# Patient Record
Sex: MALE | Race: WHITE | URBAN - METROPOLITAN AREA
[De-identification: names, ages, dates, MRNs, and addresses within clinical notes are randomized per-mention and may not be internally consistent; named-entity substitution may affect disease eponyms.]

---

## 2023-02-08 ENCOUNTER — APPOINTMENT (OUTPATIENT)
Dept: GENERAL RADIOLOGY | Age: 78
DRG: 246 | End: 2023-02-08
Attending: INTERNAL MEDICINE
Payer: MEDICARE

## 2023-02-08 ENCOUNTER — HOSPITAL ENCOUNTER (INPATIENT)
Age: 78
LOS: 13 days | Discharge: HOME | DRG: 246 | End: 2023-02-21
Attending: INTERNAL MEDICINE | Admitting: INTERNAL MEDICINE
Payer: MEDICARE

## 2023-02-08 DIAGNOSIS — N17.9 ACUTE KIDNEY INJURY SUPERIMPOSED ON CKD (HCC): Primary | ICD-10-CM

## 2023-02-08 DIAGNOSIS — N18.9 ACUTE KIDNEY INJURY SUPERIMPOSED ON CKD (HCC): Primary | ICD-10-CM

## 2023-02-08 PROBLEM — I50.21 ACUTE SYSTOLIC (CONGESTIVE) HEART FAILURE (HCC): Status: ACTIVE | Noted: 2023-02-08

## 2023-02-08 PROBLEM — R60.0 PERIPHERAL EDEMA: Status: ACTIVE | Noted: 2023-02-08

## 2023-02-08 PROBLEM — I50.23 ACUTE ON CHRONIC SYSTOLIC (CONGESTIVE) HEART FAILURE (HCC): Status: ACTIVE | Noted: 2023-02-08

## 2023-02-08 PROBLEM — E11.22 TYPE 2 DIABETES MELLITUS WITH STAGE 3 CHRONIC KIDNEY DISEASE, WITHOUT LONG-TERM CURRENT USE OF INSULIN (HCC): Status: ACTIVE | Noted: 2023-02-08

## 2023-02-08 PROBLEM — N18.30 STAGE 3 CHRONIC KIDNEY DISEASE (HCC): Status: ACTIVE | Noted: 2023-02-08

## 2023-02-08 PROBLEM — N18.30 TYPE 2 DIABETES MELLITUS WITH STAGE 3 CHRONIC KIDNEY DISEASE, WITHOUT LONG-TERM CURRENT USE OF INSULIN (HCC): Status: ACTIVE | Noted: 2023-02-08

## 2023-02-08 PROBLEM — I25.10 CORONARY ARTERY DISEASE INVOLVING NATIVE HEART WITHOUT ANGINA PECTORIS: Status: ACTIVE | Noted: 2023-02-08

## 2023-02-08 PROBLEM — R60.9 PERIPHERAL EDEMA: Status: ACTIVE | Noted: 2023-02-08

## 2023-02-08 LAB
ANION GAP SERPL CALCULATED.3IONS-SCNC: 9 MMOL/L (ref 3–16)
BUN BLDV-MCNC: 53 MG/DL (ref 7–20)
CALCIUM SERPL-MCNC: 9.1 MG/DL (ref 8.3–10.6)
CHLORIDE BLD-SCNC: 105 MMOL/L (ref 99–110)
CHOLESTEROL, TOTAL: 102 MG/DL (ref 0–199)
CO2: 25 MMOL/L (ref 21–32)
CREAT SERPL-MCNC: 1.6 MG/DL (ref 0.8–1.3)
EKG ATRIAL RATE: 78 BPM
EKG ATRIAL RATE: 81 BPM
EKG DIAGNOSIS: NORMAL
EKG DIAGNOSIS: NORMAL
EKG P AXIS: -21 DEGREES
EKG P AXIS: 0 DEGREES
EKG P-R INTERVAL: 148 MS
EKG P-R INTERVAL: 80 MS
EKG Q-T INTERVAL: 416 MS
EKG Q-T INTERVAL: 432 MS
EKG QRS DURATION: 138 MS
EKG QRS DURATION: 142 MS
EKG QTC CALCULATION (BAZETT): 483 MS
EKG QTC CALCULATION (BAZETT): 492 MS
EKG R AXIS: -48 DEGREES
EKG R AXIS: -48 DEGREES
EKG T AXIS: 100 DEGREES
EKG T AXIS: 118 DEGREES
EKG VENTRICULAR RATE: 78 BPM
EKG VENTRICULAR RATE: 81 BPM
GFR SERPL CREATININE-BSD FRML MDRD: 44 ML/MIN/{1.73_M2}
GLUCOSE BLD-MCNC: 166 MG/DL (ref 70–99)
GLUCOSE BLD-MCNC: 170 MG/DL (ref 70–99)
GLUCOSE BLD-MCNC: 176 MG/DL (ref 70–99)
GLUCOSE BLD-MCNC: 214 MG/DL (ref 70–99)
GLUCOSE BLD-MCNC: 243 MG/DL (ref 70–99)
HDLC SERPL-MCNC: 43 MG/DL (ref 40–60)
INR BLD: 1.16 (ref 0.87–1.14)
LDL CHOLESTEROL CALCULATED: 45 MG/DL
MAGNESIUM: 2.1 MG/DL (ref 1.8–2.4)
PERFORMED ON: ABNORMAL
POTASSIUM SERPL-SCNC: 4 MMOL/L (ref 3.5–5.1)
PRO-BNP: 7200 PG/ML (ref 0–449)
PROTHROMBIN TIME: 14.8 SEC (ref 11.7–14.5)
SODIUM BLD-SCNC: 139 MMOL/L (ref 136–145)
TRIGL SERPL-MCNC: 69 MG/DL (ref 0–150)
TROPONIN: 0.46 NG/ML
TSH SERPL DL<=0.05 MIU/L-ACNC: 5.9 UIU/ML (ref 0.27–4.2)
VLDLC SERPL CALC-MCNC: 14 MG/DL

## 2023-02-08 PROCEDURE — 93010 ELECTROCARDIOGRAM REPORT: CPT | Performed by: INTERNAL MEDICINE

## 2023-02-08 PROCEDURE — 2060000000 HC ICU INTERMEDIATE R&B

## 2023-02-08 PROCEDURE — 83880 ASSAY OF NATRIURETIC PEPTIDE: CPT

## 2023-02-08 PROCEDURE — 93005 ELECTROCARDIOGRAM TRACING: CPT | Performed by: INTERNAL MEDICINE

## 2023-02-08 PROCEDURE — 36415 COLL VENOUS BLD VENIPUNCTURE: CPT

## 2023-02-08 PROCEDURE — 99223 1ST HOSP IP/OBS HIGH 75: CPT | Performed by: INTERNAL MEDICINE

## 2023-02-08 PROCEDURE — 83735 ASSAY OF MAGNESIUM: CPT

## 2023-02-08 PROCEDURE — 80061 LIPID PANEL: CPT

## 2023-02-08 PROCEDURE — 97116 GAIT TRAINING THERAPY: CPT

## 2023-02-08 PROCEDURE — 6370000000 HC RX 637 (ALT 250 FOR IP): Performed by: INTERNAL MEDICINE

## 2023-02-08 PROCEDURE — 93005 ELECTROCARDIOGRAM TRACING: CPT | Performed by: HOSPITALIST

## 2023-02-08 PROCEDURE — 84484 ASSAY OF TROPONIN QUANT: CPT

## 2023-02-08 PROCEDURE — 2580000003 HC RX 258: Performed by: INTERNAL MEDICINE

## 2023-02-08 PROCEDURE — 84443 ASSAY THYROID STIM HORMONE: CPT

## 2023-02-08 PROCEDURE — 85610 PROTHROMBIN TIME: CPT

## 2023-02-08 PROCEDURE — 80048 BASIC METABOLIC PNL TOTAL CA: CPT

## 2023-02-08 PROCEDURE — 6360000002 HC RX W HCPCS: Performed by: INTERNAL MEDICINE

## 2023-02-08 PROCEDURE — 97530 THERAPEUTIC ACTIVITIES: CPT

## 2023-02-08 PROCEDURE — 97165 OT EVAL LOW COMPLEX 30 MIN: CPT

## 2023-02-08 PROCEDURE — 97161 PT EVAL LOW COMPLEX 20 MIN: CPT

## 2023-02-08 PROCEDURE — 71045 X-RAY EXAM CHEST 1 VIEW: CPT

## 2023-02-08 RX ORDER — INSULIN LISPRO 100 [IU]/ML
0-4 INJECTION, SOLUTION INTRAVENOUS; SUBCUTANEOUS NIGHTLY
Status: DISCONTINUED | OUTPATIENT
Start: 2023-02-08 | End: 2023-02-19 | Stop reason: SDUPTHER

## 2023-02-08 RX ORDER — ACETAMINOPHEN 325 MG/1
650 TABLET ORAL EVERY 6 HOURS PRN
Status: DISCONTINUED | OUTPATIENT
Start: 2023-02-08 | End: 2023-02-17 | Stop reason: ALTCHOICE

## 2023-02-08 RX ORDER — SODIUM CHLORIDE 0.9 % (FLUSH) 0.9 %
10 SYRINGE (ML) INJECTION EVERY 12 HOURS SCHEDULED
Status: DISCONTINUED | OUTPATIENT
Start: 2023-02-08 | End: 2023-02-21 | Stop reason: HOSPADM

## 2023-02-08 RX ORDER — SENNA PLUS 8.6 MG/1
1 TABLET ORAL DAILY PRN
Status: DISCONTINUED | OUTPATIENT
Start: 2023-02-08 | End: 2023-02-21 | Stop reason: HOSPADM

## 2023-02-08 RX ORDER — INSULIN GLARGINE 100 [IU]/ML
30 INJECTION, SOLUTION SUBCUTANEOUS 2 TIMES DAILY
Status: DISCONTINUED | OUTPATIENT
Start: 2023-02-08 | End: 2023-02-13

## 2023-02-08 RX ORDER — ONDANSETRON 2 MG/ML
4 INJECTION INTRAMUSCULAR; INTRAVENOUS EVERY 6 HOURS PRN
Status: DISCONTINUED | OUTPATIENT
Start: 2023-02-08 | End: 2023-02-21 | Stop reason: HOSPADM

## 2023-02-08 RX ORDER — MAGNESIUM SULFATE IN WATER 40 MG/ML
2000 INJECTION, SOLUTION INTRAVENOUS PRN
Status: DISCONTINUED | OUTPATIENT
Start: 2023-02-08 | End: 2023-02-21 | Stop reason: HOSPADM

## 2023-02-08 RX ORDER — INSULIN LISPRO 100 [IU]/ML
0-4 INJECTION, SOLUTION INTRAVENOUS; SUBCUTANEOUS
Status: DISCONTINUED | OUTPATIENT
Start: 2023-02-08 | End: 2023-02-19 | Stop reason: SDUPTHER

## 2023-02-08 RX ORDER — PIOGLITAZONEHYDROCHLORIDE 45 MG/1
45 TABLET ORAL DAILY
Status: ON HOLD | COMMUNITY
End: 2023-02-21 | Stop reason: HOSPADM

## 2023-02-08 RX ORDER — NATEGLINIDE 120 MG/1
120 TABLET ORAL
Status: ON HOLD | COMMUNITY
End: 2023-02-21 | Stop reason: HOSPADM

## 2023-02-08 RX ORDER — TAMSULOSIN HYDROCHLORIDE 0.4 MG/1
0.8 CAPSULE ORAL NIGHTLY
Status: DISCONTINUED | OUTPATIENT
Start: 2023-02-08 | End: 2023-02-21 | Stop reason: HOSPADM

## 2023-02-08 RX ORDER — ASPIRIN 325 MG
325 TABLET ORAL DAILY
Status: ON HOLD | COMMUNITY
End: 2023-02-21 | Stop reason: HOSPADM

## 2023-02-08 RX ORDER — FUROSEMIDE 40 MG/1
40 TABLET ORAL DAILY
COMMUNITY

## 2023-02-08 RX ORDER — AMLODIPINE BESYLATE 5 MG/1
5 TABLET ORAL DAILY
Status: ON HOLD | COMMUNITY
End: 2023-02-21 | Stop reason: HOSPADM

## 2023-02-08 RX ORDER — LOSARTAN POTASSIUM AND HYDROCHLOROTHIAZIDE 12.5; 5 MG/1; MG/1
1 TABLET ORAL DAILY
Status: ON HOLD | COMMUNITY
End: 2023-02-21 | Stop reason: HOSPADM

## 2023-02-08 RX ORDER — SODIUM CHLORIDE 9 MG/ML
INJECTION, SOLUTION INTRAVENOUS PRN
Status: DISCONTINUED | OUTPATIENT
Start: 2023-02-08 | End: 2023-02-21 | Stop reason: HOSPADM

## 2023-02-08 RX ORDER — ENOXAPARIN SODIUM 100 MG/ML
30 INJECTION SUBCUTANEOUS 2 TIMES DAILY
Status: DISCONTINUED | OUTPATIENT
Start: 2023-02-08 | End: 2023-02-21 | Stop reason: HOSPADM

## 2023-02-08 RX ORDER — ONDANSETRON 4 MG/1
4 TABLET, ORALLY DISINTEGRATING ORAL EVERY 8 HOURS PRN
Status: DISCONTINUED | OUTPATIENT
Start: 2023-02-08 | End: 2023-02-21 | Stop reason: HOSPADM

## 2023-02-08 RX ORDER — ASPIRIN 325 MG
325 TABLET ORAL DAILY
Status: DISCONTINUED | OUTPATIENT
Start: 2023-02-08 | End: 2023-02-17

## 2023-02-08 RX ORDER — POTASSIUM CHLORIDE 7.45 MG/ML
10 INJECTION INTRAVENOUS PRN
Status: DISCONTINUED | OUTPATIENT
Start: 2023-02-08 | End: 2023-02-21 | Stop reason: HOSPADM

## 2023-02-08 RX ORDER — SODIUM CHLORIDE 0.9 % (FLUSH) 0.9 %
10 SYRINGE (ML) INJECTION PRN
Status: DISCONTINUED | OUTPATIENT
Start: 2023-02-08 | End: 2023-02-21 | Stop reason: HOSPADM

## 2023-02-08 RX ORDER — POTASSIUM CHLORIDE 20 MEQ/1
40 TABLET, EXTENDED RELEASE ORAL PRN
Status: DISCONTINUED | OUTPATIENT
Start: 2023-02-08 | End: 2023-02-21 | Stop reason: HOSPADM

## 2023-02-08 RX ORDER — DEXTROSE MONOHYDRATE 100 MG/ML
INJECTION, SOLUTION INTRAVENOUS CONTINUOUS PRN
Status: DISCONTINUED | OUTPATIENT
Start: 2023-02-08 | End: 2023-02-21 | Stop reason: HOSPADM

## 2023-02-08 RX ORDER — TAMSULOSIN HYDROCHLORIDE 0.4 MG/1
0.4 CAPSULE ORAL DAILY
Status: ON HOLD | COMMUNITY
End: 2023-02-21 | Stop reason: HOSPADM

## 2023-02-08 RX ORDER — CARVEDILOL 3.12 MG/1
3.12 TABLET ORAL 2 TIMES DAILY WITH MEALS
Status: DISCONTINUED | OUTPATIENT
Start: 2023-02-09 | End: 2023-02-21 | Stop reason: HOSPADM

## 2023-02-08 RX ORDER — AMLODIPINE BESYLATE 5 MG/1
5 TABLET ORAL DAILY
Status: DISCONTINUED | OUTPATIENT
Start: 2023-02-08 | End: 2023-02-10

## 2023-02-08 RX ORDER — ACETAMINOPHEN 650 MG/1
650 SUPPOSITORY RECTAL EVERY 6 HOURS PRN
Status: DISCONTINUED | OUTPATIENT
Start: 2023-02-08 | End: 2023-02-21 | Stop reason: HOSPADM

## 2023-02-08 RX ORDER — ATORVASTATIN CALCIUM 40 MG/1
40 TABLET, FILM COATED ORAL DAILY
Status: DISCONTINUED | OUTPATIENT
Start: 2023-02-08 | End: 2023-02-21 | Stop reason: HOSPADM

## 2023-02-08 RX ORDER — ATORVASTATIN CALCIUM 40 MG/1
40 TABLET, FILM COATED ORAL DAILY
COMMUNITY

## 2023-02-08 RX ORDER — FUROSEMIDE 10 MG/ML
40 INJECTION INTRAMUSCULAR; INTRAVENOUS 2 TIMES DAILY
Status: DISCONTINUED | OUTPATIENT
Start: 2023-02-08 | End: 2023-02-12

## 2023-02-08 RX ADMIN — TAMSULOSIN HYDROCHLORIDE 0.8 MG: 0.4 CAPSULE ORAL at 21:19

## 2023-02-08 RX ADMIN — ASPIRIN 325 MG: 325 TABLET ORAL at 09:33

## 2023-02-08 RX ADMIN — ACETAMINOPHEN 650 MG: 325 TABLET ORAL at 16:26

## 2023-02-08 RX ADMIN — ENOXAPARIN SODIUM 30 MG: 100 INJECTION SUBCUTANEOUS at 09:34

## 2023-02-08 RX ADMIN — INSULIN LISPRO 1 UNITS: 100 INJECTION, SOLUTION INTRAVENOUS; SUBCUTANEOUS at 17:34

## 2023-02-08 RX ADMIN — AMLODIPINE BESYLATE 5 MG: 5 TABLET ORAL at 09:33

## 2023-02-08 RX ADMIN — INSULIN GLARGINE 30 UNITS: 100 INJECTION, SOLUTION SUBCUTANEOUS at 21:16

## 2023-02-08 RX ADMIN — ENOXAPARIN SODIUM 30 MG: 100 INJECTION SUBCUTANEOUS at 21:19

## 2023-02-08 RX ADMIN — FUROSEMIDE 40 MG: 10 INJECTION, SOLUTION INTRAMUSCULAR; INTRAVENOUS at 17:34

## 2023-02-08 RX ADMIN — ATORVASTATIN CALCIUM 40 MG: 40 TABLET, FILM COATED ORAL at 09:33

## 2023-02-08 RX ADMIN — FUROSEMIDE 40 MG: 10 INJECTION, SOLUTION INTRAMUSCULAR; INTRAVENOUS at 09:33

## 2023-02-08 RX ADMIN — Medication 10 ML: at 09:34

## 2023-02-08 RX ADMIN — INSULIN GLARGINE 30 UNITS: 100 INJECTION, SOLUTION SUBCUTANEOUS at 09:34

## 2023-02-08 ASSESSMENT — PAIN SCALES - GENERAL
PAINLEVEL_OUTOF10: 3
PAINLEVEL_OUTOF10: 3
PAINLEVEL_OUTOF10: 0

## 2023-02-08 ASSESSMENT — PAIN DESCRIPTION - LOCATION
LOCATION: FOOT
LOCATION: FOOT

## 2023-02-08 ASSESSMENT — PAIN DESCRIPTION - ORIENTATION
ORIENTATION: RIGHT
ORIENTATION: RIGHT

## 2023-02-08 ASSESSMENT — PAIN DESCRIPTION - DESCRIPTORS
DESCRIPTORS: ACHING
DESCRIPTORS: ACHING

## 2023-02-08 NOTE — DISCHARGE INSTRUCTIONS
Labs in 1 week.       For nutrition questions after discharge please call the Registered Dietitian at [phone].      Heart Failure Nutrition Therapy  This diet will help you feel better and support your heart by reducing symptoms of fluid retention, shortness of breath and swelling.   You should focus on:  Limiting sodium in your diet by reading labels and limiting foods high in sodium.  Limit your daily sodium intake to 2,000 mg per day.  Select foods with 140 mg of sodium or less per serving.  Foods with more than 300 mg of sodium per serving may not fit into a reduced-sodium meal plan.  Check serving sizes. If you eat more than 1 serving, you will get more sodium than the amount listed.   Limiting fluid in your diet.  Ask your doctor how much fluid you can have per day  Remember foods that are liquid at room temperature such as popsicles, soup, ice cream and Jell-O are fluids.   Checking your weight to make sure you're not retaining too much fluid.  Weigh yourself every morning. If you gain 3 or more pounds in one day or 5 pounds within 1 week, call your doctor.  Foods to choose and avoid:  Avoid processed foods. Eat more fresh foods.  Fresh and frozen fruits and vegetables are good choices.  Choose fresh meats. Avoid processed meats such as gabriel, sausage and hot dogs.  Do not add salt to your food while cooking or at the table.  Try dry or fresh herbs, pepper, lemon juice, or a sodium-free seasoning blend such as Mrs. Dash to add flavor to food.   Do not use a salt substitute.  Use caution at restaurants  Restaurant foods are high in sodium. Ask for your food to be cooked without salt and request sauces and dressing to come “on the side.”                The [place] Sleepiness Scale       The [place] Sleepiness Scale is widely used in the field of sleep medicine as a subjective measure of a patient's sleepiness. The test is a list of eight situations in which you rate your tendency to become sleepy on a  scale of 0, no chance to 3, high chance of dozing. Your score is based on a scale of 0 to 24. The scale estimates whether you are experiencing excessive sleepiness that possibly requires medical attention. How Sleepy Are You? How sleepy are you to doze off or fall asleep in the following situations? You should rate your chances of dozing off, not just feeling tired. Even if you have not done some of these things recently try to determine how they would have affected you. For each situation, decide whether or not you would have:     0 = No chance of dozing 1 = Slight chance of dozing   2 = Moderate chance of  dozing 3 = High change of dozing       Situation                                                                                     Chance of Dozing    Sitting and reading  0 =  []  1 =    [] 2 =    [] 3 =    [x]    Watching TV  0 =  []  1 =    [] 2 =    [x] 3 =    []      Sitting inactive in public place (e.g., a theater or a meeting)  0 =  [x]  1 =    [] 2 =    [] 3 =    []    As a passenger in a car for an hour without a break          0  =  [x]  1 =    [] 2 =    [] 3 =    []    Lying down to rest in the afternoon when circumstances permit    0 =  []  1 =    [] 2 =    [x] 3 =    []    Sitting and talking to someone  0 =  [x]  1 =    [] 2 =    [] 3 =    []      Sitting quietly after a lunch without alcohol  0 =  []  1 =    [] 2 =    [] 3 =    [x]    In a car, while stopped for a few minutes in traffic                                                                      0 =  [x]  1 =    [] 2 =    [] 3 =    []    Total Score = 10    If your total score is 10 or greater, you are experiencing excessive sleepiness and should consider seeking a medical follow-up. Take a copy of this screening test to your primary care physician on your next office visit. Interpretation:      0 -   7: It is unlikely that you are abnormally sleepy. 8 -   9: You have an average amount of daytime sleepiness. 10 - 15:  You may be excessively sleepy depending on the situation. You may want to consider seeking medical attention.   16 - 24: You are excessively sleepy and should consider seeking medical attention.      Electronically signed by Mark Bennett RCP on 2/9/2023 at 6:31 PM    Post Angiogram/ Intervention Discharge Instructions      Do you have the help you need at home?        Activity:  You may drive in 24hrs unless instructed by your doctor   Resume normal activity in one week  Avoid lifting, pushing, or pulling more than 10 lbs. For one week. (A gallon of milk is 7 lbs)  Limit stair climbing to once a day for two weeks.  You may walk at an easy pace on ground level.  Plan rest periods during the day.  Avoid tension and stress.  Learn to manage your stress.  Avoid work that increases muscle tension.        (straining with bowel movements, moving furniture)    Wound Care:  You may shower, but no bathtubs, pools, or hot tubs for one week.  Inspect area daily.  Normal observations:  Soreness and tenderness that may last for one week, mild pink to red oozing from incision site for up to 24 hrs after discharge,    bruising that could last up to two weeks.  Clean with soap and water.  NO lotion or powder.  Dry area thoroughly.  Apply band    aide for 48 hrs.    Nutrition:   Low fat, low salt diet (guidelines for Heart Healthy eating)  Limit caffeine to 1-2 cups per day (coffee, tea, chocolate, soda)  Eat high fiber to avoid constipation and straining during bowel movements (fresh veggies and fruit, whole grain)  Limit alcohol to two servings a day ( 8 oz beer, 1 oz liquor, 4 oz wine )  Drink at least 8 to 10 glasses of decaffeinated, non-alcoholic fluid for the next 24 hours to flush the x-ray dye used for your angiogram out of your body.     Depression:  It is not unusual to have feelings of anxiety, fear, or depression after your procedure.  If you need help with these feelings, call your primary care physician.  There are  medications to help you and healing usually occurs sooner if you are not depressed. Cardiac Rehab Information Given : [phone]  Your physician has referred you to Cardiac Rehab. This is an important part of your recovery. You have been given a brief explanation of Cardiac Rehab and instructions when to call Cardiac Rehab. The Cardiac Rehab team works with your cardiologist to start your Rehab at the appropriate time in your recovery. Remember to discuss Cardiac Rehab with your physician at your first follow-up visit. What symptoms or health problems do you need to look out for after you leave the hospital? Call your physician if you have any of these symptoms.      Increased shortness of breath, weakness, or increased fatigue  A fast or a slow heartbeat  Problems or questions with your medications  Bleeding that is not stopped after holding pressure for 10 min  Feeling lightheaded or dizzy  Increased swelling or bruising of the groin or leg  Unusual pain, numbness or tingling at the groin or down the leg  Any signs of infection ( redness, yellow drainage, swelling, pain, fever)  Unusual swelling of lower legs/feet, chest discomfort, unusual weakness or fatigue

## 2023-02-08 NOTE — PROGRESS NOTES
[facility]   Phone: [phone]    Occupational Therapy    [x] Initial Evaluation            [] Daily Treatment Note         [x] Discharge Summary      Patient: Monica Ortiz   : 1945   MRN: [id number]   Date of Service:  2023    Admitting Diagnosis:  Acute on chronic systolic (congestive) heart failure [facility])  Current Admission Summary: 69 y.o. male who presented with confusion leg swelling shortness of breath and hypoxia patient is a transfer from outside ER. History of CABG CAD currently not seeing any cardiologist has not seen a doctor in a while who presents to ER with worsening shortness of breath and leg swelling orthopnea no fevers no chills nothing that makes it better or worse. History of diabetes hypertension   Past Medical History:  has no past medical history on file. Past Surgical History:  has no past surgical history on file. Discharge Recommendations: Monica Ortiz scored a 23/24 on the AM-PAC ADL Inpatient form. At this time, no further OT is recommended upon discharge due to pt at baseline. Recommend patient returns to prior setting with prior services. DME Required For Discharge: patient has all required DME for discharge    Precautions/Restrictions: medium fall risk  Weight Bearing Restrictions: no restrictions  [] Right Upper Extremity  [] Left Upper Extremity [] Right Lower Extremity  [] Left Lower Extremity     Required Braces/Orthotics: no braces required   [] Right  [] Left  Positional Restrictions:no positional restrictions    Pre-Admission Information   Lives With: spouse    Type of Home: mobile home  Home Layout:  1 step down to stove room?   Home Access: ramped entry, 2 step to enter without rails   Bathroom Layout: tub/shower unit  Bathroom Equipment: shower chair, hand held shower head  Toilet Height: standard height  Home Equipment: rolling walker, single point cane, quad cane  Transfer Assistance: Independent without use of device  Ambulation Assistance:Independent without use of device, ambulates short distances d/t \"bad left hip\"  ADL Assistance: independent with all ADL's  IADL Assistance:  wife completes IADLs  Active :        [x] Yes  [] No  Hand Dominance: [] Left  [x] Right  Current Employment: retired. Occupation: driving tractor trailer  Hobbies: watching [employer]: denies recent falls     Examination   Vision:   Vision Gross Assessment: WFL  Hearing:   hard of hearing  Posture:   good  Sensation:   reports numbness and tingling in (B) LE- neuropathy  Tone:   Normotonic  Coordination Testing:   WFL    ROM:   (B) UE AROM WFL  Strength:   (B) UE strength grossly WFL    Therapist Clinical Decision Making (Complexity): low complexity  Clinical Presentation: stable      Subjective  General: patient supine in bed on arrival, agreeable to OT/PT evaluation. Pt reports getting up to bathroom on own. Pain: 4/10. Location: from mccoy catheter  Pain Interventions: pain medication in place prior to arrival        Activities of Daily Living  Basic Activities of Daily Living  Lower Extremity Dressing: minimal assistance Comment: A to thread LLE d/t mccoy catheter; pt reports he has a whole set up at home to don pants/underwear  Instrumental Activities of Daily Living  No IADL completed on this date. Functional Mobility  Bed Mobility  Supine to Sit: modified independent  Sit to Supine: modified independent  Scooting: modified independent  Comments: Hob elevated  Transfers  Sit to stand transfer:Independent  Stand to sit transfer: Independent  Comments: EOB to no device  Functional Mobility:  Standing Balance: Independent. Functional Mobility: .   Independent  Functional Mobility Activity: 50  Functional Mobility Device Use: no device  Comment: limping on LLE d/t pain, reports this is baseline, pt only able to ambulate short distances d/t L hip pain    Other Therapeutic Interventions    Functional Outcomes  AM-PAC Inpatient Daily Activity Raw Score: 23    Cognition  WFL  Orientation:    alert and oriented x 4  Command Following:   [facility]     Education  Barriers To Learning: none  Patient Education: patient educated on OT role and benefits, plan of care, ADL adaptive strategies, discharge recommendations  Learning Assessment:  patient verbalizes and demonstrates understanding    Assessment  Activity Tolerance: tolerates well  Impairments Requiring Therapeutic Intervention: none - eval with same day discharge  Prognosis: good  Clinical Assessment: patient presents at baseline, no further OT needs at this time. Safety Interventions: patient left in bed, call light within reach, and nurse notified    Plan  Frequency: Eval with same day discharge. No follow up required. Current Treatment Recommendations: not applicable, evaluation completed with same day discharge. Goals  Patient Goals:    Short Term Goals:  Time Frame:   Patient eval with same day discharge. No goals set as patient is at baseline status.     Therapy Session Time     Individual Group Co-treatment   Time In    1350   Time Out    1413   Minutes    23        Timed Code Treatment Minutes:   8  Total Treatment Minutes:  23       Electronically Signed By: MYRA Rosado/CORTEZ [id number]

## 2023-02-08 NOTE — PLAN OF CARE
Pt updated on current plan of care, see flowsheet for assessment.    Problem: Discharge Planning  Goal: Discharge to home or other facility with appropriate resources  2/8/2023 1438 by Darien Cobb RN  Outcome: Progressing     Problem: Safety - Adult  Goal: Free from fall injury  2/8/2023 1438 by Darien Cobb RN  Outcome: Progressing     Problem: ABCDS Injury Assessment  Goal: Absence of physical injury  2/8/2023 1438 by Darien Cobb RN  Outcome: Progressing     Problem: Respiratory - Adult  Goal: Achieves optimal ventilation and oxygenation  Outcome: Progressing     Problem: Cardiovascular - Adult  Goal: Maintains optimal cardiac output and hemodynamic stability  Outcome: Progressing     Problem: Skin/Tissue Integrity - Adult  Goal: Skin integrity remains intact  Outcome: Progressing     Problem: Gastrointestinal - Adult  Goal: Maintains or returns to baseline bowel function  Outcome: Progressing     Problem: Genitourinary - Adult  Goal: Absence of urinary retention  Outcome: Progressing     Problem: Metabolic/Fluid and Electrolytes - Adult  Goal: Electrolytes maintained within normal limits  Outcome: Progressing

## 2023-02-08 NOTE — PROGRESS NOTES
Pt has no orders to continue the Heparin he arrived. Dr. Anna Peña about both patient's arrival, being on Heparin GTT and a request was made for doctor to see STAT EKG that was ordered.

## 2023-02-08 NOTE — H&P
HOSPITALISTS HISTORY AND PHYSICAL    2/8/2023 10:47 AM    Patient Information:  Guinevere Libman is a 69 y.o. male [id number]  PCP:  No primary care provider on file. (Tel: None )    Chief complaint:  No chief complaint on file. Shortness of    History of Present Illness:  Robb Wilson is a 69 y.o. male who presented with confusion leg swelling shortness of breath and hypoxia patient is a transfer from outside ER. History of CABG CAD currently not seeing any cardiologist has not seen a doctor in a while who presents to ER with worsening shortness of breath and leg swelling orthopnea no fevers no chills nothing that makes it better or worse. History of diabetes hypertension   REVIEW OF SYSTEMS:   Constitutional: Negative for fever,chills or night sweats  ENT: Negative for rhinorrhea, epistaxis, hoarseness, sore throat. Respiratory: Negative for shortness of breath,wheezing  Cardiovascular: Negative for chest pain, palpitations   Gastrointestinal: Negative for nausea, vomiting, diarrhea  Genitourinary: Negative for polyuria, dysuria   Hematologic/Lymphatic: Negative for bleeding tendency, easy bruising  Musculoskeletal: Negative for myalgias and arthralgias  Neurologic: Negative for confusion,dysarthria. Skin: Negative for itching,rash, good capillary refill. Psychiatric: Negative for depression,anxiety, agitation. Endocrine: Negative for polydipsia,polyuria,heat /cold intolerance. Past Medical History:   has no past medical history on file. Past Surgical History:   has no past surgical history on file. Medications:  No current facility-administered medications on file prior to encounter.      Current Outpatient Medications on File Prior to Encounter   Medication Sig Dispense Refill    atorvastatin (LIPITOR) 40 MG tablet Take 40 mg by mouth daily Take at bedtime      Insulin Detemir (LEVEMIR FLEXTOUCH SC) Inject 30 Units into the skin 2 times daily tamsulosin (FLOMAX) 0.4 MG capsule Take 0.4 mg by mouth daily Take at bedtime      nateglinide (STARLIX) 120 MG tablet Take 120 mg by mouth 3 times daily (before meals)      losartan-hydroCHLOROthiazide (HYZAAR) 50-12.5 MG per tablet Take 1 tablet by mouth daily      pioglitazone (ACTOS) 45 MG tablet Take 45 mg by mouth daily Each morning      amLODIPine (NORVASC) 5 MG tablet Take 5 mg by mouth daily Each evening      furosemide (LASIX) 40 MG tablet Take 40 mg by mouth daily Each morning      aspirin 325 MG tablet Take 325 mg by mouth daily Each evening         Allergies: Allergies   Allergen Reactions    Latex         Social History:        Family History:  family history is not on file. ,     Physical Exam:  /68   Pulse 73   Temp 98 °F (36.7 °C) (Oral)   Resp 19   Ht 6' 1\" (1.854 m)   Wt (!) 308 lb (139.7 kg)   SpO2 99%   BMI 40.64 kg/m²     General appearance:  Appears comfortable. Well nourished  Eyes: Sclera clear, pupils equal  ENT: Moist mucus membranes, no thrush. Trachea midline. Cardiovascular: Regular rhythm, normal S1, S2. No murmur, gallop, rub. No edema in lower extremities  Respiratory: Clear to auscultation bilaterally, no wheeze, good inspiratory effort  Gastrointestinal: Abdomen soft, non-tender, not distended, normal bowel sounds  Musculoskeletal: No cyanosis in digits, neck supple  Neurology: Cranial nerves grossly intact. Alert and oriented in time, place and person. No speech or motor deficits  Psychiatry: Appropriate affect.  Not agitated  Skin: Warm, dry, normal turgor, no rash    Labs:  CBC: No results found for: WBC, RBC, HGB, HCT, MCV, MCH, MCHC, RDW, PLT, MPV  BMP:    Lab Results   Component Value Date/Time     02/08/2023 06:03 AM    K 4.0 02/08/2023 06:03 AM     02/08/2023 06:03 AM    CO2 25 02/08/2023 06:03 AM    BUN 53 02/08/2023 06:03 AM    CREATININE 1.6 02/08/2023 06:03 AM    CALCIUM 9.1 02/08/2023 06:03 AM    LABGLOM 44 02/08/2023 06:03 AM    GLUCOSE 170 02/08/2023 06:03 AM       Chest Xray:   EKG:    I visualized CXR images and EKG strips     Discussed  with      Problem List  Principal Problem:    Acute on chronic systolic (congestive) heart failure (HCC)  Resolved Problems:    * No resolved hospital problems. *        Assessment/Plan:   Acute CHF exacerbation  -Patient with history of CABG CAD. No recent echo diagnosing cardiology  -We will start with echocardiogram IV Lasix monitor close  -Cardiology consult.   -Monitor renal function closely    Chronic kidney disease  -Was 1.6 no baseline to compare so we will monitor  renal function      Diabetes  -A1c goal for medication sliding scale.  -Lantus  BPH continue Flomax    CAD on aspirin statin we will continue      Leslee Sampsno MD    2/8/2023 10:47 AM

## 2023-02-08 NOTE — CONSULTS
[facility]  Advanced CHF/Pulmonary Hypertension   Cardiac Evaluation      Florence Needles  YOB: 1945    Requesting PHysician:  Dr. Monique Martines    Chief complaint:  shortness of breath  History of Present Illness:  Radha Grimes is a 70 yo male who presented to [facility] ER with confusion, leg swelling, shortness of breath, and hypoxia. He was transferred here for the same. He has a history of CAD/CABG. His CABG was about 20 years ago at [facility]. He followed years ago with Dr. Pelon Russell from [facility]. When Jillian left, he just kept following with his PCP. No cardiac testing in the last 10+ years. He says that he has had worsening shortness of breath and leg swelling plus orthopnea for over a month. He went to his pcp office a few days before admission for severe peripheral edema. He was started on lasix, but did not have significant improvement. He denies chest pain. He has chronic kidney disease stage 3 and sees a nephrologist.  He has long standing diabetes. He has been on Actos for a long time. No recent med changes other than starting lasix. He denies fever, chills. Nothing makes the shortness of breath better or worse. We are consulted for management of his HF. Labs:  Sodium 139  K 4.0  BUN/Cre 53/1.6  Glucose 170  Bnp 7200  Troponin 0.46    CXR:  Impression   Mild cardiomegaly with findings of pulmonary edema.      I/O's negative 1600 cc  Lipitor 40  Insulin  Flomax  Losartan/hctz 50/12.5 qd  Actos 45 qd  Amlodipine 5 qd  Lasix 40 qd  Aspirin 325 qd        Allergies   Allergen Reactions    Latex      Current Facility-Administered Medications   Medication Dose Route Frequency Provider Last Rate Last Admin    sodium chloride flush 0.9 % injection 10 mL  10 mL IntraVENous 2 times per day Kevin Deng MD   10 mL at 02/08/23 0934    sodium chloride flush 0.9 % injection 10 mL  10 mL IntraVENous PRN Kevin Deng MD        0.9 % sodium chloride infusion   IntraVENous PRN Marcella Melendez Abida Bhandari MD        ondansetron (ZOFRAN-ODT) disintegrating tablet 4 mg  4 mg Oral Q8H PRN Sally Rios MD        Or    [facility]ans[facility]ron [facility] injection 4 mg  4 mg IntraVENous Q6H PRN Sally Rios MD        [facility]) tablet 8.6 mg  1 tablet Oral Daily PRN Sally Rios MD        acetaminophen (TYLENOL) tablet 650 mg  650 mg Oral Q6H PRN Sally Rios MD        Or    acetaminophen (TYLENOL) suppository 650 mg  650 mg Rectal Q6H PRN Sally Rios MD        enoxaparin Sodium (LOVENOX) injection 30 mg  30 mg SubCUTAneous BID Sally Rios MD   30 mg at 02/08/23 0934    potassium chloride (KLOR-CON M) extended release tablet 40 mEq  40 mEq Oral PRN Sally Rios MD        Or    potassium bicarb-citric acid (EFFER-K) effervescent tablet 40 mEq  40 mEq Oral PRN Sally Rios MD        Or    potassium chloride 10 mEq/100 mL IVPB (Peripheral Line)  10 mEq IntraVENous PRN Sally Rios MD        magnesium sulfate 2000 mg in 50 mL IVPB premix  2,000 mg IntraVENous PRN Sally Rios MD        furosemide (LASIX) injection 40 mg  40 mg IntraVENous BID Sally Rios MD   40 mg at 02/08/23 0933    amLODIPine (NORVASC) tablet 5 mg  5 mg Oral Daily Sally Rios MD   5 mg at 02/08/23 8151    aspirin tablet 325 mg  325 mg Oral Daily Sally Rios MD   325 mg at 02/08/23 0933    atorvastatin (LIPITOR) tablet 40 mg  40 mg Oral Daily Sally Rios MD   40 mg at 02/08/23 0933    tamsulosin (FLOMAX) capsule 0.8 mg  0.8 mg Oral Nightly aSlly Rios MD        insulin lispro (HUMALOG) injection vial 0-4 Units  0-4 Units SubCUTAneous TID [facility] Sally Rios MD        insulin lispro (HUMALOG) injection vial 0-4 Units  0-4 Units SubCUTAneous Nightly Sally Rios MD        dextrose bolus 10% 125 mL  125 mL IntraVENous PRN Sally iRos MD        Or    dextrose bolus 10% 250 mL  250 mL IntraVENous PRN Sally Rios MD        glucagon (rDNA) injection 1 mg  1 mg SubCUTAneous PRN Sally Rios MD        dextrose 10 % infusion IntraVENous Continuous PRN Jose Rico MD        insulin glargine (LANTUS) injection vial 30 Units  30 Units SubCUTAneous BID Jose Rico MD   30 Units at 02/08/23 0000    perflutren lipid microspheres (DEFINITY) injection 1.5 mL  1.5 mL IntraVENous ONCE PRN Lisa Guerrero MD           No past medical history on file. No past surgical history on file. No family history on file. Social History     Socioeconomic History    Marital status:      Spouse name: Not on file    Number of children: Not on file    Years of education: Not on file    Highest education level: Not on file   Occupational History    Not on file   Tobacco Use    Smoking status: Not on file    Smokeless tobacco: Not on file   Substance and Sexual Activity    Alcohol use: Not on file    Drug use: Not on file    Sexual activity: Not on file   Other Topics Concern    Not on file   Social History Narrative    Not on file     Social Determinants of Health     Financial Resource Strain: Not on file   Food Insecurity: Not on file   Transportation Needs: Not on file   Physical Activity: Not on file   Stress: Not on file   Social Connections: Not on file   Intimate Partner Violence: Not on file   Housing Stability: Not on file       Review of Systems:   Constitutional: there has been no unanticipated weight loss. There's been no change in energy level, sleep pattern, or activity level. Eyes: No visual changes or diplopia. No scleral icterus. ENT: No Headaches, hearing loss or vertigo. No mouth sores or sore throat. Cardiovascular: Reviewed in HPI  Respiratory: No cough or wheezing, no sputum production. No hematemesis. Gastrointestinal: No abdominal pain, appetite loss, blood in stools. No change in bowel or bladder habits. Genitourinary: No dysuria, trouble voiding, or hematuria. Musculoskeletal:  No gait disturbance, weakness or joint complaints. Integumentary: No rash or pruritis.   Neurological: No headache, diplopia, change in muscle strength, numbness or tingling. No change in gait, balance, coordination, mood, affect, memory, mentation, behavior. Psychiatric: No anxiety, no depression. Endocrine: No malaise, fatigue or temperature intolerance. No excessive thirst, fluid intake, or urination. No tremor. Hematologic/Lymphatic: No abnormal bruising or bleeding, blood clots or swollen lymph nodes. Allergic/Immunologic: No nasal congestion or hives. Physical Examination:    Vitals:    02/08/23 0722 02/08/23 0741 02/08/23 0900 02/08/23 1041   BP: 118/73  (!) 132/58 124/68   Pulse: 91   73   Resp: 18   19   Temp: 98.5 °F (36.9 °C)   98 °F (36.7 °C)   TempSrc: Oral   Oral   SpO2: 97%   99%   Weight:  (!) 308 lb (139.7 kg)     Height:         Body mass index is 40.64 kg/m². Wt Readings from Last 3 Encounters:   02/08/23 (!) 308 lb (139.7 kg)     BP Readings from Last 3 Encounters:   02/08/23 124/68     Constitutional and General Appearance:   Chronically ill appearing, with swelling. HEENT:  NC/AT  EDISON  No problems with hearing  Skin:  Warm, dry  Respiratory:  Normal excursion and expansion without use of accessory muscles  Resp Auscultation: Normal breath sounds without dullness  Cardiovascular: The apical impulses not displaced  Heart tones are crisp and normal  Cervical veins are not engorged  The carotid upstroke is normal in amplitude and contour without delay or bruit  JVP less than 8 cm H2O  RRR with nl S1 and S2 without m,r,g  Peripheral pulses are symmetrical and full  There is no clubbing, cyanosis of the extremities.   3+ bilateral edema up to knees with skin changes consistent with chronic edema  Femoral Arteries: 2+ and equal  Pedal Pulses: 2+ and equal   Neck:  No thyromegaly  Abdomen:  No masses or tenderness  Liver/Spleen: No Abnormalities Noted  Neurological/Psychiatric:  Alert and oriented in all spheres  Moves all extremities well  Exhibits normal gait balance and coordination  No abnormalities of mood, affect, memory, mentation, or behavior are noted    Labs were reviewed including labs from other hospital systems through [facility]. Cardiac testing was reviewed including echos, nuclear scans, cardiac catheterization, including from other hospital systems through [facility]. Assessment:     Acute heart failure, likely systolic  CAD, s/p CABG about 20 years ago  Chronic kidney disease  Diabetes, multiple drugs  Hyperlipidemia  Peripheral edema    Plan:   Get an echo to evaluate LVEF  He likely has worsening CAD leading to systolic HF  Continue IV Lasix for several more days  Stop Actos since it can make peripheral edema much worse  Consider adding Jardiance for diabetes and HF  Hold ACEI/ARB/ARNI for now until better diuresed  Will not start spironolactone due to kidney failure. He likely can only tolerate ACEI/ARB/ARNI with his kidney failure  Will need ischemic evaluation if LVEF has dropped  Start coreg 3.125 bid  If LVEF is low, I would stop amlodipine and give him ACE  I/ARB/ARNI instead  CHF education reinforced. ~salt restriction  ~fluid restriction  ~medication compliance  ~daily weights and notify of any significant weight gain/loss  ~establish with CHF nurse  ~outpatient follow-up with our CHF team    The patient was seen for > 75 minutes. I reviewed interval history, physical exam, review of data including labs, imaging, development and implementation of treatment plan and coordination of complex care. More than 50% of the time was devoted to counseling the patient on their diagnoses/treatments, as well as coordination of care with the other care teams      I appreciate the opportunity of cooperating in the care of this patient.     Seth Buckley M.D., [facility]

## 2023-02-08 NOTE — PROGRESS NOTES
[facility] Department   Phone: [phone]    Physical Therapy    [x] Initial Evaluation            [] Daily Treatment Note         [x] Discharge Summary      Patient: Robb Wilson   : 1945   MRN: [id number]   Date of Service:  2023  Admitting Diagnosis: Acute on chronic systolic (congestive) heart failure [facility])  Current Admission Summary:  Robb Wilson is a 69 y.o. male who presented with confusion leg swelling shortness of breath and hypoxia patient is a transfer from outside ER. History of CABG CAD currently not seeing any cardiologist has not seen a doctor in a while who presents to ER with worsening shortness of breath and leg swelling orthopnea no fevers no chills nothing that makes it better or worse. History of diabetes hypertension   Past Medical History:  has no past medical history on file. Past Surgical History:  has no past surgical history on file. Discharge Recommendations: Robb Wilson scored a 24/24 on the AM-PAC short mobility form. At this time, no further PT is recommended upon discharge due to presentation at baseline function. Recommend patient returns to prior setting with prior services. DME Required For Discharge: None  Precautions/Restrictions: medium fall risk  Weight Bearing Restrictions: no restrictions     Required Braces/Orthotics: no braces required  Positional Restrictions:no positional restrictions    Lives With: spouse                  Type of Home: mobile home  Home Layout:  1 step down to stove room?   Home Access: ramped entry, 2 step to enter without rails   Bathroom Layout: tub/shower unit  Bathroom Equipment: shower chair, hand held shower head  Toilet Height: standard height  Home Equipment: rolling walker, single point cane, quad cane  Transfer Assistance: Independent without use of device  Ambulation Assistance:Independent without use of device, ambulates short distances only d/t \"bad left hip\"  ADL Assistance: independent with all ADL's  IADL Assistance:  wife completes IADLs  Active :        [x] Yes                 [] No  Hand Dominance: [] Left                 [x] Right  Current Employment: retired. Occupation: driving tractor trailer  Hobbies: watching [email or web address] Kraig: denies recent falls     Examination   Vision:   Vision Gross Assessment: WFL  Hearing:   WFL  Observation:   Redness in BLEs   Sensation:   N/T in B feet - peripheral neuropathy  ROM:   BLE WFL  Strength:   BLE WFL  Decision Making: low complexity  Clinical Presentation: stable      Subjective  General: supine in bed upon arrival. Agreeable to therapy. Pain: 4/10. Location: discomfort from mccoy  Pain Interventions: RN notified       Functional Mobility  Bed Mobility  Supine to sit: Independent  Sit to supine: Independent  Comments:  Transfers  Sit to stand: Independent  Stand to sit:  Independent  Comments:  Ambulation  Surface:level surface  Assistive Device: no device  Assistance: Independent  Distance: 50'  Gait Mechanics: increased medial lateral sway (reports leg length discrepancy following hip surgery), steady   Comments:    Stair Mobility  Not performed this date    Balance  Static Sitting Balance: good: independent with functional balance in unsupported position  Dynamic Sitting Balance: good: independent with functional balance in unsupported position  Static Standing Balance: good: independent with functional balance in unsupported position  Dynamic Standing Balance: good: independent with functional balance in unsupported position  Comments:    Other Therapeutic Interventions    Functional Outcomes  AM-PAC Inpatient Mobility Raw Score : 24              Cognition  WFL  Orientation:    alert and oriented x 4  Command Following:   [facility]    Education  Barriers To Learning: none  Patient Education: patient educated on goals, PT role and benefits, discharge recommendations  Learning Assessment:  patient verbalizes and demonstrates understanding    Assessment  Activity Tolerance: limited by chronic pain  Impairments Requiring Therapeutic Intervention: none- eval with same day discharge  Clinical Assessment: Patient presents at baseline function. No further acute PT needs. Safety Interventions: patient left in bed, call light within reach, patient at risk for falls, and nurse notified    Plan  Frequency: Eval with same day discharge - No follow up required. Current Treatment Recommendations: not applicable, evaluation completed with same day discharge. Goals  Short Term Goals:    Patient eval with same day discharge. No goals set as patient is at baseline mobility status.       Therapy Session Time      Individual Group Co-treatment   Time In     3100   Time Out     1412   Minutes     23     Timed Code Treatment Minutes:  8 Minutes  Total Treatment Minutes:  23       Electronically Signed By: Moon Medellin PT      Thanks, Moon Medellin PT, DPT [id number]

## 2023-02-09 PROBLEM — I50.9 ACUTE HEART FAILURE (HCC): Status: ACTIVE | Noted: 2023-02-08

## 2023-02-09 PROBLEM — E78.5 HYPERLIPIDEMIA: Status: ACTIVE | Noted: 2023-02-09

## 2023-02-09 PROBLEM — I25.84 CORONARY ARTERY DISEASE DUE TO CALCIFIED CORONARY LESION: Status: ACTIVE | Noted: 2023-02-08

## 2023-02-09 LAB
ALBUMIN SERPL-MCNC: 3.5 G/DL (ref 3.4–5)
ALP BLD-CCNC: 96 U/L (ref 40–129)
ALT SERPL-CCNC: 16 U/L (ref 10–40)
ANION GAP SERPL CALCULATED.3IONS-SCNC: 11 MMOL/L (ref 3–16)
AST SERPL-CCNC: 18 U/L (ref 15–37)
BILIRUB SERPL-MCNC: 0.6 MG/DL (ref 0–1)
BILIRUBIN DIRECT: <0.2 MG/DL (ref 0–0.3)
BILIRUBIN, INDIRECT: NORMAL MG/DL (ref 0–1)
BUN BLDV-MCNC: 52 MG/DL (ref 7–20)
CALCIUM SERPL-MCNC: 8.9 MG/DL (ref 8.3–10.6)
CHLORIDE BLD-SCNC: 104 MMOL/L (ref 99–110)
CO2: 24 MMOL/L (ref 21–32)
CREAT SERPL-MCNC: 1.8 MG/DL (ref 0.8–1.3)
FERRITIN: 145.8 NG/ML (ref 30–400)
GFR SERPL CREATININE-BSD FRML MDRD: 38 ML/MIN/{1.73_M2}
GLUCOSE BLD-MCNC: 122 MG/DL (ref 70–99)
GLUCOSE BLD-MCNC: 128 MG/DL (ref 70–99)
GLUCOSE BLD-MCNC: 172 MG/DL (ref 70–99)
GLUCOSE BLD-MCNC: 174 MG/DL (ref 70–99)
GLUCOSE BLD-MCNC: 202 MG/DL (ref 70–99)
HCT VFR BLD CALC: 35.2 % (ref 40.5–52.5)
HEMOGLOBIN: 11.5 G/DL (ref 13.5–17.5)
IRON SATURATION: 17 % (ref 20–50)
IRON: 43 UG/DL (ref 59–158)
LV EF: 28 %
LVEF MODALITY: NORMAL
MAGNESIUM: 2.1 MG/DL (ref 1.8–2.4)
MCH RBC QN AUTO: 29.9 PG (ref 26–34)
MCHC RBC AUTO-ENTMCNC: 32.7 G/DL (ref 31–36)
MCV RBC AUTO: 91.4 FL (ref 80–100)
PDW BLD-RTO: 13.6 % (ref 12.4–15.4)
PERFORMED ON: ABNORMAL
PLATELET # BLD: 143 K/UL (ref 135–450)
PMV BLD AUTO: 11.2 FL (ref 5–10.5)
POTASSIUM SERPL-SCNC: 4.1 MMOL/L (ref 3.5–5.1)
PRO-BNP: 7442 PG/ML (ref 0–449)
RBC # BLD: 3.85 M/UL (ref 4.2–5.9)
SODIUM BLD-SCNC: 139 MMOL/L (ref 136–145)
TOTAL IRON BINDING CAPACITY: 260 UG/DL (ref 260–445)
TOTAL PROTEIN: 6.6 G/DL (ref 6.4–8.2)
WBC # BLD: 7.1 K/UL (ref 4–11)

## 2023-02-09 PROCEDURE — 82728 ASSAY OF FERRITIN: CPT

## 2023-02-09 PROCEDURE — 80048 BASIC METABOLIC PNL TOTAL CA: CPT

## 2023-02-09 PROCEDURE — 6370000000 HC RX 637 (ALT 250 FOR IP): Performed by: INTERNAL MEDICINE

## 2023-02-09 PROCEDURE — 2060000000 HC ICU INTERMEDIATE R&B

## 2023-02-09 PROCEDURE — 83540 ASSAY OF IRON: CPT

## 2023-02-09 PROCEDURE — C8929 TTE W OR WO FOL WCON,DOPPLER: HCPCS

## 2023-02-09 PROCEDURE — 6360000002 HC RX W HCPCS: Performed by: INTERNAL MEDICINE

## 2023-02-09 PROCEDURE — 99233 SBSQ HOSP IP/OBS HIGH 50: CPT | Performed by: CLINICAL NURSE SPECIALIST

## 2023-02-09 PROCEDURE — 80076 HEPATIC FUNCTION PANEL: CPT

## 2023-02-09 PROCEDURE — 2580000003 HC RX 258: Performed by: INTERNAL MEDICINE

## 2023-02-09 PROCEDURE — 83550 IRON BINDING TEST: CPT

## 2023-02-09 PROCEDURE — 83880 ASSAY OF NATRIURETIC PEPTIDE: CPT

## 2023-02-09 PROCEDURE — 85027 COMPLETE CBC AUTOMATED: CPT

## 2023-02-09 PROCEDURE — 36415 COLL VENOUS BLD VENIPUNCTURE: CPT

## 2023-02-09 PROCEDURE — 83735 ASSAY OF MAGNESIUM: CPT

## 2023-02-09 RX ADMIN — POTASSIUM CHLORIDE 40 MEQ: 1500 TABLET, EXTENDED RELEASE ORAL at 14:56

## 2023-02-09 RX ADMIN — INSULIN LISPRO 1 UNITS: 100 INJECTION, SOLUTION INTRAVENOUS; SUBCUTANEOUS at 12:44

## 2023-02-09 RX ADMIN — Medication 10 ML: at 21:04

## 2023-02-09 RX ADMIN — Medication 10 ML: at 09:49

## 2023-02-09 RX ADMIN — ATORVASTATIN CALCIUM 40 MG: 40 TABLET, FILM COATED ORAL at 09:29

## 2023-02-09 RX ADMIN — CARVEDILOL 3.12 MG: 3.12 TABLET, FILM COATED ORAL at 09:29

## 2023-02-09 RX ADMIN — SENNOSIDES 8.6 MG: 8.6 TABLET, FILM COATED ORAL at 21:02

## 2023-02-09 RX ADMIN — AMLODIPINE BESYLATE 5 MG: 5 TABLET ORAL at 09:29

## 2023-02-09 RX ADMIN — ENOXAPARIN SODIUM 30 MG: 100 INJECTION SUBCUTANEOUS at 20:57

## 2023-02-09 RX ADMIN — INSULIN GLARGINE 30 UNITS: 100 INJECTION, SOLUTION SUBCUTANEOUS at 21:02

## 2023-02-09 RX ADMIN — CARVEDILOL 3.12 MG: 3.12 TABLET, FILM COATED ORAL at 17:05

## 2023-02-09 RX ADMIN — ENOXAPARIN SODIUM 30 MG: 100 INJECTION SUBCUTANEOUS at 09:32

## 2023-02-09 RX ADMIN — TAMSULOSIN HYDROCHLORIDE 0.8 MG: 0.4 CAPSULE ORAL at 20:57

## 2023-02-09 RX ADMIN — FUROSEMIDE 40 MG: 10 INJECTION, SOLUTION INTRAMUSCULAR; INTRAVENOUS at 09:33

## 2023-02-09 RX ADMIN — FUROSEMIDE 40 MG: 10 INJECTION, SOLUTION INTRAMUSCULAR; INTRAVENOUS at 17:08

## 2023-02-09 RX ADMIN — ASPIRIN 325 MG: 325 TABLET ORAL at 09:29

## 2023-02-09 RX ADMIN — INSULIN GLARGINE 30 UNITS: 100 INJECTION, SOLUTION SUBCUTANEOUS at 09:29

## 2023-02-09 NOTE — PROGRESS NOTES
[facility] PROGRESS NOTE    2/9/2023 10:49 AM        Name: Khari Washburn . Admitted: 2/8/2023  Primary Care Provider: No primary care provider on file. (Tel: None)      Brief History: 70 yo male with hx CAD/CABG, HTN, HLD, DM2, CKD stage III. He presented to [facility] with chest pressure, shortness of breath, edema. He was recently started on Lasix by PCP with no improvement. Troponin was noted to be elevated (0.45). He was transferred to [facility] for NSTEMI and acute CHF. Subjective:  Up in bedside chair. Good response to IV Lasix. Reports he is feeling better. Denies chest pain, no shortness of breath at rest. Did sleep with HOB elevated last night. Believes edema is improved.      Reviewed interval ancillary notes    Current Medications  sodium chloride flush 0.9 % injection 10 mL, 2 times per day  sodium chloride flush 0.9 % injection 10 mL, PRN  0.9 % sodium chloride infusion, PRN  ondansetron (ZOFRAN-ODT) disintegrating tablet 4 mg, Q8H PRN   Or  ondansetron (ZOFRAN) injection 4 mg, Q6H PRN  senna (SENOKOT) tablet 8.6 mg, Daily PRN  acetaminophen (TYLENOL) tablet 650 mg, Q6H PRN   Or  acetaminophen (TYLENOL) suppository 650 mg, Q6H PRN  enoxaparin Sodium (LOVENOX) injection 30 mg, BID  potassium chloride (KLOR-CON M) extended release tablet 40 mEq, PRN   Or  potassium bicarb-citric acid (EFFER-K) effervescent tablet 40 mEq, PRN   Or  potassium chloride 10 mEq/100 mL IVPB (Peripheral Line), PRN  magnesium sulfate 2000 mg in 50 mL IVPB premix, PRN  furosemide (LASIX) injection 40 mg, BID  amLODIPine (NORVASC) tablet 5 mg, Daily  aspirin tablet 325 mg, Daily  atorvastatin (LIPITOR) tablet 40 mg, Daily  tamsulosin (FLOMAX) capsule 0.8 mg, Nightly  insulin lispro (HUMALOG) injection vial 0-4 Units, TID WC  insulin lispro (HUMALOG) injection vial 0-4 Units, Nightly  dextrose bolus 10% 125 mL, PRN Or  dextrose bolus 10% 250 mL, PRN  glucagon (rDNA) injection 1 mg, PRN  dextrose 10 % infusion, Continuous PRN  insulin glargine (LANTUS) injection vial 30 Units, BID  perflutren lipid microspheres (DEFINITY) injection 1.5 mL, ONCE PRN  carvedilol (COREG) tablet 3.125 mg, BID WC        Objective:  BP (!) 117/56 Comment: RN notified  Pulse 81   Temp 97.8 °F (36.6 °C) (Oral)   Resp 18   Ht 6' 1\" (1.854 m)   Wt (!) 308 lb 8 oz (139.9 kg)   SpO2 97%   BMI 40.70 kg/m²     Intake/Output Summary (Last 24 hours) at 2/9/2023 1049  Last data filed at 2/9/2023 0555  Gross per 24 hour   Intake 840 ml   Output 3150 ml   Net -2310 ml      Wt Readings from Last 3 Encounters:   02/09/23 (!) 308 lb 8 oz (139.9 kg)       General appearance:  Appears comfortable  Eyes: Sclera clear. Pupils equal.  ENT: Moist oral mucosa. Trachea midline, no adenopathy. Cardiovascular: Regular rhythm, normal S1, S2. No murmur. +  edema in lower extremities  Respiratory: Not using accessory muscles. Good inspiratory effort. Diminished in bases, no wheeze or crackles. GI: Abdomen soft, no tenderness, not distended, normal bowel sounds  Musculoskeletal: No cyanosis in digits, neck supple  Neurology: CN 2-12 grossly intact. No speech or motor deficits  Psych: Normal affect. Alert and oriented in time, place and person  Skin: Warm, dry, normal turgor    Labs and Tests:  CBC:   Recent Labs     02/09/23  0545   WBC 7.1   HGB 11.5*        BMP:    Recent Labs     02/08/23  0603 02/09/23  0546    139   K 4.0 4.1    104   CO2 25 24   BUN 53* 52*   CREATININE 1.6* 1.8*   GLUCOSE 170* 122*     Hepatic:   Recent Labs     02/09/23  0546   AST 18   ALT 16   BILITOT 0.6   ALKPHOS 96     CXR 2/8/2023:  Mild cardiomegaly with findings of pulmonary edema.       Problem List  Principal Problem:    Acute systolic (congestive) heart failure (HCC)  Active Problems:    Coronary artery disease involving native heart without angina pectoris    Stage 3 chronic kidney disease (HCC)    Type 2 diabetes mellitus with stage 3 chronic kidney disease, without long-term current use of insulin (HCC)    Peripheral edema  Resolved Problems:    * No resolved hospital problems. *       Assessment & Plan:   Acute CHF. Presents with edema and worsening dyspnea. CXR with pulmonary edema, proBNP 7200. Started on IV Lasix with good response. Renal numbers stable. Continue to diurese. Echo pending. Appreciate cardiology recs.   CKD stage III. Creatinine 1.6 on presentation, unclear of baseline. Stable with diuresis. Continue to monitor.   DM2. Takes Levemir, Actos and nateglinide at home. Being covered with Lantus and low dose correction. Reviewed BG values 122-202 today. Continue current regimen. Check A1c.   HTN. Controlled. Continue amlodipine, carvedilol. Continue to follow.   Elevated troponin / CAD. Troponin 0.46 in setting CHF and CKD. Hx CABG ~ 20 years ago. No recent testing. Denies chest pain. Echo pending. Continue asa, statin, beta blocker  HLD. , LDL 45. Continue statin.   Normocytic anemia. Hgb 11.5. denies bloody, black or tarry stools. Likely component of CKD. Iron studies pending.       Diet: ADULT DIET; Regular; No Added Salt (3-4 gm); 2000 ml  Code:Full Code  DVT PPX: enoxaparin      ZION Harrison - CNP   2/9/2023 10:49 AM

## 2023-02-09 NOTE — PLAN OF CARE
Pt updated on current plan of care, VSS at this time. See flowsheet for assessment.    Problem: Discharge Planning  Goal: Discharge to home or other facility with appropriate resources  Outcome: Progressing     Problem: Safety - Adult  Goal: Free from fall injury  Outcome: Progressing     Problem: ABCDS Injury Assessment  Goal: Absence of physical injury  Outcome: Progressing     Problem: Respiratory - Adult  Goal: Achieves optimal ventilation and oxygenation  Outcome: Progressing     Problem: Cardiovascular - Adult  Goal: Maintains optimal cardiac output and hemodynamic stability  Outcome: Progressing  Flowsheets  Taken 2/9/2023 1022 by Jalen Raphael optimal cardiac output and hemodynamic stability: Monitor blood pressure and heart rate  Taken 2/9/2023 0930 by Radha Ritter RN  Maintains optimal cardiac output and hemodynamic stability: Monitor blood pressure and heart rate     Problem: Skin/Tissue Integrity - Adult  Goal: Skin integrity remains intact  Outcome: Progressing     Problem: Musculoskeletal - Adult  Goal: Return mobility to safest level of function  Outcome: Progressing     Problem: Gastrointestinal - Adult  Goal: Maintains or returns to baseline bowel function  Outcome: Progressing     Problem: Genitourinary - Adult  Goal: Absence of urinary retention  Outcome: Progressing     Problem: Metabolic/Fluid and Electrolytes - Adult  Goal: Electrolytes maintained within normal limits  Outcome: Progressing     Problem: Pain  Goal: Verbalizes/displays adequate comfort level or baseline comfort level  Outcome: Progressing

## 2023-02-09 NOTE — PROGRESS NOTES
The Jenkins Sleepiness Scale       The Jenkins Sleepiness Scale is widely used in the field of sleep medicine as a subjective measure of a patient's sleepiness. The test is a list of eight situations in which you rate your tendency to become sleepy on a scale of 0, no chance to 3, high chance of dozing. Your score is based on a scale of 0 to 24. The scale estimates whether you are experiencing excessive sleepiness that possibly requires medical attention.     How Sleepy Are You?  How sleepy are you to doze off or fall asleep in the following situations? You should rate your chances of dozing off, not just feeling tired. Even if you have not done some of these things recently try to determine how they would have affected you. For each situation, decide whether or not you would have:     0 = No chance of dozing 1 = Slight chance of dozing   2 = Moderate chance of  dozing 3 = High change of dozing       Situation                                                                                     Chance of Dozing    Sitting and reading  0 =  []  1 =    [] 2 =    [] 3 =    [x]    Watching TV  0 =  []  1 =    [] 2 =    [x] 3 =    []      Sitting inactive in public place (e.g., a theater or a meeting)  0 =  [x]  1 =    [] 2 =    [] 3 =    []    As a passenger in a car for an hour without a break          0  =  [x]  1 =    [] 2 =    [] 3 =    []    Lying down to rest in the afternoon when circumstances permit    0 =  []  1 =    [] 2 =    [x] 3 =    []    Sitting and talking to someone  0 =  [x]  1 =    [] 2 =    [] 3 =    []      Sitting quietly after a lunch without alcohol  0 =  []  1 =    [] 2 =    [] 3 =    [x]    In a car, while stopped for a few minutes in traffic                                                                      0 =  [x]  1 =    [] 2 =    [] 3 =    []    Total Score = 10    If your total score is 10 or greater, you are experiencing excessive sleepiness and should consider seeking a medical  follow-up. Take a copy of this screening test to your primary care physician on your next office visit. Interpretation:      0 -   7: It is unlikely that you are abnormally sleepy. 8 -   9: You have an average amount of daytime sleepiness. 10 - 15: You may be excessively sleepy depending on the situation. You may want to consider seeking medical attention. 16 - 24: You are excessively sleepy and should consider seeking medical attention.       Electronically signed by Maria Dolores Loaiza RCP on 2/9/2023 at 6:31 PM

## 2023-02-09 NOTE — PROGRESS NOTES
[facility]   Daily Progress Note      Admit Date:  2/8/2023    HPI:    Mr. Patrice Washington is a 69year old male with history of CAD/CABG 20 years ago at [facility]. He has not been following any cardiologist for 10 years, CKD    He is admitted with confusion, leg swelling, shortness of breath and hypoxia from [facility]. He saw PCP had edema and orthopnea, started lasix without any improvement. He was on actos which has been stopped. Cxr with mild pulmonary edema      Subjective:  Patient is being seen for heart failure. There were no acute overnight cardiac events. He is sitting up in the chair on room air. He is making great urine on IV lasix. He is very laid back and denies any shortness of breath, chest pain. He tells me his edema has been there for months  Creat 1.6-1.8 bnp [id number] weight unchanged     Objective:   /71   Pulse 71   Temp 98.3 °F (36.8 °C) (Oral)   Resp 16   Ht 6' 1\" (1.854 m)   Wt (!) 308 lb 8 oz (139.9 kg)   SpO2 99%   BMI 40.70 kg/m²     Intake/Output Summary (Last 24 hours) at 2/9/2023 0959  Last data filed at 2/9/2023 0555  Gross per 24 hour   Intake 840 ml   Output 3825 ml   Net -2985 ml          Physical Exam:  General:  Awake, alert, oriented in NAD  Skin:  Warm and dry. No unusual bruising or rash  Neck:  Supple. + JVD or carotid bruit appreciated  Chest:  Normal effort.   Decreased bilateral in bases  Cardiovascular:  RRR, S1/S2, no murmur/gallop/rub  Abdomen:  Soft, nontender, +bowel sounds  Extremities:  bilateral ankle edema to abdomen  Neurological: No focal deficits  Psychological: Normal mood and affect      Medications:    sodium chloride flush  10 mL IntraVENous 2 times per day    enoxaparin  30 mg SubCUTAneous BID    furosemide  40 mg IntraVENous BID    amLODIPine  5 mg Oral Daily    aspirin  325 mg Oral Daily    atorvastatin  40 mg Oral Daily    tamsulosin  0.8 mg Oral Nightly    insulin lispro  0-4 Units SubCUTAneous TID     insulin lispro  0-4 Units SubCUTAneous Nightly    insulin glargine  30 Units SubCUTAneous BID    carvedilol  3.125 mg Oral BID WC      sodium chloride      dextrose         Lab Data:  CBC: No results for input(s): WBC, HGB, PLT in the last 72 hours. BMP:    Recent Labs     02/08/23  0603 02/09/23  0546    139   K 4.0 4.1   CO2 25 24   BUN 53* 52*   CREATININE 1.6* 1.8*     INR:    Recent Labs     02/08/23  0603   INR 1.16*     BNP:    Recent Labs     02/08/23  0603 02/09/23  0546   PROBNP 7,200* 7,442*         Diagnostics:  Echo 2/9/2023 pending    Assessment:    1. Acute heart failure  2. CAD, status post CABG 20 years ago  3. Chronic kidney disease  4. Diabetes, mulitple drugs  5. Hyperlipidemia  6. Edema       Plan:    Continue IV lasix 40 mg twice a day   Consider adding jardiance for HF and renal insufficiency  Continue coreg 3.125 mg twice a day   CHF nurse following for diet education, fluid restriction and daily weights  Echo pending  Will check cbc and add iron studies  Stay off actos    Most likely needs ischemic workup but will await diuresis      Discussed with patient who is agreeable with plan of care. Thank you for allowing me to participate in the care of your patient.     ZION Meredith - CNS, CNS

## 2023-02-09 NOTE — CONSULTS
[address]  1945    History:  No past medical history on file. ECHO:  2/9/23   Summary   Left ventricular size is moderately increased. Left ventricular systolic function is severely depressed with ejection   fraction estimated at 25-30%. Global hypokinesis with hypokinesis more pronounced in the inferior and   septal walls. There is mild concentric left ventricular hypertrophy. Grade II diastolic dysfunction with elevated LV filling pressures. Mild to moderate mitral regurgitation. The left atrium is mildly dilated. Moderate tricuspid regurgitation. Systolic pulmonary artery pressure (SPAP) is normal and estimated at 30 mmHg   (right atrial pressure 8 mmHg). ACE/ARB/ARNi: Patient not on- consider if not contraindicated     BB: coreg 3.125 mg bid  Aldosterone Antagonist: Patient not on- consider if not contraindicated     SGLT2: Patient not on- consider if not contraindicated       History of sleep apnea: No [place] Screen ordered: Yes    DM History: yes-        [id number] to see any other previous EMR notes  Code Status: full   Discharge plans: from home    Family Present: sylvain Hein was admitted to the hospital with increased shortness of breath. Patient was noted to have orthopnea, PND, dyspnea, abdomen fullness, and lower leg edema along with fatigue. Patient states this is new for him. He does have remote CAD history with CABG. States he does not follow with a cardiologist, just PCP. Patient uses extra added salt. He is unsure of his fluid intake. He states compliance with medications and follow ups. Patient provided with both written and verbal education on CHF signs/ symptoms, causes, discharge medications, daily weights, low sodium diet, activity, and follow-up. Pt to call if gains 3 pounds in one day or 5 pounds in one week.  Mutually agreed upon goals were discussed such as calling the MD as soon as they recognize symptoms and weight gain, maintaining proper diet, taking medications as prescribed, joining cardiac rehab when able. Also reviewed importance of risk factor reduction. Patient provided with CHF Zone Management tool and CHF symptoms magnet. Discussed importance of lifestyle changes: encouraged daily weights    PATIENT/CAREGIVER TEACHING:    Level of patient/caregiver understanding able to:   [ x] Verbalize understanding [ ] Demonstrate understanding [ ] Teach back   [ ] Needs reinforcement [ ] Other:       Time spent teachin mins    1. WEIGHT: Admit Weight: (!) 308 lb 6.4 oz (139.9 kg)      Today  Weight: (!) 308 lb 8 oz (139.9 kg)   2. I/O   Intake/Output Summary (Last 24 hours) at 2023 1526  Last data filed at 2023 1305  Gross per 24 hour   Intake 1080 ml   Output 2500 ml   Net -1420 ml       Recommendations:   1. He will need to follow with cardiology after discharge closer to home- from [facility]-  2. Educate further on fluid restriction 48 oz- 64 oz during inpatient stay so he can understand how to measure intake at home. 3. Continue to educate on S/S.   4. Emphasize daily weights, diet, and knowing when and who to call  5. Provided patient with CHF Resource Line for questions and concerns. 6. Patient would benefit from cardiac rehab as an outpatient. Flyer provided.        Tristan Saul RN 2023 3:26 PM

## 2023-02-10 PROBLEM — D50.9 IRON DEFICIENCY ANEMIA: Status: ACTIVE | Noted: 2023-02-10

## 2023-02-10 LAB
ANION GAP SERPL CALCULATED.3IONS-SCNC: 10 MMOL/L (ref 3–16)
BUN BLDV-MCNC: 54 MG/DL (ref 7–20)
CALCIUM SERPL-MCNC: 8.7 MG/DL (ref 8.3–10.6)
CHLORIDE BLD-SCNC: 102 MMOL/L (ref 99–110)
CO2: 26 MMOL/L (ref 21–32)
CREAT SERPL-MCNC: 1.8 MG/DL (ref 0.8–1.3)
ESTIMATED AVERAGE GLUCOSE: 148.5 MG/DL
GFR SERPL CREATININE-BSD FRML MDRD: 38 ML/MIN/{1.73_M2}
GLUCOSE BLD-MCNC: 122 MG/DL (ref 70–99)
GLUCOSE BLD-MCNC: 162 MG/DL (ref 70–99)
GLUCOSE BLD-MCNC: 169 MG/DL (ref 70–99)
GLUCOSE BLD-MCNC: 171 MG/DL (ref 70–99)
GLUCOSE BLD-MCNC: 175 MG/DL (ref 70–99)
GLUCOSE BLD-MCNC: 246 MG/DL (ref 70–99)
HBA1C MFR BLD: 6.8 %
MAGNESIUM: 2.4 MG/DL (ref 1.8–2.4)
OCCULT BLOOD DIAGNOSTIC: NORMAL
PERFORMED ON: ABNORMAL
POTASSIUM SERPL-SCNC: 4.1 MMOL/L (ref 3.5–5.1)
PRO-BNP: 5672 PG/ML (ref 0–449)
SODIUM BLD-SCNC: 138 MMOL/L (ref 136–145)

## 2023-02-10 PROCEDURE — 2060000000 HC ICU INTERMEDIATE R&B

## 2023-02-10 PROCEDURE — 80048 BASIC METABOLIC PNL TOTAL CA: CPT

## 2023-02-10 PROCEDURE — 83880 ASSAY OF NATRIURETIC PEPTIDE: CPT

## 2023-02-10 PROCEDURE — 99233 SBSQ HOSP IP/OBS HIGH 50: CPT | Performed by: CLINICAL NURSE SPECIALIST

## 2023-02-10 PROCEDURE — 2580000003 HC RX 258: Performed by: CLINICAL NURSE SPECIALIST

## 2023-02-10 PROCEDURE — 6360000002 HC RX W HCPCS: Performed by: INTERNAL MEDICINE

## 2023-02-10 PROCEDURE — 83735 ASSAY OF MAGNESIUM: CPT

## 2023-02-10 PROCEDURE — 94760 N-INVAS EAR/PLS OXIMETRY 1: CPT

## 2023-02-10 PROCEDURE — 82270 OCCULT BLOOD FECES: CPT

## 2023-02-10 PROCEDURE — 83036 HEMOGLOBIN GLYCOSYLATED A1C: CPT

## 2023-02-10 PROCEDURE — 2580000003 HC RX 258: Performed by: INTERNAL MEDICINE

## 2023-02-10 PROCEDURE — 6360000002 HC RX W HCPCS: Performed by: CLINICAL NURSE SPECIALIST

## 2023-02-10 PROCEDURE — 6370000000 HC RX 637 (ALT 250 FOR IP): Performed by: INTERNAL MEDICINE

## 2023-02-10 PROCEDURE — 36415 COLL VENOUS BLD VENIPUNCTURE: CPT

## 2023-02-10 RX ADMIN — CARVEDILOL 3.12 MG: 3.12 TABLET, FILM COATED ORAL at 08:03

## 2023-02-10 RX ADMIN — IRON SUCROSE 200 MG: 20 INJECTION, SOLUTION INTRAVENOUS at 10:47

## 2023-02-10 RX ADMIN — FUROSEMIDE 40 MG: 10 INJECTION, SOLUTION INTRAMUSCULAR; INTRAVENOUS at 08:03

## 2023-02-10 RX ADMIN — ASPIRIN 325 MG: 325 TABLET ORAL at 08:03

## 2023-02-10 RX ADMIN — CARVEDILOL 3.12 MG: 3.12 TABLET, FILM COATED ORAL at 16:41

## 2023-02-10 RX ADMIN — TAMSULOSIN HYDROCHLORIDE 0.8 MG: 0.4 CAPSULE ORAL at 21:24

## 2023-02-10 RX ADMIN — ENOXAPARIN SODIUM 30 MG: 100 INJECTION SUBCUTANEOUS at 22:12

## 2023-02-10 RX ADMIN — AMLODIPINE BESYLATE 5 MG: 5 TABLET ORAL at 08:02

## 2023-02-10 RX ADMIN — FUROSEMIDE 40 MG: 10 INJECTION, SOLUTION INTRAMUSCULAR; INTRAVENOUS at 17:29

## 2023-02-10 RX ADMIN — INSULIN GLARGINE 30 UNITS: 100 INJECTION, SOLUTION SUBCUTANEOUS at 21:24

## 2023-02-10 RX ADMIN — ENOXAPARIN SODIUM 30 MG: 100 INJECTION SUBCUTANEOUS at 08:05

## 2023-02-10 RX ADMIN — Medication 10 ML: at 21:25

## 2023-02-10 RX ADMIN — Medication 10 ML: at 08:09

## 2023-02-10 RX ADMIN — ATORVASTATIN CALCIUM 40 MG: 40 TABLET, FILM COATED ORAL at 08:03

## 2023-02-10 RX ADMIN — INSULIN GLARGINE 30 UNITS: 100 INJECTION, SOLUTION SUBCUTANEOUS at 08:04

## 2023-02-10 ASSESSMENT — PAIN SCALES - GENERAL: PAINLEVEL_OUTOF10: 4

## 2023-02-10 ASSESSMENT — PAIN DESCRIPTION - LOCATION: LOCATION: LEG

## 2023-02-10 NOTE — PLAN OF CARE
Problem: Discharge Planning  Goal: Discharge to home or other facility with appropriate resources  2/10/2023 0349 by Pooja Tobin RN  Outcome: Progressing  Flowsheets (Taken 2/9/2023 2000)  Discharge to home or other facility with appropriate resources:   Identify barriers to discharge with patient and caregiver   Arrange for needed discharge resources and transportation as appropriate   Identify discharge learning needs (meds, wound care, etc)   Refer to discharge planning if patient needs post-hospital services based on physician order or complex needs related to functional status, cognitive ability or social support system     Problem: Safety - Adult  Goal: Free from fall injury  2/10/2023 0349 by Pooja Tobin RN  Outcome: Progressing     Problem: ABCDS Injury Assessment  Goal: Absence of physical injury  2/10/2023 0349 by Pooja Tobin RN  Outcome: Progressing     Problem: Respiratory - Adult  Goal: Achieves optimal ventilation and oxygenation  2/10/2023 0349 by Pooja Tobin RN  Outcome: Progressing  Flowsheets (Taken 2/9/2023 2000)  Achieves optimal ventilation and oxygenation:   Assess for changes in respiratory status   Assess for changes in mentation and behavior   Assess and instruct to report shortness of breath or any respiratory difficulty

## 2023-02-10 NOTE — PROGRESS NOTES
[facility]   Daily Progress Note      Admit Date:  2/8/2023    HPI:    Mr. Jesus Lopez is a 69year old male with history of CAD/CABG 20 years ago at [facility]. 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 He saw PCP had edema and orthopnea, started lasix without any improvement. He was on actos which has been stopped. Cxr with mild pulmonary edema      Subjective:  Patient is being seen for heart failure. There were no acute overnight cardiac events. He is sitting up in the chair on room air. He is sitting up in the chair diuresing well. Echo discussed with him. No increased shortness of breath, chest pain or palpitations. Creat 1.6-1.8 bnp [id number] weight 308-298 and -5.8 L out    Objective:   /67   Pulse 76   Temp 97.2 °F (36.2 °C) (Oral)   Resp 18   Ht 6' 1\" (1.854 m)   Wt 298 lb 3.2 oz (135.3 kg)   SpO2 100%   BMI 39.34 kg/m²     Intake/Output Summary (Last 24 hours) at 2/10/2023 0847  Last data filed at 2/10/2023 0335  Gross per 24 hour   Intake 720 ml   Output 2765 ml   Net -2045 ml          Physical Exam:  General:  Awake, alert, oriented in NAD  Skin:  Warm and dry. No unusual bruising or rash  Neck:  Supple. + JVD or carotid bruit appreciated  Chest:  Normal effort.   Decreased bilateral in bases  Cardiovascular:  RRR, S1/S2, no murmur/gallop/rub  Abdomen:  Soft, nontender, +bowel sounds  Extremities:  bilateral ankle edema to abdomen  Neurological: No focal deficits  Psychological: Normal mood and affect      Medications:    sodium chloride flush  10 mL IntraVENous 2 times per day    enoxaparin  30 mg SubCUTAneous BID    furosemide  40 mg IntraVENous BID    amLODIPine  5 mg Oral Daily    aspirin  325 mg Oral Daily    atorvastatin  40 mg Oral Daily    tamsulosin  0.8 mg Oral Nightly    insulin lispro  0-4 Units SubCUTAneous TID WC    insulin lispro  0-4 Units SubCUTAneous Nightly    insulin glargine  30 Units SubCUTAneous BID    carvedilol  3.125 mg Oral BID WC      sodium chloride      dextrose         Lab Data:  CBC:   Recent Labs     02/09/23  0545   WBC 7.1   HGB 11.5*        BMP:    Recent Labs     02/08/23  0603 02/09/23  0546 02/10/23  0450    139 138   K 4.0 4.1 4.1   CO2 25 24 26   BUN 53* 52* 54*   CREATININE 1.6* 1.8* 1.8*     INR:    Recent Labs     02/08/23  0603   INR 1.16*     BNP:    Recent Labs     02/08/23  0603 02/09/23  0546 02/10/23  0450   PROBNP 7,200* 7,442* 5,672*         Diagnostics:  Echo 2/9/2023  Summary  Left ventricular size is moderately increased. Left ventricular systolic function is severely depressed with ejection fraction estimated at 25-30%. Global hypokinesis with hypokinesis more pronounced in the inferior and septal walls. There is mild concentric left ventricular hypertrophy. Grade II diastolic dysfunction with elevated LV filling pressures. Mild to moderate mitral regurgitation. The left atrium is mildly dilated. Moderate tricuspid regurgitation. Systolic pulmonary artery pressure (SPAP) is normal and estimated at 30 mmHg (right atrial pressure 8 mmHg). Assessment:    1. Acute systolic heart failure  2. CAD, status post CABG 20 years ago  3. Chronic kidney disease  4. Diabetes, mulitple drugs  5. Hyperlipidemia  6. Edema   7. Iron deficient anemia      Plan:    Continue IV lasix 40 mg twice a day   Consider adding jardiance for HF and renal insufficiency once he is diuresed  Continue coreg 3.125 mg twice a day.   Can increase this for BP control  CHF nurse following for diet education, fluid restriction and daily weights  Will give IV venofer 200 mg x 5 doses  He will need ace/arb/arni prior to discharge  Stay off actos  He will need to have interventional to see next week due to drop in LVEF  No aldactone due to renal disease  Will stop norvasc    He has several more days of diuresing    Discussed with patient who is agreeable with plan of care. Thank you for allowing me to participate in the care of your patient.     Neri Baca, APRN - CNS, CNS

## 2023-02-10 NOTE — PROGRESS NOTES
[facility] PROGRESS NOTE    2/10/2023 12:03 PM        Name: Sharyn Vela . Admitted: 2/8/2023  Primary Care Provider: No primary care provider on file. (Tel: None)      Brief History: 68 yo male with hx CAD/CABG, HTN, HLD, DM2, CKD stage III. He presented to [facility] with chest pressure, shortness of breath, edema. He was recently started on Lasix by PCP with no improvement. Troponin was noted to be elevated (0.45). He was transferred to [facility] for NSTEMI and acute CHF. Subjective:  Up in bedside chair. Reports he is feeling better, breathing improved. Good diuresis with IV Lasix, creatinine stable. Denies chest pain, shortness of breath, abdominal pain, nausea, swelling improved. Weight down 10 lbs from admission.     Reviewed interval ancillary notes    Current Medications  iron sucrose (VENOFER) 200 mg in sodium chloride 0.9 % 100 mL IVPB, Q24H  sodium chloride flush 0.9 % injection 10 mL, 2 times per day  sodium chloride flush 0.9 % injection 10 mL, PRN  0.9 % sodium chloride infusion, PRN  ondansetron (ZOFRAN-ODT) disintegrating tablet 4 mg, Q8H PRN   Or  ondansetron (ZOFRAN) injection 4 mg, Q6H PRN  senna (SENOKOT) tablet 8.6 mg, Daily PRN  acetaminophen (TYLENOL) tablet 650 mg, Q6H PRN   Or  acetaminophen (TYLENOL) suppository 650 mg, Q6H PRN  enoxaparin Sodium (LOVENOX) injection 30 mg, BID  potassium chloride (KLOR-CON M) extended release tablet 40 mEq, PRN   Or  potassium bicarb-citric acid (EFFER-K) effervescent tablet 40 mEq, PRN   Or  potassium chloride 10 mEq/100 mL IVPB (Peripheral Line), PRN  magnesium sulfate 2000 mg in 50 mL IVPB premix, PRN  furosemide (LASIX) injection 40 mg, BID  aspirin tablet 325 mg, Daily  atorvastatin (LIPITOR) tablet 40 mg, Daily  tamsulosin (FLOMAX) capsule 0.8 mg, Nightly  insulin lispro (HUMALOG) injection vial 0-4 Units, TID WC  insulin lispro (HUMALOG) injection vial 0-4 Units, Nightly  dextrose bolus 10% 125 mL, PRN   Or  dextrose bolus 10% 250 mL, PRN  glucagon (rDNA) injection 1 mg, PRN  dextrose 10 % infusion, Continuous PRN  insulin glargine (LANTUS) injection vial 30 Units, BID  perflutren lipid microspheres (DEFINITY) injection 1.5 mL, ONCE PRN  carvedilol (COREG) tablet 3.125 mg, BID WC      Objective:  /68   Pulse 77   Temp 97.4 °F (36.3 °C) (Oral)   Resp 18   Ht 6' 1\" (1.854 m)   Wt 298 lb 3.2 oz (135.3 kg)   SpO2 98%   BMI 39.34 kg/m²     Intake/Output Summary (Last 24 hours) at 2/10/2023 1203  Last data filed at 2/10/2023 1034  Gross per 24 hour   Intake 840 ml   Output 2615 ml   Net -1775 ml      Wt Readings from Last 3 Encounters:   02/10/23 298 lb 3.2 oz (135.3 kg)     General:  Awake, alert, oriented in NAD  Skin:  Warm and dry. No unusual bruising or rash  Neck:  Supple. No JVD appreciated  Chest:  Normal effort. Clear to auscultation, no wheezes/rhonchi/rales  Cardiovascular:  RRR, normal S1/S2, no murmur/gallop/rub  Abdomen:  Soft, nontender, +bowel sounds  Extremities:  + BLE edema extending to abdomen  Neurological: No focal deficits  Psychological: Normal mood and affect      Labs and Tests:  CBC:   Recent Labs     02/09/23  0545   WBC 7.1   HGB 11.5*        BMP:    Recent Labs     02/08/23  0603 02/09/23  0546 02/10/23  0450    139 138   K 4.0 4.1 4.1    104 102   CO2 25 24 26   BUN 53* 52* 54*   CREATININE 1.6* 1.8* 1.8*   GLUCOSE 170* 122* 162*     Hepatic:   Recent Labs     02/09/23  0546   AST 18   ALT 16   BILITOT 0.6   ALKPHOS 96     CXR 2/8/2023:  Mild cardiomegaly with findings of pulmonary edema. Echo 2/9/2023:  Summary   Left ventricular size is moderately increased. Left ventricular systolic function is severely depressed with ejection   fraction estimated at 25-30%. Global hypokinesis with hypokinesis more pronounced in the inferior and septal walls.    There is mild concentric left ventricular hypertrophy. Grade II diastolic dysfunction with elevated LV filling pressures. Mild to moderate mitral regurgitation. The left atrium is mildly dilated. Moderate tricuspid regurgitation. Systolic pulmonary artery pressure (SPAP) is normal and estimated at 30 mmHg   (right atrial pressure 8 mmHg). Problem List  Principal Problem:    Acute heart failure (HCC)  Active Problems:    Coronary artery disease due to calcified coronary lesion    Stage 3 chronic kidney disease (HCC)    Type 2 diabetes mellitus with stage 3 chronic kidney disease, without long-term current use of insulin (HCC)    Edema    Hyperlipidemia    Iron deficiency anemia  Resolved Problems:    * No resolved hospital problems. *       Assessment & Plan:   Acute s/dCHF. Presents with edema and worsening dyspnea. CXR with pulmonary edema, proBNP 7200. Echo reveals EF 25-30%, grade II diastolic dysfunction. Started on IV Lasix with good response, weight is down 10 lbs. Renal numbers stable. Continue to diurese. Appreciate cardiology recs. New onset cardiomyopathy. EF 25-30% on echo with global hypokinesis more pronounced in inferior and septal walls. Continue carvedilol. Hold ACE/ARB/ARNI with diuresis. Interventional cardiology to see. CKD stage III. Creatinine 1.6 on presentation, unclear of baseline. Stable with diuresis. Consult nephrology as patient may need [facility], anticipate ACE/ARB/ARNI for HF management. Continue to monitor. DM2. A1c 6.8. Takes Levemir, Actos and nateglinide at home. Being covered with Lantus and low dose correction. Reviewed BG values 122-175 today. Continue current regimen. Recommend not resuming Actos on DC as it can worsen peripheral edema. HTN. Controlled. Continue amlodipine, carvedilol. Continue to follow. Elevated troponin / CAD. Troponin 0.46 in setting CHF and CKD. Hx CABG ~ 20 years ago. No recent testing. Denies chest pain.  Echo with EF 25-30% with global hypokinesis more pronounced in inferior and septal walls. Interventional cardiology to see. Continue asa, statin, beta blocker  HLD. , LDL 45. Continue statin. Normocytic anemia. Hgb 11.5. denies bloody, black or tarry stools. Possible component of CKD. Iron levels low, to receive IV Venofer. Check stool occult blood. Diet: ADULT DIET; Regular;  No Added Salt (3-4 gm); 2000 ml  Code:Full Code  DVT PPX: enoxaparin      ZION Devi - CNP   2/10/2023 12:03 PM

## 2023-02-10 NOTE — PROGRESS NOTES
Bedside handoff report received and pt assessment completed. A&O x4 with VSS. SR per monitor with PAC, PVC and pt had 6 beat run of [facility] which self terminated. Sitting up in chair and denies any pain, discomfort or SOB. Ambulating throughout room with steady gait and no CAMERON. BLE enzo with +2 edema, palpable pulses. Tolerating PO intake well. Voiding adequate urine per urinal. POC discussed and all questions addressed. Will cont to monitor.

## 2023-02-10 NOTE — PLAN OF CARE
Pt updated on current plan of care, see flowsheet for assessment.    Problem: Discharge Planning  Goal: Discharge to home or other facility with appropriate resources  2/10/2023 1344 by Yocasta Jordan RN  Outcome: Progressing  Flowsheets (Taken 2/10/2023 0935)  Discharge to home or other facility with appropriate resources: Identify barriers to discharge with patient and caregiver     Problem: Safety - Adult  Goal: Free from fall injury  2/10/2023 1344 by Yocasta Jordan RN  Outcome: Progressing     Problem: ABCDS Injury Assessment  Goal: Absence of physical injury  2/10/2023 1344 by Yocasta Jordan RN  Outcome: Progressing     Problem: Respiratory - Adult  Goal: Achieves optimal ventilation and oxygenation  2/10/2023 1344 by Yocasta Jordan RN  Outcome: Progressing  Flowsheets (Taken 2/10/2023 0935)  Achieves optimal ventilation and oxygenation: Assess for changes in respiratory status     Problem: Musculoskeletal - Adult  Goal: Return mobility to safest level of function  Outcome: Progressing  Flowsheets (Taken 2/10/2023 0935)  Return Mobility to Safest Level of Function: Assess patient stability and activity tolerance for standing, transferring and ambulating with or without assistive devices     Problem: Metabolic/Fluid and Electrolytes - Adult  Goal: Electrolytes maintained within normal limits  Outcome: Progressing  Flowsheets (Taken 2/10/2023 0935)  Electrolytes maintained within normal limits: Monitor labs and assess patient for signs and symptoms of electrolyte imbalances     Problem: Pain  Goal: Verbalizes/displays adequate comfort level or baseline comfort level  Outcome: Progressing

## 2023-02-10 NOTE — PROGRESS NOTES
Clinical Pharmacy Note    HF Core Measures Assessment    Latest EF: 25-30%    Entresto/ACE/ARB (EF<40%): NONE  Evidence based BB (bisoprolol, metoprolol XL, carvedilol) (EF<40%): coreg  Aldosterone Antagonist (EF<35%): NONE    (May consider the use of hydralazine + nitrate in patients intolerant to ACEI/ARB)    Hector Diaz, PharmD, [address] [place] [address]

## 2023-02-11 LAB
ANION GAP SERPL CALCULATED.3IONS-SCNC: 8 MMOL/L (ref 3–16)
BUN BLDV-MCNC: 58 MG/DL (ref 7–20)
CALCIUM SERPL-MCNC: 8.6 MG/DL (ref 8.3–10.6)
CHLORIDE BLD-SCNC: 105 MMOL/L (ref 99–110)
CO2: 28 MMOL/L (ref 21–32)
CREAT SERPL-MCNC: 1.8 MG/DL (ref 0.8–1.3)
GFR SERPL CREATININE-BSD FRML MDRD: 38 ML/MIN/{1.73_M2}
GLUCOSE BLD-MCNC: 139 MG/DL (ref 70–99)
GLUCOSE BLD-MCNC: 185 MG/DL (ref 70–99)
GLUCOSE BLD-MCNC: 218 MG/DL (ref 70–99)
GLUCOSE BLD-MCNC: 70 MG/DL (ref 70–99)
GLUCOSE BLD-MCNC: 96 MG/DL (ref 70–99)
PERFORMED ON: ABNORMAL
PERFORMED ON: NORMAL
POTASSIUM SERPL-SCNC: 3.8 MMOL/L (ref 3.5–5.1)
SODIUM BLD-SCNC: 141 MMOL/L (ref 136–145)

## 2023-02-11 PROCEDURE — 2580000003 HC RX 258: Performed by: INTERNAL MEDICINE

## 2023-02-11 PROCEDURE — 6370000000 HC RX 637 (ALT 250 FOR IP): Performed by: INTERNAL MEDICINE

## 2023-02-11 PROCEDURE — 99223 1ST HOSP IP/OBS HIGH 75: CPT | Performed by: INTERNAL MEDICINE

## 2023-02-11 PROCEDURE — 94760 N-INVAS EAR/PLS OXIMETRY 1: CPT

## 2023-02-11 PROCEDURE — 80048 BASIC METABOLIC PNL TOTAL CA: CPT

## 2023-02-11 PROCEDURE — 82570 ASSAY OF URINE CREATININE: CPT

## 2023-02-11 PROCEDURE — 2060000000 HC ICU INTERMEDIATE R&B

## 2023-02-11 PROCEDURE — 84156 ASSAY OF PROTEIN URINE: CPT

## 2023-02-11 PROCEDURE — 6360000002 HC RX W HCPCS: Performed by: INTERNAL MEDICINE

## 2023-02-11 PROCEDURE — 6360000002 HC RX W HCPCS: Performed by: CLINICAL NURSE SPECIALIST

## 2023-02-11 PROCEDURE — 2580000003 HC RX 258: Performed by: CLINICAL NURSE SPECIALIST

## 2023-02-11 PROCEDURE — 99232 SBSQ HOSP IP/OBS MODERATE 35: CPT | Performed by: NURSE PRACTITIONER

## 2023-02-11 PROCEDURE — 36415 COLL VENOUS BLD VENIPUNCTURE: CPT

## 2023-02-11 RX ADMIN — TAMSULOSIN HYDROCHLORIDE 0.8 MG: 0.4 CAPSULE ORAL at 21:39

## 2023-02-11 RX ADMIN — INSULIN GLARGINE 30 UNITS: 100 INJECTION, SOLUTION SUBCUTANEOUS at 21:39

## 2023-02-11 RX ADMIN — CARVEDILOL 3.12 MG: 3.12 TABLET, FILM COATED ORAL at 10:48

## 2023-02-11 RX ADMIN — FUROSEMIDE 40 MG: 10 INJECTION, SOLUTION INTRAMUSCULAR; INTRAVENOUS at 10:48

## 2023-02-11 RX ADMIN — ENOXAPARIN SODIUM 30 MG: 100 INJECTION SUBCUTANEOUS at 21:39

## 2023-02-11 RX ADMIN — ATORVASTATIN CALCIUM 40 MG: 40 TABLET, FILM COATED ORAL at 10:47

## 2023-02-11 RX ADMIN — IRON SUCROSE 200 MG: 20 INJECTION, SOLUTION INTRAVENOUS at 10:57

## 2023-02-11 RX ADMIN — Medication 10 ML: at 21:40

## 2023-02-11 RX ADMIN — ENOXAPARIN SODIUM 30 MG: 100 INJECTION SUBCUTANEOUS at 10:48

## 2023-02-11 RX ADMIN — INSULIN GLARGINE 30 UNITS: 100 INJECTION, SOLUTION SUBCUTANEOUS at 09:36

## 2023-02-11 RX ADMIN — SODIUM CHLORIDE, PRESERVATIVE FREE 10 ML: 5 INJECTION INTRAVENOUS at 17:52

## 2023-02-11 RX ADMIN — FUROSEMIDE 40 MG: 10 INJECTION, SOLUTION INTRAMUSCULAR; INTRAVENOUS at 17:52

## 2023-02-11 RX ADMIN — ASPIRIN 325 MG: 325 TABLET ORAL at 10:47

## 2023-02-11 RX ADMIN — Medication 10 ML: at 10:52

## 2023-02-11 RX ADMIN — CARVEDILOL 3.12 MG: 3.12 TABLET, FILM COATED ORAL at 17:52

## 2023-02-11 ASSESSMENT — PAIN SCALES - GENERAL: PAINLEVEL_OUTOF10: 3

## 2023-02-11 NOTE — PROGRESS NOTES
[facility] PROGRESS NOTE    2/11/2023 11:39 AM        Name: Nataliia Rebollar . Admitted: 2/8/2023  Primary Care Provider: No primary care provider on file. (Tel: None)      Brief History: 70 yo male with hx CAD/CABG, HTN, HLD, DM2, CKD stage III. He presented to [facility] with chest pressure, shortness of breath, edema. He was recently started on Lasix by PCP with no improvement. Troponin was noted to be elevated (0.45). He was transferred to [facility] for NSTEMI and acute CHF. Subjective:  Up in bedside chair. Says he slept better last night, still needs HOB elevated to sleep. Denies chest pain, lightheadedness. Weight down 11 lbs since admission.     Reviewed interval ancillary notes    Current Medications  iron sucrose (VENOFER) 200 mg in sodium chloride 0.9 % 100 mL IVPB, Q24H  sodium chloride flush 0.9 % injection 10 mL, 2 times per day  sodium chloride flush 0.9 % injection 10 mL, PRN  0.9 % sodium chloride infusion, PRN  ondansetron (ZOFRAN-ODT) disintegrating tablet 4 mg, Q8H PRN   Or  ondansetron (ZOFRAN) injection 4 mg, Q6H PRN  senna (SENOKOT) tablet 8.6 mg, Daily PRN  acetaminophen (TYLENOL) tablet 650 mg, Q6H PRN   Or  acetaminophen (TYLENOL) suppository 650 mg, Q6H PRN  enoxaparin Sodium (LOVENOX) injection 30 mg, BID  potassium chloride (KLOR-CON M) extended release tablet 40 mEq, PRN   Or  potassium bicarb-citric acid (EFFER-K) effervescent tablet 40 mEq, PRN   Or  potassium chloride 10 mEq/100 mL IVPB (Peripheral Line), PRN  magnesium sulfate 2000 mg in 50 mL IVPB premix, PRN  furosemide (LASIX) injection 40 mg, BID  aspirin tablet 325 mg, Daily  atorvastatin (LIPITOR) tablet 40 mg, Daily  tamsulosin (FLOMAX) capsule 0.8 mg, Nightly  insulin lispro (HUMALOG) injection vial 0-4 Units, TID WC  insulin lispro (HUMALOG) injection vial 0-4 Units, Nightly  dextrose bolus 10% 125 mL, PRN Or  dextrose bolus 10% 250 mL, PRN  glucagon (rDNA) injection 1 mg, PRN  dextrose 10 % infusion, Continuous PRN  insulin glargine (LANTUS) injection vial 30 Units, BID  perflutren lipid microspheres (DEFINITY) injection 1.5 mL, ONCE PRN  carvedilol (COREG) tablet 3.125 mg, BID WC      Objective:  BP (!) 95/55   Pulse 86   Temp 98.2 °F (36.8 °C) (Oral)   Resp 17   Ht 6' 1\" (1.854 m)   Wt 297 lb 9.6 oz (135 kg)   SpO2 96%   BMI 39.26 kg/m²     Intake/Output Summary (Last 24 hours) at 2/11/2023 1139  Last data filed at 2/11/2023 3814  Gross per 24 hour   Intake 970 ml   Output 1425 ml   Net -455 ml      Wt Readings from Last 3 Encounters:   02/11/23 297 lb 9.6 oz (135 kg)     General:  Awake, alert, oriented in NAD  Skin:  Warm and dry. No unusual bruising or rash  Neck:  Supple. No JVD appreciated  Chest:  Normal effort. Clear to auscultation, no wheezes/rhonchi/rales  Cardiovascular:  RRR, normal S1/S2, no murmur/gallop/rub  Abdomen:  Soft, nontender, +bowel sounds  Extremities:  + BLE edema extending to hips and abdomen  Neurological: No focal deficits  Psychological: Normal mood and affect       Labs and Tests:  CBC:   Recent Labs     02/09/23  0545   WBC 7.1   HGB 11.5*        BMP:    Recent Labs     02/09/23  0546 02/10/23  0450 02/11/23  0527    138 141   K 4.1 4.1 3.8    102 105   CO2 24 26 28   BUN 52* 54* 58*   CREATININE 1.8* 1.8* 1.8*   GLUCOSE 122* 162* 96     Hepatic:   Recent Labs     02/09/23  0546   AST 18   ALT 16   BILITOT 0.6   ALKPHOS 96     CXR 2/8/2023:  Mild cardiomegaly with findings of pulmonary edema. Echo 2/9/2023:  Summary   Left ventricular size is moderately increased. Left ventricular systolic function is severely depressed with ejection   fraction estimated at 25-30%. Global hypokinesis with hypokinesis more pronounced in the inferior and septal walls. There is mild concentric left ventricular hypertrophy.    Grade II diastolic dysfunction with elevated LV filling pressures. Mild to moderate mitral regurgitation. The left atrium is mildly dilated. Moderate tricuspid regurgitation. Systolic pulmonary artery pressure (SPAP) is normal and estimated at 30 mmHg   (right atrial pressure 8 mmHg). Problem List  Principal Problem:    Acute heart failure (HCC)  Active Problems:    Coronary artery disease due to calcified coronary lesion    Stage 3 chronic kidney disease (HCC)    Type 2 diabetes mellitus with stage 3 chronic kidney disease, without long-term current use of insulin (HCC)    Edema    Hyperlipidemia    Iron deficiency anemia  Resolved Problems:    * No resolved hospital problems. *       Assessment & Plan:   Acute s/dCHF. Presents with edema and worsening dyspnea. CXR with pulmonary edema, proBNP 7200. Echo reveals EF 25-30%, grade II diastolic dysfunction. Started on IV Lasix with good response, weight is down 11 lbs, creatinine stable. Remains volume overloaded, continue to diurese. Appreciate cardiology recs. New onset cardiomyopathy. EF 25-30% on echo with global hypokinesis more pronounced in inferior and septal walls. Continue carvedilol. Hold ACE/ARB/ARNI with diuresis. Interventional cardiology to see next week. CKD stage III. Creatinine 1.6 on presentation, unclear of baseline. Stable with diuresis, creatinine 1.8. Nephrology consulted in anticipation possible LHC, and need for ACE/ARB/ARNI for HF management. Continue to follow. DM2. A1c 6.8. Takes Levemir, Actos and nateglinide at home. Being covered with Lantus and low dose correction. Reviewed BG values  today. Continue current regimen. Recommend not resuming Actos on DC as it can worsen peripheral edema. HTN. Controlled. Continue amlodipine, carvedilol. Continue to follow. Elevated troponin / CAD. Troponin 0.46 in setting CHF and CKD. Hx CABG ~ 20 years ago. No recent testing. Denies chest pain.  Echo with EF 25-30% with global hypokinesis more pronounced in inferior and septal walls. Interventional cardiology to see next week. Continue asa, statin, beta blocker  HLD. , LDL 45. Continue statin. Normocytic anemia. Hgb 11.5, iron level low. Never had colonoscopy. Stool occult blood negative. Possible component of CKD. Receiving IV Venofer. Disposition: Anticipate home when medically stable. PT/OT consults pending. Diet: ADULT DIET; Regular;  No Added Salt (3-4 gm); 2000 ml  Code:Full Code  DVT PPX: enoxaparin      ZION Joshua CNP   2/11/2023 11:39 AM

## 2023-02-11 NOTE — CONSULTS
Nephrology Consult Note                                                                                                                                                                                                                                                                                                                                                               Office : [phone]     Fax :[phone]              Patient's Name: Curt Tyler  12:32 AM  2/11/2023    Reason for Consult:  CKD  3   Requesting Physician:  No primary care provider on file. Chief Complaint:  SOB     History of Present Ilness:     Curt Tyler is a 69 y.o. male who presented with confusion leg swelling shortness of breath and hypoxia patient is a transfer from outside ER. History of CABG CAD currently not seeing any cardiologist has not seen a doctor in a while who presents to ER with worsening shortness of breath and leg swelling orthopnea no fevers no chills nothing that makes it better or worse. History reviewed. No pertinent past medical history. No past surgical history on file. No family history on file.          Allergies:  Latex    Current Medications:    iron sucrose (VENOFER) 200 mg in sodium chloride 0.9 % 100 mL IVPB, Q24H  sodium chloride flush 0.9 % injection 10 mL, 2 times per day  sodium chloride flush 0.9 % injection 10 mL, PRN  0.9 % sodium chloride infusion, PRN  ondansetron (ZOFRAN-ODT) disintegrating tablet 4 mg, Q8H PRN   Or  ondansetron (ZOFRAN) injection 4 mg, Q6H PRN  senna (SENOKOT) tablet 8.6 mg, Daily PRN  acetaminophen (TYLENOL) tablet 650 mg, Q6H PRN   Or  acetaminophen (TYLENOL) suppository 650 mg, Q6H PRN  enoxaparin Sodium (LOVENOX) injection 30 mg, BID  potassium chloride (KLOR-CON M) extended release tablet 40 mEq, PRN   Or  potassium bicarb-citric acid (EFFER-K) effervescent tablet 40 mEq, PRN   Or  potassium chloride 10 mEq/100 mL IVPB (Peripheral Line), PRN  magnesium sulfate 2000 mg in 50 mL IVPB premix, PRN  furosemide (LASIX) injection 40 mg, BID  aspirin tablet 325 mg, Daily  atorvastatin (LIPITOR) tablet 40 mg, Daily  tamsulosin (FLOMAX) capsule 0.8 mg, Nightly  insulin lispro (HUMALOG) injection vial 0-4 Units, TID WC  insulin lispro (HUMALOG) injection vial 0-4 Units, Nightly  dextrose bolus 10% 125 mL, PRN   Or  dextrose bolus 10% 250 mL, PRN  glucagon (rDNA) injection 1 mg, PRN  dextrose 10 % infusion, Continuous PRN  insulin glargine (LANTUS) injection vial 30 Units, BID  perflutren lipid microspheres (DEFINITY) injection 1.5 mL, ONCE PRN  carvedilol (COREG) tablet 3.125 mg, BID WC        Review of Systems:   14 point ROS obtained but were negative except mentioned in HPI      Physical exam:     Vitals:  /71   Pulse 92   Temp 98.3 °F (36.8 °C) (Oral)   Resp 18   Ht 6' 1\" (1.854 m)   Wt 298 lb 3.2 oz (135.3 kg)   SpO2 98%   BMI 39.34 kg/m²   Constitutional:  OAA X3 NAD  Skin: no rash, turgor wnl  Heent:  eomi, mmm  Neck: no bruits or jvd noted  Cardiovascular:  S1, S2 without m/r/g  Respiratory: CTA B without w/r/r  Abdomen:  +bs, soft, nt, nd  Ext: + lower extremity edema  Psychiatric: mood and affect appropriate  Musculoskeletal:  Rom, muscular strength intact    Data:   Labs:  CBC:   Recent Labs     02/09/23  0545   WBC 7.1   HGB 11.5*        BMP:    Recent Labs     02/08/23  0603 02/09/23  0546 02/10/23  0450    139 138   K 4.0 4.1 4.1    104 102   CO2 25 24 26   BUN 53* 52* 54*   CREATININE 1.6* 1.8* 1.8*   GLUCOSE 170* 122* 162*     Ca/Mg/Phos:   Recent Labs     02/08/23  0603 02/09/23  0546 02/10/23  0450   CALCIUM 9.1 8.9 8.7   MG 2.10 2.10 2.40     Hepatic:   Recent Labs     02/09/23  0546   AST 18   ALT 16   BILITOT 0.6   ALKPHOS 96     Troponin:   Recent Labs     02/08/23  0603   TROPONINI 0.46*     BNP: No results for input(s): BNP in the last 72 hours.   Lipids:   Recent Labs     02/08/23  0603   CHOL 102   TRIG 69   HDL 43 LDLCALC 45   LABVLDL 14     ABGs: No results for input(s): PHART, PO2ART, GKB9FIH in the last 72 hours. INR:   Recent Labs     02/08/23  0603   INR 1.16*     UA:No results for input(s): Yulia Katherine, GLUCOSEU, BILIRUBINUR, Hermenia Zack, BLOODU, PHUR, PROTEINU, UROBILINOGEN, NITRU, LEUKOCYTESUR, Patrici Mtz in the last 72 hours. Urine Microscopic: No results for input(s): LABCAST, BACTERIA, COMU, HYALCAST, WBCUA, RBCUA, EPIU in the last 72 hours. Urine Culture: No results for input(s): LABURIN in the last 72 hours. Urine Chemistry: No results for input(s): Fernando Waterflow, PROTEINUR, NAUR in the last 72 hours. IMAGING:  XR CHEST PORTABLE   Final Result   Mild cardiomegaly with findings of pulmonary edema. Assessment/Plan   CKD 3     2. HTN    3. Anemia    4. Acid- base/ Electrolyte imbalance     5.  CHF     Plan   - IV diuresis   - Ur studies  - BNP  - 8 k   - monitor UO and renal function   - labs in am       Recommend to dose adjust all medications  based on renal functions  Maintain SBP> 90 mmHg   Daily weights   AVOID NSAIDs  Avoid Nephrotoxins  Monitor Intake/Output  Call if significant decrease in urine output                   Thank you for allowing us to participate in care of Holli Nelson MD  Feel free to contact me   [facility] [address]  Office : [phone]  Fax :[phone]

## 2023-02-11 NOTE — PLAN OF CARE
Shift assessment complete. Pt alert and oriented x4. Vital signs stable. Telemetry on. Pt denies any pain or shortness of breath. Respirations even and unlabored. Lung sounds are clear and diminished. Hat placed in toilet to collect stool specimen. Pt did notify RN of diarrhea today. Pt took medications per MAR. Denies any further needs at this time. Call light within reach.   Problem: Discharge Planning  Goal: Discharge to home or other facility with appropriate resources  2/10/2023 2225 by Karime Kirkland RN  Outcome: Progressing     Problem: Safety - Adult  Goal: Free from fall injury  2/10/2023 2225 by Karime Kirkland RN  Outcome: Progressing     Problem: ABCDS Injury Assessment  Goal: Absence of physical injury  2/10/2023 2225 by Karime Kirkland RN  Outcome: Progressing     Problem: Respiratory - Adult  Goal: Achieves optimal ventilation and oxygenation  2/10/2023 2225 by Karime Kirkland RN  Outcome: Progressing     Problem: Cardiovascular - Adult  Goal: Maintains optimal cardiac output and hemodynamic stability  2/10/2023 2225 by Karime Kirkland RN  Outcome: Progressing     Problem: Skin/Tissue Integrity - Adult  Goal: Skin integrity remains intact  2/10/2023 2225 by Karime Kirkland RN  Outcome: Progressing     Problem: Musculoskeletal - Adult  Goal: Return mobility to safest level of function  2/10/2023 2225 by Karime Kirkland RN  Outcome: Progressing

## 2023-02-11 NOTE — PROGRESS NOTES
[facility]              Progress Note      Admit Date 2/8/2023  HPI:   Mr. Sonja Cotter is a 69year old male with history of CAD/CABG 20 years ago at [facility] with Dr. Emerson Trujillo. He has not been following any cardiologist for 10 years, CKD stage III  He is admitted with confusion, leg swelling, shortness of breath and hypoxia from [facility]. He saw PCP had edema and orthopnea, started lasix without any improvement. He was on actos which has been stopped. Cxr with mild pulmonary edema         Interval history:  Creatinine stable at 1.8     BUN trending up : 52 > 54 > 58     proBNP : 7200 > 7442 > 5672     ~neg balance : neg 6.3 L     ~wt down 11# / 48 hrs     ~lasix 40 mg IV bid    Mr. Sonja Cotter denies chest pain,  palpitations  Subjective: up in chair. Tried sleeping flat but able >> elevated HOB. Walking short distance to BR without problems breathing. Kasi LE tender to palpitation        Scheduled Meds:   iron sucrose (VENOFER) iv piggyback 100 mL  200 mg IntraVENous Q24H    sodium chloride flush  10 mL IntraVENous 2 times per day    enoxaparin  30 mg SubCUTAneous BID    furosemide  40 mg IntraVENous BID    aspirin  325 mg Oral Daily    atorvastatin  40 mg Oral Daily    tamsulosin  0.8 mg Oral Nightly    insulin lispro  0-4 Units SubCUTAneous TID WC    insulin lispro  0-4 Units SubCUTAneous Nightly    insulin glargine  30 Units SubCUTAneous BID    carvedilol  3.125 mg Oral BID WC     Continuous Infusions:   sodium chloride      dextrose       PRN Meds:sodium chloride flush, sodium chloride, ondansetron **OR** ondansetron, senna, acetaminophen **OR** acetaminophen, potassium chloride **OR** potassium alternative oral replacement **OR** potassium chloride, magnesium sulfate, dextrose bolus **OR** dextrose bolus, glucagon (rDNA), dextrose, perflutren lipid microspheres       Objective:      Wt Readings from Last 3 Encounters:   02/11/23 297 lb 9.6 oz (135 kg)   Admit weight: Weight: (!) 308 lb 6.4 oz (139.9 kg)      Temperature range over 24hrs:   Temp  Av.9 °F (36.6 °C)  Min: 97.4 °F (36.3 °C)  Max: 98.3 °F (36.8 °C)  Current Respiratory Rate:  Resp: 17  Current Pulse:  Heart Rate: 86  Current Blood Pressure:  BP: (!) 95/55  24hr Blood Pressure Range:  Systolic (65GSJ), MOY , Min:95 , GGH:670   ; Diastolic (57KJT), LDP:18, Min:50, Max:71    Current Pulse Oximetry:  SpO2: 96 % RA      Intake/Output Summary (Last 24 hours) at 2023 1047  Last data filed at 2023 8976  Gross per 24 hour   Intake 970 ml   Output 1600 ml   Net -630 ml       Telemetry monitor: sinus rhythm, PAC    Physical Exam:  General:  Awake, alert, NAD  Psych : calm  Skin:  Warm and dry  Chest:  Clear to auscultation, respiration easy  Cardiovascular:  RRR with ectopy S1S2 no murmur to auscultation; no JVD  Abdomen: Bowel sounds normal, abd soft, non-tender  Extremities:  sonia LE edema: Lt pitting > Rt firm, + calf tenderness  : unremarkable      Imaging    Echo: 23:  Summary   Left ventricular size is moderately increased. Left ventricular systolic function is severely depressed with ejection   fraction estimated at 25-30%. Global hypokinesis with hypokinesis more pronounced in the inferior and   septal walls. There is mild concentric left ventricular hypertrophy. Grade II diastolic dysfunction with elevated LV filling pressures. Mild to moderate mitral regurgitation. The left atrium is mildly dilated. Moderate tricuspid regurgitation. Systolic pulmonary artery pressure (SPAP) is normal and estimated at 30 mmHg   (right atrial pressure 8 mmHg).      Lab Review     Renal Profile:   Lab Results   Component Value Date/Time    CREATININE 1.8 2023 05:27 AM    BUN 58 2023 05:27 AM     2023 05:27 AM    K 3.8 2023 05:27 AM     2023 05:27 AM    CO2 28 2023 05:27 AM     CBC:    Lab Results   Component Value Date/Time    WBC 7.1 2023 05:45 AM    RBC 3.85 2023 05:45 AM    HGB 11.5 02/09/2023 05:45 AM    HCT 35.2 02/09/2023 05:45 AM    MCV 91.4 02/09/2023 05:45 AM    RDW 13.6 02/09/2023 05:45 AM     02/09/2023 05:45 AM     BNP:  No results found for: BNP  Fasting Lipid Panel:    Lab Results   Component Value Date/Time    CHOL 102 02/08/2023 06:03 AM    HDL 43 02/08/2023 06:03 AM    TRIG 69 02/08/2023 06:03 AM     Cardiac Enzymes:    Lab Results   Component Value Date/Time    TROPONINI 0.46 02/08/2023 06:03 AM     PT/ INR   Lab Results   Component Value Date/Time    INR 1.16 02/08/2023 06:03 AM    PROTIME 14.8 02/08/2023 06:03 AM     PTT No results found for: PTT   Lab Results   Component Value Date/Time    MG 2.40 02/10/2023 04:50 AM      Lab Results   Component Value Date/Time    TSH 5.90 02/08/2023 06:03 AM       Assessment/Plan:     Patient Active Problem List   Diagnosis    Acute heart failure (HCC)    Coronary artery disease due to calcified coronary lesion    Stage 3 chronic kidney disease (HCC)    Type 2 diabetes mellitus with stage 3 chronic kidney disease, without long-term current use of insulin (HCC)    Edema    Hyperlipidemia    Iron deficiency anemia        Assessment:     1. Acute systolic heart failure   ~LVEF 25-30% ; HK more pronounced in the inferior and septal walls. Mitral regurg noted  2. CAD, status post CABG 20 years ago   ~troponin 0.46   ~denies angina   ~HK inferior / septal walls with EF 25-30%   ~BB / ASA / statin  3. Hyperlipidemia  4. Iron deficient anemia : received 2 of 3 doses of venofer to date        Plan:     Continue IV lasix 40 mg twice a day   Consider adding jardiance for HF and renal insufficiency once he is diuresed >> BUN trending up  Continue coreg 3.125 mg twice a day. Will give IV venofer 200 mg x 5 doses  He will need to have interventional to see next week due to drop in LVEF    The patient was seen for >35 minutes.  I reviewed interval history, physical exam, review of data including labs, imaging, development and implementation of treatment plan and coordination of complex care

## 2023-02-12 PROBLEM — E87.8 ELECTROLYTE IMBALANCE: Status: ACTIVE | Noted: 2023-02-12

## 2023-02-12 PROBLEM — I50.22 CHRONIC SYSTOLIC (CONGESTIVE) HEART FAILURE (HCC): Status: ACTIVE | Noted: 2023-02-12

## 2023-02-12 LAB
ANION GAP SERPL CALCULATED.3IONS-SCNC: 10 MMOL/L (ref 3–16)
BUN BLDV-MCNC: 59 MG/DL (ref 7–20)
CALCIUM SERPL-MCNC: 8.7 MG/DL (ref 8.3–10.6)
CHLORIDE BLD-SCNC: 104 MMOL/L (ref 99–110)
CO2: 26 MMOL/L (ref 21–32)
CREAT SERPL-MCNC: 1.9 MG/DL (ref 0.8–1.3)
CREATININE URINE: 139.8 MG/DL (ref 39–259)
CREATININE URINE: 88.7 MG/DL (ref 39–259)
GFR SERPL CREATININE-BSD FRML MDRD: 36 ML/MIN/{1.73_M2}
GLUCOSE BLD-MCNC: 165 MG/DL (ref 70–99)
GLUCOSE BLD-MCNC: 170 MG/DL (ref 70–99)
GLUCOSE BLD-MCNC: 214 MG/DL (ref 70–99)
GLUCOSE BLD-MCNC: 69 MG/DL (ref 70–99)
GLUCOSE BLD-MCNC: 95 MG/DL (ref 70–99)
GLUCOSE BLD-MCNC: 96 MG/DL (ref 70–99)
HCT VFR BLD CALC: 34.5 % (ref 40.5–52.5)
HEMOGLOBIN: 11.2 G/DL (ref 13.5–17.5)
MCH RBC QN AUTO: 29.8 PG (ref 26–34)
MCHC RBC AUTO-ENTMCNC: 32.5 G/DL (ref 31–36)
MCV RBC AUTO: 91.6 FL (ref 80–100)
PDW BLD-RTO: 13.9 % (ref 12.4–15.4)
PERFORMED ON: ABNORMAL
PERFORMED ON: NORMAL
PLATELET # BLD: 144 K/UL (ref 135–450)
PMV BLD AUTO: 10 FL (ref 5–10.5)
POTASSIUM SERPL-SCNC: 3.7 MMOL/L (ref 3.5–5.1)
PROTEIN PROTEIN: 14 MG/DL
PROTEIN PROTEIN: 9 MG/DL
PROTEIN/CREAT RATIO: 0.1 MG/DL
PROTEIN/CREAT RATIO: 0.1 MG/DL
RBC # BLD: 3.76 M/UL (ref 4.2–5.9)
SODIUM BLD-SCNC: 140 MMOL/L (ref 136–145)
WBC # BLD: 6 K/UL (ref 4–11)

## 2023-02-12 PROCEDURE — 6360000002 HC RX W HCPCS: Performed by: CLINICAL NURSE SPECIALIST

## 2023-02-12 PROCEDURE — 6360000002 HC RX W HCPCS: Performed by: INTERNAL MEDICINE

## 2023-02-12 PROCEDURE — 2580000003 HC RX 258: Performed by: CLINICAL NURSE SPECIALIST

## 2023-02-12 PROCEDURE — 82570 ASSAY OF URINE CREATININE: CPT

## 2023-02-12 PROCEDURE — 80048 BASIC METABOLIC PNL TOTAL CA: CPT

## 2023-02-12 PROCEDURE — 6370000000 HC RX 637 (ALT 250 FOR IP): Performed by: INTERNAL MEDICINE

## 2023-02-12 PROCEDURE — 99233 SBSQ HOSP IP/OBS HIGH 50: CPT | Performed by: INTERNAL MEDICINE

## 2023-02-12 PROCEDURE — 36415 COLL VENOUS BLD VENIPUNCTURE: CPT

## 2023-02-12 PROCEDURE — 2060000000 HC ICU INTERMEDIATE R&B

## 2023-02-12 PROCEDURE — 99232 SBSQ HOSP IP/OBS MODERATE 35: CPT | Performed by: NURSE PRACTITIONER

## 2023-02-12 PROCEDURE — 85027 COMPLETE CBC AUTOMATED: CPT

## 2023-02-12 PROCEDURE — 2580000003 HC RX 258: Performed by: INTERNAL MEDICINE

## 2023-02-12 PROCEDURE — 84156 ASSAY OF PROTEIN URINE: CPT

## 2023-02-12 RX ORDER — FUROSEMIDE 10 MG/ML
80 INJECTION INTRAMUSCULAR; INTRAVENOUS 2 TIMES DAILY
Status: DISCONTINUED | OUTPATIENT
Start: 2023-02-12 | End: 2023-02-12

## 2023-02-12 RX ORDER — FUROSEMIDE 10 MG/ML
80 INJECTION INTRAMUSCULAR; INTRAVENOUS 3 TIMES DAILY
Status: DISCONTINUED | OUTPATIENT
Start: 2023-02-12 | End: 2023-02-16

## 2023-02-12 RX ADMIN — INSULIN GLARGINE 30 UNITS: 100 INJECTION, SOLUTION SUBCUTANEOUS at 21:30

## 2023-02-12 RX ADMIN — Medication 10 ML: at 20:37

## 2023-02-12 RX ADMIN — CARVEDILOL 3.12 MG: 3.12 TABLET, FILM COATED ORAL at 07:43

## 2023-02-12 RX ADMIN — Medication 10 ML: at 10:43

## 2023-02-12 RX ADMIN — FUROSEMIDE 40 MG: 10 INJECTION, SOLUTION INTRAMUSCULAR; INTRAVENOUS at 10:42

## 2023-02-12 RX ADMIN — FUROSEMIDE 80 MG: 10 INJECTION, SOLUTION INTRAMUSCULAR; INTRAVENOUS at 20:37

## 2023-02-12 RX ADMIN — ENOXAPARIN SODIUM 30 MG: 100 INJECTION SUBCUTANEOUS at 20:37

## 2023-02-12 RX ADMIN — CARVEDILOL 3.12 MG: 3.12 TABLET, FILM COATED ORAL at 18:12

## 2023-02-12 RX ADMIN — TAMSULOSIN HYDROCHLORIDE 0.8 MG: 0.4 CAPSULE ORAL at 20:37

## 2023-02-12 RX ADMIN — INSULIN GLARGINE 30 UNITS: 100 INJECTION, SOLUTION SUBCUTANEOUS at 10:44

## 2023-02-12 RX ADMIN — IRON SUCROSE 200 MG: 20 INJECTION, SOLUTION INTRAVENOUS at 10:52

## 2023-02-12 RX ADMIN — ATORVASTATIN CALCIUM 40 MG: 40 TABLET, FILM COATED ORAL at 10:44

## 2023-02-12 RX ADMIN — ASPIRIN 325 MG: 325 TABLET ORAL at 10:43

## 2023-02-12 RX ADMIN — ENOXAPARIN SODIUM 30 MG: 100 INJECTION SUBCUTANEOUS at 10:43

## 2023-02-12 ASSESSMENT — PAIN SCALES - GENERAL: PAINLEVEL_OUTOF10: 0

## 2023-02-12 NOTE — PROGRESS NOTES
Nephrology  Note                                                                                                                                                                                                                                                                                                                                                               Office : [phone]     Fax :[phone]              Patient's Name: Celina Vance  8:25 AM  2/12/2023    Reason for Consult:  CKD  3   Requesting Physician:  No primary care provider on file. Good UO on lasix   Cr stable   SOB better      History reviewed. No pertinent past medical history. No past surgical history on file. No family history on file.          Allergies:  Latex    Current Medications:    iron sucrose (VENOFER) 200 mg in sodium chloride 0.9 % 100 mL IVPB, Q24H  sodium chloride flush 0.9 % injection 10 mL, 2 times per day  sodium chloride flush 0.9 % injection 10 mL, PRN  0.9 % sodium chloride infusion, PRN  ondansetron (ZOFRAN-ODT) disintegrating tablet 4 mg, Q8H PRN   Or  ondansetron (ZOFRAN) injection 4 mg, Q6H PRN  senna (SENOKOT) tablet 8.6 mg, Daily PRN  acetaminophen (TYLENOL) tablet 650 mg, Q6H PRN   Or  acetaminophen (TYLENOL) suppository 650 mg, Q6H PRN  enoxaparin Sodium (LOVENOX) injection 30 mg, BID  potassium chloride (KLOR-CON M) extended release tablet 40 mEq, PRN   Or  potassium bicarb-citric acid (EFFER-K) effervescent tablet 40 mEq, PRN   Or  potassium chloride 10 mEq/100 mL IVPB (Peripheral Line), PRN  magnesium sulfate 2000 mg in 50 mL IVPB premix, PRN  furosemide (LASIX) injection 40 mg, BID  aspirin tablet 325 mg, Daily  atorvastatin (LIPITOR) tablet 40 mg, Daily  tamsulosin (FLOMAX) capsule 0.8 mg, Nightly  insulin lispro (HUMALOG) injection vial 0-4 Units, TID WC  insulin lispro (HUMALOG) injection vial 0-4 Units, Nightly  dextrose bolus 10% 125 mL, PRN   Or  dextrose bolus 10% 250 mL, PRN  glucagon (rDNA) injection 1 mg, PRN  dextrose 10 % infusion, Continuous PRN  insulin glargine (LANTUS) injection vial 30 Units, BID  perflutren lipid microspheres (DEFINITY) injection 1.5 mL, ONCE PRN  carvedilol (COREG) tablet 3.125 mg, BID WC      Review of Systems:   14 point ROS obtained but were negative except mentioned in HPI      Physical exam:     Vitals:  BP (!) 127/50   Pulse 77   Temp 97.5 °F (36.4 °C) (Oral)   Resp 19   Ht 6' 1\" (1.854 m)   Wt 297 lb 9.6 oz (135 kg)   SpO2 98%   BMI 39.26 kg/m²   Constitutional:  OAA X3 NAD  Skin: no rash, turgor wnl  Heent:  eomi, mmm  Neck: no bruits or jvd noted  Cardiovascular:  S1, S2 without m/r/g  Respiratory: CTA B without w/r/r  Abdomen:  +bs, soft, nt, nd  Ext: + lower extremity edema  Psychiatric: mood and affect appropriate  Musculoskeletal:  Rom, muscular strength intact    Data:   Labs:  CBC:   Recent Labs     02/12/23  0535   WBC 6.0   HGB 11.2*          BMP:    Recent Labs     02/10/23  0450 02/11/23  0527 02/12/23  0535    141 140   K 4.1 3.8 3.7    105 104   CO2 26 28 26   BUN 54* 58* 59*   CREATININE 1.8* 1.8* 1.9*   GLUCOSE 162* 96 96       Ca/Mg/Phos:   Recent Labs     02/10/23  0450 02/11/23  0527 02/12/23  0535   CALCIUM 8.7 8.6 8.7   MG 2.40  --   --        Hepatic:   No results for input(s): AST, ALT, ALB, BILITOT, ALKPHOS in the last 72 hours. Troponin:   No results for input(s): TROPONINI in the last 72 hours. BNP: No results for input(s): BNP in the last 72 hours. Lipids:   No results for input(s): CHOL, TRIG, HDL, LDLCALC, LABVLDL in the last 72 hours. ABGs: No results for input(s): PHART, PO2ART, YQQ2CUO in the last 72 hours. INR:   No results for input(s): INR in the last 72 hours. UA:No results for input(s): Maricruz Lawton, GLUCOSEU, BILIRUBINUR, Nanetta Pa, BLOODU, PHUR, PROTEINU, UROBILINOGEN, NITRU, LEUKOCYTESUR, LABMICR, URINETYPE in the last 72 hours.    Urine Microscopic: No results for input(s): LABCAST, BACTERIA, COMU, HYALCAST, WBCUA, RBCUA, EPIU in the last 72 hours. Urine Culture: No results for input(s): LABURIN in the last 72 hours. Urine Chemistry:   Recent Labs     02/11/23  0535   LABCREA 88.7   PROTEINUR 9.00             IMAGING:  XR CHEST PORTABLE   Final Result   Mild cardiomegaly with findings of pulmonary edema. Assessment/Plan   CKD 3     2. HTN    3. Anemia    4. Acid- base/ Electrolyte imbalance     5.  CHF     Plan   - IV diuresis   - Ur studies  - BNP  - 8 k   - monitor UO and renal function   - labs in am       Recommend to dose adjust all medications  based on renal functions  Maintain SBP> 90 mmHg   Daily weights   AVOID NSAIDs  Avoid Nephrotoxins  Monitor Intake/Output  Call if significant decrease in urine output                   Thank you for allowing us to participate in care of Wynelle Sacks, MD  Feel free to contact me   [facility] [address]  Office : [phone]  Fax :[phone]

## 2023-02-12 NOTE — PROGRESS NOTES
[facility]              Progress Note      Admit Date 2023  HPI:   Mr. Lima is a 77 year old male with history of CAD/CABG 20 years ago at  with Dr. Walsh.  He has not been following any cardiologist for 10 years, CKD stage III  He is admitted with confusion, leg swelling, shortness of breath and hypoxia from [facility].  He saw PCP had edema and orthopnea, started lasix without any improvement.  He was on actos which has been stopped.  Cxr with mild pulmonary edema         Interval history:  Creatinine 1.8 > 1.9     BUN trending up : 52 > 54 > 58 > 59     proBNP : 7200 > 7442 > 5672     ~neg balance : neg 6.8 L     ~lasix 40 mg IV bid    Mr. Lima denies chest pain,  palpitations  Subjective: up in chair. He denies SOB / orthopnea. Slept poorly d/t IV coming out.  Leg swelling better       Scheduled Meds:   iron sucrose (VENOFER) iv piggyback 100 mL  200 mg IntraVENous Q24H    sodium chloride flush  10 mL IntraVENous 2 times per day    enoxaparin  30 mg SubCUTAneous BID    furosemide  40 mg IntraVENous BID    aspirin  325 mg Oral Daily    atorvastatin  40 mg Oral Daily    tamsulosin  0.8 mg Oral Nightly    insulin lispro  0-4 Units SubCUTAneous TID WC    insulin lispro  0-4 Units SubCUTAneous Nightly    insulin glargine  30 Units SubCUTAneous BID    carvedilol  3.125 mg Oral BID WC     Continuous Infusions:   sodium chloride      dextrose       PRN Meds:sodium chloride flush, sodium chloride, ondansetron **OR** ondansetron, senna, acetaminophen **OR** acetaminophen, potassium chloride **OR** potassium alternative oral replacement **OR** potassium chloride, magnesium sulfate, dextrose bolus **OR** dextrose bolus, glucagon (rDNA), dextrose, perflutren lipid microspheres       Objective:     Wt Readings from Last 3 Encounters:   23 298 lb (135.2 kg)   Admit weight: Weight: (!) 308 lb 6.4 oz (139.9 kg)      Temperature range over 24hrs:   Temp  Av.8 °F (36.6 °C)  Min: 97.5 °F  (36.4 °C)  Max: 98.2 °F (36.8 °C)  Current Respiratory Rate:  Resp: 19  Current Pulse:  Heart Rate: 77  Current Blood Pressure:  BP: (!) 127/50  24hr Blood Pressure Range:  Systolic (87LFR), SSC:541 , Min:109 , QPR:770   ; Diastolic (53BWP), LUISITO:75, Min:50, Max:76    Current Pulse Oximetry:  SpO2: 98 % RA      Intake/Output Summary (Last 24 hours) at 2/12/2023 7932  Last data filed at 2/12/2023 0600  Gross per 24 hour   Intake 1200 ml   Output 1925 ml   Net -725 ml       Telemetry monitor: sinus rhythm    Physical Exam:  General:  Awake, alert, NAD  Psych : calm  Skin:  Warm and dry  Chest:  Clear - diminished to auscultation, respiration easy  Cardiovascular:  RRR S1S2 no murmur to auscultation; no JVD  Abdomen: Bowel sounds normal, abd soft, non-tender  Extremities:  sonia LE edema: Lt pitting > Rt firm, + calf tenderness  : voiding clear yellow      Imaging    Echo: 2/9/23:  Summary   Left ventricular size is moderately increased. Left ventricular systolic function is severely depressed with ejection   fraction estimated at 25-30%. Global hypokinesis with hypokinesis more pronounced in the inferior and   septal walls. There is mild concentric left ventricular hypertrophy. Grade II diastolic dysfunction with elevated LV filling pressures. Mild to moderate mitral regurgitation. The left atrium is mildly dilated. Moderate tricuspid regurgitation. Systolic pulmonary artery pressure (SPAP) is normal and estimated at 30 mmHg   (right atrial pressure 8 mmHg).      Lab Review     Renal Profile:   Lab Results   Component Value Date/Time    CREATININE 1.9 02/12/2023 05:35 AM    BUN 59 02/12/2023 05:35 AM     02/12/2023 05:35 AM    K 3.7 02/12/2023 05:35 AM     02/12/2023 05:35 AM    CO2 26 02/12/2023 05:35 AM     CBC:    Lab Results   Component Value Date/Time    WBC 6.0 02/12/2023 05:35 AM    RBC 3.76 02/12/2023 05:35 AM    HGB 11.2 02/12/2023 05:35 AM    HCT 34.5 02/12/2023 05:35 AM    MCV 91.6 02/12/2023 05:35 AM    RDW 13.9 02/12/2023 05:35 AM     02/12/2023 05:35 AM     BNP:  No results found for: BNP  Fasting Lipid Panel:    Lab Results   Component Value Date/Time    CHOL 102 02/08/2023 06:03 AM    HDL 43 02/08/2023 06:03 AM    TRIG 69 02/08/2023 06:03 AM     Cardiac Enzymes:    Lab Results   Component Value Date/Time    TROPONINI 0.46 02/08/2023 06:03 AM     PT/ INR   Lab Results   Component Value Date/Time    INR 1.16 02/08/2023 06:03 AM    PROTIME 14.8 02/08/2023 06:03 AM     PTT No results found for: PTT   Lab Results   Component Value Date/Time    MG 2.40 02/10/2023 04:50 AM      Lab Results   Component Value Date/Time    TSH 5.90 02/08/2023 06:03 AM       Assessment/Plan:     Patient Active Problem List   Diagnosis    Acute heart failure (HCC)    Coronary artery disease due to calcified coronary lesion    Stage 3 chronic kidney disease (HCC)    Type 2 diabetes mellitus with stage 3 chronic kidney disease, without long-term current use of insulin (HCC)    Edema    Hyperlipidemia    Iron deficiency anemia    Chronic systolic (congestive) heart failure (HCC)    Electrolyte imbalance        Assessment:     1. Acute systolic heart failure   ~LVEF 25-30% ; HK more pronounced in the inferior and septal walls. Mitral regurg noted   ~lasix 40 mg IV bid ; BUN trending up; creatinine 1.8 > 1.9; urine protein 9  2. CAD, status post CABG 20 years ago   ~troponin 0.46   ~denies angina   ~HK inferior / septal walls with EF 25-30%   ~coreg 3.125 mg bid / ASA / statin  3. Hyperlipidemia   ~controlled on atorvastatin   4. Iron deficient anemia : receiving venofer   ~Hgb 11.2 > 11.5        Plan:     Continue IV lasix 40 mg twice a day   Consider adding jardiance for HF and renal insufficiency once he is diuresed >> BUN trending up ; GFR 36  Continue coreg 3.125 mg twice a day. He will need to have interventional to see next week due to drop in LVEF    The patient was seen for >35 minutes.  I reviewed interval history, physical exam, review of data including labs, imaging, development and implementation of treatment plan and coordination of complex care

## 2023-02-12 NOTE — PROGRESS NOTES
[facility] PROGRESS NOTE    2/12/2023 10:10 AM        Name: Khari Washburn . Admitted: 2/8/2023  Primary Care Provider: No primary care provider on file. (Tel: None)      Brief History: 68 yo male with hx CAD/CABG, HTN, HLD, DM2, CKD stage III. He presented to [facility] with chest pressure, shortness of breath, edema. He was recently started on Lasix by PCP with no improvement. Troponin was noted to be elevated (0.45). He was transferred to [facility] for NSTEMI and acute CHF. Subjective:  Up in chair dozing, awakens easily. Says he did not get much sleep last night because IV came out. Says he was able to recline nearly flat in bed for several hours. Denies shortness of breath, chest pain, palpitations, lightheadedness. Weight down 10 lbs from admission. Reports swelling improved.      Reviewed interval ancillary notes    Current Medications  iron sucrose (VENOFER) 200 mg in sodium chloride 0.9 % 100 mL IVPB, Q24H  sodium chloride flush 0.9 % injection 10 mL, 2 times per day  sodium chloride flush 0.9 % injection 10 mL, PRN  0.9 % sodium chloride infusion, PRN  ondansetron (ZOFRAN-ODT) disintegrating tablet 4 mg, Q8H PRN   Or  ondansetron (ZOFRAN) injection 4 mg, Q6H PRN  senna (SENOKOT) tablet 8.6 mg, Daily PRN  acetaminophen (TYLENOL) tablet 650 mg, Q6H PRN   Or  acetaminophen (TYLENOL) suppository 650 mg, Q6H PRN  enoxaparin Sodium (LOVENOX) injection 30 mg, BID  potassium chloride (KLOR-CON M) extended release tablet 40 mEq, PRN   Or  potassium bicarb-citric acid (EFFER-K) effervescent tablet 40 mEq, PRN   Or  potassium chloride 10 mEq/100 mL IVPB (Peripheral Line), PRN  magnesium sulfate 2000 mg in 50 mL IVPB premix, PRN  furosemide (LASIX) injection 40 mg, BID  aspirin tablet 325 mg, Daily  atorvastatin (LIPITOR) tablet 40 mg, Daily  tamsulosin (FLOMAX) capsule 0.8 mg, Nightly  insulin lispro (HUMALOG) injection vial 0-4 Units, TID WC  insulin lispro (HUMALOG) injection vial 0-4 Units, Nightly  dextrose bolus 10% 125 mL, PRN   Or  dextrose bolus 10% 250 mL, PRN  glucagon (rDNA) injection 1 mg, PRN  dextrose 10 % infusion, Continuous PRN  insulin glargine (LANTUS) injection vial 30 Units, BID  perflutren lipid microspheres (DEFINITY) injection 1.5 mL, ONCE PRN  carvedilol (COREG) tablet 3.125 mg, BID       Objective:  BP (!) 127/50   Pulse 77   Temp 97.5 °F (36.4 °C) (Oral)   Resp 19   Ht 6' 1\" (1.854 m)   Wt 298 lb (135.2 kg)   SpO2 98%   BMI 39.32 kg/m²     Intake/Output Summary (Last 24 hours) at 2/12/2023 1010  Last data filed at 2/12/2023 0600  Gross per 24 hour   Intake 1200 ml   Output 1675 ml   Net -475 ml      Wt Readings from Last 3 Encounters:   02/12/23 298 lb (135.2 kg)     General:  Awake, alert, oriented in NAD  Skin:  Warm and dry. No unusual bruising or rash  Neck:  Supple. No JVD appreciated  Chest:  Normal effort. Clear to auscultation, no wheezes/rhonchi/rales  Cardiovascular:  RRR, normal S1/S2, no murmur/gallop/rub  Abdomen:  Soft, nontender, +bowel sounds  Extremities:  + BLE edema improving  Neurological: No focal deficits  Psychological: Normal mood and affect      Labs and Tests:  CBC:   Recent Labs     02/12/23  0535   WBC 6.0   HGB 11.2*        BMP:    Recent Labs     02/10/23  0450 02/11/23  0527 02/12/23  0535    141 140   K 4.1 3.8 3.7    105 104   CO2 26 28 26   BUN 54* 58* 59*   CREATININE 1.8* 1.8* 1.9*   GLUCOSE 162* 96 96     Hepatic:   No results for input(s): AST, ALT, ALB, BILITOT, ALKPHOS in the last 72 hours. CXR 2/8/2023:  Mild cardiomegaly with findings of pulmonary edema. Echo 2/9/2023:  Summary   Left ventricular size is moderately increased. Left ventricular systolic function is severely depressed with ejection   fraction estimated at 25-30%.    Global hypokinesis with hypokinesis more pronounced in the inferior and septal walls.   There is mild concentric left ventricular hypertrophy. Grade II diastolic dysfunction with elevated LV filling pressures. Mild to moderate mitral regurgitation. The left atrium is mildly dilated. Moderate tricuspid regurgitation. Systolic pulmonary artery pressure (SPAP) is normal and estimated at 30 mmHg   (right atrial pressure 8 mmHg). Problem List  Principal Problem:    Acute heart failure (HCC)  Active Problems:    Coronary artery disease due to calcified coronary lesion    Stage 3 chronic kidney disease (HCC)    Type 2 diabetes mellitus with stage 3 chronic kidney disease, without long-term current use of insulin (HCC)    Edema    Hyperlipidemia    Iron deficiency anemia    Chronic systolic (congestive) heart failure (HCC)    Electrolyte imbalance  Resolved Problems:    * No resolved hospital problems. *       Assessment & Plan:   Acute s/dCHF. Presents with edema and worsening dyspnea. CXR with pulmonary edema, proBNP 7200. Echo reveals EF 25-30%, grade II diastolic dysfunction. Started on IV Lasix with good response, weight is down 11 lbs, creatinine stable. Remains volume overloaded, continue to diurese. Appreciate cardiology and nephrology recs. New onset cardiomyopathy. EF 25-30% on echo with global hypokinesis more pronounced in inferior and septal walls. Continue carvedilol. Hold ACE/ARB/ARNI with diuresis. Interventional cardiology to see Monday. CKD stage III. Creatinine 1.6 on presentation, unclear of baseline. Trending up, 1.9 today. Continue diuresis. Appreciate nephrology recs. DM2. A1c 6.8. Takes Levemir, Actos and nateglinide at home. Being covered with Lantus and low dose correction. Reviewed BG values  today. Continue current regimen. Recommend not resuming Actos on DC as it can worsen peripheral edema. HTN. Controlled. Continue amlodipine, carvedilol. Continue to follow. Elevated troponin / CAD. Troponin 0.46 in setting CHF and CKD.  Hx CABG ~ 20 years ago. No recent testing. Denies chest pain. Echo with EF 25-30% with global hypokinesis more pronounced in inferior and septal walls. Interventional cardiology to see. Continue asa, statin, beta blocker  HLD. , LDL 45. Continue statin. Normocytic anemia. Hgb 11.5, iron level low. Never had colonoscopy. Stool occult blood negative. Likely component of CKD. Receiving IV Venofer. Recommend GI follow up as outpatient for screening colonoscopy. Disposition: Anticipate home when medically stable. PT/OT consults pending. Diet: ADULT DIET; Regular;  No Added Salt (3-4 gm); 2000 ml  Code:Full Code  DVT PPX: enoxaparin      Kellen Young, ZION - CNP   2/12/2023 10:10 AM

## 2023-02-12 NOTE — PLAN OF CARE
Pt alert and oriented x4. Vital signs stable. Sinus Arrhythmia on telemetry. Medications given per MAR. PIV leaking. IV therapy notified for PIV placement after attempts made by 2 RN's. BLE painful to touch, red, with blisters and dressings placed. Plan of care discussed with patient. Pt denies any questions or needs at this time.    Problem: Safety - Adult  Goal: Free from fall injury  Outcome: Progressing     Problem: ABCDS Injury Assessment  Goal: Absence of physical injury  Outcome: Progressing     Problem: Respiratory - Adult  Goal: Achieves optimal ventilation and oxygenation  Outcome: Progressing     Problem: Cardiovascular - Adult  Goal: Maintains optimal cardiac output and hemodynamic stability  Outcome: Progressing     Problem: Musculoskeletal - Adult  Goal: Return mobility to safest level of function  Outcome: Progressing     Problem: Gastrointestinal - Adult  Goal: Maintains or returns to baseline bowel function  Outcome: Progressing

## 2023-02-13 PROBLEM — I10 PRIMARY HYPERTENSION: Status: ACTIVE | Noted: 2023-02-13

## 2023-02-13 PROBLEM — I42.9 CARDIOMYOPATHY (HCC): Status: ACTIVE | Noted: 2023-02-13

## 2023-02-13 LAB
ANION GAP SERPL CALCULATED.3IONS-SCNC: 6 MMOL/L (ref 3–16)
BUN BLDV-MCNC: 56 MG/DL (ref 7–20)
CALCIUM SERPL-MCNC: 8.6 MG/DL (ref 8.3–10.6)
CHLORIDE BLD-SCNC: 100 MMOL/L (ref 99–110)
CO2: 29 MMOL/L (ref 21–32)
CREAT SERPL-MCNC: 1.8 MG/DL (ref 0.8–1.3)
GFR SERPL CREATININE-BSD FRML MDRD: 38 ML/MIN/{1.73_M2}
GLUCOSE BLD-MCNC: 114 MG/DL (ref 70–99)
GLUCOSE BLD-MCNC: 134 MG/DL (ref 70–99)
GLUCOSE BLD-MCNC: 182 MG/DL (ref 70–99)
GLUCOSE BLD-MCNC: 196 MG/DL (ref 70–99)
GLUCOSE BLD-MCNC: 203 MG/DL (ref 70–99)
GLUCOSE BLD-MCNC: 224 MG/DL (ref 70–99)
GLUCOSE BLD-MCNC: 56 MG/DL (ref 70–99)
PERFORMED ON: ABNORMAL
POTASSIUM SERPL-SCNC: 3.8 MMOL/L (ref 3.5–5.1)
PRO-BNP: 4489 PG/ML (ref 0–449)
SODIUM BLD-SCNC: 135 MMOL/L (ref 136–145)

## 2023-02-13 PROCEDURE — 80048 BASIC METABOLIC PNL TOTAL CA: CPT

## 2023-02-13 PROCEDURE — 6360000002 HC RX W HCPCS: Performed by: INTERNAL MEDICINE

## 2023-02-13 PROCEDURE — 6370000000 HC RX 637 (ALT 250 FOR IP): Performed by: NURSE PRACTITIONER

## 2023-02-13 PROCEDURE — 99233 SBSQ HOSP IP/OBS HIGH 50: CPT | Performed by: INTERNAL MEDICINE

## 2023-02-13 PROCEDURE — 83880 ASSAY OF NATRIURETIC PEPTIDE: CPT

## 2023-02-13 PROCEDURE — 6370000000 HC RX 637 (ALT 250 FOR IP): Performed by: INTERNAL MEDICINE

## 2023-02-13 PROCEDURE — 2580000003 HC RX 258: Performed by: CLINICAL NURSE SPECIALIST

## 2023-02-13 PROCEDURE — 99232 SBSQ HOSP IP/OBS MODERATE 35: CPT | Performed by: NURSE PRACTITIONER

## 2023-02-13 PROCEDURE — 6360000002 HC RX W HCPCS: Performed by: CLINICAL NURSE SPECIALIST

## 2023-02-13 PROCEDURE — 2060000000 HC ICU INTERMEDIATE R&B

## 2023-02-13 PROCEDURE — 36415 COLL VENOUS BLD VENIPUNCTURE: CPT

## 2023-02-13 PROCEDURE — 2580000003 HC RX 258: Performed by: INTERNAL MEDICINE

## 2023-02-13 RX ORDER — INSULIN GLARGINE 100 [IU]/ML
25 INJECTION, SOLUTION SUBCUTANEOUS 2 TIMES DAILY
Status: DISCONTINUED | OUTPATIENT
Start: 2023-02-13 | End: 2023-02-14

## 2023-02-13 RX ADMIN — TAMSULOSIN HYDROCHLORIDE 0.8 MG: 0.4 CAPSULE ORAL at 20:30

## 2023-02-13 RX ADMIN — IRON SUCROSE 200 MG: 20 INJECTION, SOLUTION INTRAVENOUS at 11:52

## 2023-02-13 RX ADMIN — FUROSEMIDE 80 MG: 10 INJECTION, SOLUTION INTRAMUSCULAR; INTRAVENOUS at 20:30

## 2023-02-13 RX ADMIN — CARVEDILOL 3.12 MG: 3.12 TABLET, FILM COATED ORAL at 19:21

## 2023-02-13 RX ADMIN — ENOXAPARIN SODIUM 30 MG: 100 INJECTION SUBCUTANEOUS at 20:30

## 2023-02-13 RX ADMIN — ENOXAPARIN SODIUM 30 MG: 100 INJECTION SUBCUTANEOUS at 08:46

## 2023-02-13 RX ADMIN — FUROSEMIDE 80 MG: 10 INJECTION, SOLUTION INTRAMUSCULAR; INTRAVENOUS at 08:51

## 2023-02-13 RX ADMIN — INSULIN LISPRO 1 UNITS: 100 INJECTION, SOLUTION INTRAVENOUS; SUBCUTANEOUS at 12:10

## 2023-02-13 RX ADMIN — CARVEDILOL 3.12 MG: 3.12 TABLET, FILM COATED ORAL at 08:46

## 2023-02-13 RX ADMIN — Medication 10 ML: at 20:31

## 2023-02-13 RX ADMIN — INSULIN GLARGINE 30 UNITS: 100 INJECTION, SOLUTION SUBCUTANEOUS at 08:45

## 2023-02-13 RX ADMIN — INSULIN GLARGINE 25 UNITS: 100 INJECTION, SOLUTION SUBCUTANEOUS at 20:31

## 2023-02-13 RX ADMIN — ATORVASTATIN CALCIUM 40 MG: 40 TABLET, FILM COATED ORAL at 08:46

## 2023-02-13 RX ADMIN — ASPIRIN 325 MG: 325 TABLET ORAL at 08:46

## 2023-02-13 RX ADMIN — Medication 10 ML: at 08:46

## 2023-02-13 RX ADMIN — FUROSEMIDE 80 MG: 10 INJECTION, SOLUTION INTRAMUSCULAR; INTRAVENOUS at 16:09

## 2023-02-13 ASSESSMENT — PAIN SCALES - GENERAL
PAINLEVEL_OUTOF10: 0

## 2023-02-13 NOTE — PROGRESS NOTES
[address]  HEART FAILURE  Progress Note      Admit Date 2/8/2023     Reason for Consult:      Reason for Consultation/Chief Complaint: SOB    HPI:    Dequan Linag is a 69 y.o. male with PMH CAD,CABG, CKD, no cardio f/u in 10years admitted with confusion, SOB and edema. Echo shows low EF and IC to see re: ischemic eval      Subjective:  Patient is being seen for CHF. There were no acute overnight cardiac events. Today Mr. Ashli Renee denies chest pain today but says he did have some prior to admit. He tells me SOB is improving and denies orthopnea, palpitations, or dizziness  I spent 10 minutes educating the patient on heart failure, medications, lifestyle modifications and dietary guidelines. Review of Systems - History obtained from the patient  General ROS: negative  Respiratory ROS: no cough, shortness of breath, or wheezing  Cardiovascular ROS: no chest pain or dyspnea on exertion  Gastrointestinal ROS: no abdominal pain, change in bowel habits, or black or bloody stools  Musculoskeletal ROS: positive for - swelling  Neurological ROS: no TIA or stroke symptoms     Baseline Weight: [place] [facility]   Wt Readings from Last 3 Encounters:   02/13/23 295 lb 9.6 oz (134.1 kg)         Cardiac Testing:   Echo 2/9/2023  Summary  Left ventricular size is moderately increased. Left ventricular systolic function is severely depressed with ejection fraction estimated at 25-30%. Global hypokinesis with hypokinesis more pronounced in the inferior and septal walls. There is mild concentric left ventricular hypertrophy. Grade II diastolic dysfunction with elevated LV filling pressures. Mild to moderate mitral regurgitation. The left atrium is mildly dilated. Moderate tricuspid regurgitation. Systolic pulmonary artery pressure (SPAP) is normal and estimated at 30 mmHg (right atrial pressure 8 mmHg).     Device: none     NYHA Class III    Objective:   /70   Pulse 70   Temp 98.4 °F (36.9 °C) (Axillary)   Resp 18  6' 1\" (1.854 m)   Wt 295 lb 9.6 oz (134.1 kg)   SpO2 97%   BMI 39.00 kg/m²     Intake/Output Summary (Last 24 hours) at 2/13/2023 1102  Last data filed at 2/13/2023 0715  Gross per 24 hour   Intake 845 ml   Output 2800 ml   Net -1955 ml      In: 1085 [P.O.:1085]  Out: 2800     TELEMETRY: SR    Physical Exam:  General Appearance:  Non-obese/Well Nourished  Respiratory:  Resp Auscultation: Normal breath sounds without dullness  Cardiovascular:   Auscultation: Regular rate and rhythm, normal S1S2, no m/g/r/c  Palpation: Normal    Pedal Pulses: 2+ and equal   Abdomen:  Soft, NT, ND, + bs  Extremities:  No Cyanosis or Clubbing  Extremities: 1+ edema  Neurological/Psychiatric:  Oriented to time, place, and person  Non-anxious    Pertinent labs, diagnostic, device, and imaging results reviewed as a part of this visit    MEDICATIONS:   Scheduled Meds:   Scheduled Meds:   furosemide  80 mg IntraVENous TID    iron sucrose (VENOFER) iv piggyback 100 mL  200 mg IntraVENous Q24H    sodium chloride flush  10 mL IntraVENous 2 times per day    enoxaparin  30 mg SubCUTAneous BID    aspirin  325 mg Oral Daily    atorvastatin  40 mg Oral Daily    tamsulosin  0.8 mg Oral Nightly    insulin lispro  0-4 Units SubCUTAneous TID WC    insulin lispro  0-4 Units SubCUTAneous Nightly    insulin glargine  30 Units SubCUTAneous BID    carvedilol  3.125 mg Oral BID WC     Continuous Infusions:   sodium chloride      dextrose       PRN Meds:.sodium chloride flush, sodium chloride, ondansetron **OR** ondansetron, senna, acetaminophen **OR** acetaminophen, potassium chloride **OR** potassium alternative oral replacement **OR** potassium chloride, magnesium sulfate, dextrose bolus **OR** dextrose bolus, glucagon (rDNA), dextrose, perflutren lipid microspheres  Continuous Infusions:   sodium chloride      dextrose         Intake/Output Summary (Last 24 hours) at 2/13/2023 0926  Last data filed at 2/13/2023 0715  Gross per 24 hour   Intake 845 ml   Output 2800 ml   Net -1955 ml       Lab Data:  CBC:   Lab Results   Component Value Date/Time    WBC 6.0 02/12/2023 05:35 AM    HGB 11.2 02/12/2023 05:35 AM     02/12/2023 05:35 AM     BMP:  Lab Results   Component Value Date/Time     02/12/2023 05:35 AM    K 3.7 02/12/2023 05:35 AM     02/12/2023 05:35 AM    CO2 26 02/12/2023 05:35 AM    BUN 59 02/12/2023 05:35 AM    CREATININE 1.9 02/12/2023 05:35 AM    GLUCOSE 96 02/12/2023 05:35 AM     INR:   Lab Results   Component Value Date/Time    INR 1.16 02/08/2023 06:03 AM        CARDIAC LABS  ENZYMES:No results for input(s): CKMB, CKMBINDEX, TROPONINI in the last 72 hours. Invalid input(s): CKTOTAL;3  FASTING LIPID PANEL:  Lab Results   Component Value Date/Time    HDL 43 02/08/2023 06:03 AM    LDLCALC 45 02/08/2023 06:03 AM    TRIG 69 02/08/2023 06:03 AM    TSH 5.90 02/08/2023 06:03 AM     LIVER PROFILE:  Lab Results   Component Value Date/Time    AST 18 02/09/2023 05:46 AM    ALT 16 02/09/2023 05:46 AM     BNP:   Lab Results   Component Value Date/Time    PROBNP 5,672 02/10/2023 04:50 AM    PROBNP 7,442 02/09/2023 05:46 AM    PROBNP 7,200 02/08/2023 06:03 AM     Iron Studies:    Lab Results   Component Value Date/Time    FERRITIN 145.8 02/09/2023 05:45 AM     Lab Results   Component Value Date    IRON 43 (L) 02/09/2023    TIBC 260 02/09/2023    FERRITIN 145.8 02/09/2023      Iron Deficiency Anemia:  Yes IV Iron Therapy:  Yes  2017 ACC/AHA HF Guidelines:   intravenous iron replacement in patients with New York Heart Association (NYHA) class II and III HF and iron deficiency(ferritin <100 ng/ml or 100-300 ng/ml if transferrin saturation <20%), to improve functional status and QoL. 1. WEIGHT: Admit Weight: (!) 308 lb 6.4 oz (139.9 kg)      Today  Weight: 295 lb 9.6 oz (134.1 kg)   2.  I/O   Intake/Output Summary (Last 24 hours) at 2/13/2023 0926  Last data filed at 2/13/2023 0715  Gross per 24 hour   Intake 845 ml   Output 2800 ml   Net -1955 ml         Assessment/Plan:     Acute Heart Failure:  ~Decompensated, 8L out with IV lasix, dose increased yesterday per nephrology  ~ BNP 3010>9861, recheck today  ~Plan: diurese, replace iron, Monitor I&O's, Daily weights, Pt education    Cardiomyopathy: New  ~Echo:  EF: 25-30%   Core measures for HF:  ACEi/ARB/ARNI: add entresto after diuresis  BB: Carvedilol  Brian:  will add prior to discharge  Hydralazine/nitrates:  SGLT2i:  will add jardiance prior to d/c  ~Device: none  ~Plan: needs ischemic eval    HTN:   ~controlled     CAD:   ~denies CP today, drop in EF  ~Meds:   Statin lipitor  BB: Carvedilol  ASA  ~Plan:  IC for ischemic eval                       RAY on CKD   ~Cr on admit 1.6>1.8>1.9, Baseline uk  ~Daily labs; trend creatinine  ~Nephrology consulted, Recs: continue diuresis  GILDA   ~Venofer    Multiple medical conditions with risk of decompensation. All pertinent information and plan of care discussed with the cardiologist     All questions and concerns were addressed to the patient. Alternatives to my treatment were discussed. I have discussed the above stated plan with patient and the nurse. The patient verbalized understanding and agreed with the plan.     I appreciate the opportunity of cooperating in the care of this individual.    Kylee Madsen, APRN - CNP, ACNP, [address] 2/13/2023, 9:26 AM  Heart Failure  The [address], [address] [facility]  Ph: [phone]      Core Measures:   Discharge instructions:   LVEF documented:   ACEI for LV dysfunction:   Smoking Cessation:

## 2023-02-13 NOTE — PROGRESS NOTES
[facility] PROGRESS NOTE    2/13/2023 11:30 AM        Name: Laurita Law . Admitted: 2/8/2023  Primary Care Provider: No primary care provider on file. (Tel: None)      Brief History: 70 yo male with hx CAD/CABG, HTN, HLD, DM2, CKD stage III. He presented to [facility] with chest pressure, shortness of breath, edema. He was recently started on Lasix by PCP with no improvement. Troponin was noted to be elevated (0.45). He was transferred to [facility] for NSTEMI and acute CHF. Subjective:  Up in chair. Say he feels good. Denies chest pain, shortness of breath, palpitations, lightheadedness, dizziness. Swelling improved. Awaiting cardiac plans for ischemic evaluation, \"I hope I don't have to have surgery again but will if that's what's needed. \"    Reviewed interval ancillary notes    Current Medications  furosemide (LASIX) injection 80 mg, TID  iron sucrose (VENOFER) 200 mg in sodium chloride 0.9 % 100 mL IVPB, Q24H  sodium chloride flush 0.9 % injection 10 mL, 2 times per day  sodium chloride flush 0.9 % injection 10 mL, PRN  0.9 % sodium chloride infusion, PRN  ondansetron (ZOFRAN-ODT) disintegrating tablet 4 mg, Q8H PRN   Or  ondansetron (ZOFRAN) injection 4 mg, Q6H PRN  senna (SENOKOT) tablet 8.6 mg, Daily PRN  acetaminophen (TYLENOL) tablet 650 mg, Q6H PRN   Or  acetaminophen (TYLENOL) suppository 650 mg, Q6H PRN  enoxaparin Sodium (LOVENOX) injection 30 mg, BID  potassium chloride (KLOR-CON M) extended release tablet 40 mEq, PRN   Or  potassium bicarb-citric acid (EFFER-K) effervescent tablet 40 mEq, PRN   Or  potassium chloride 10 mEq/100 mL IVPB (Peripheral Line), PRN  magnesium sulfate 2000 mg in 50 mL IVPB premix, PRN  aspirin tablet 325 mg, Daily  atorvastatin (LIPITOR) tablet 40 mg, Daily  tamsulosin (FLOMAX) capsule 0.8 mg, Nightly  insulin lispro (HUMALOG) injection vial 0-4 Units, TID WC  insulin lispro (HUMALOG) injection vial 0-4 Units, Nightly  dextrose bolus 10% 125 mL, PRN   Or  dextrose bolus 10% 250 mL, PRN  glucagon (rDNA) injection 1 mg, PRN  dextrose 10 % infusion, Continuous PRN  insulin glargine (LANTUS) injection vial 30 Units, BID  perflutren lipid microspheres (DEFINITY) injection 1.5 mL, ONCE PRN  carvedilol (COREG) tablet 3.125 mg, BID       Objective:  /70   Pulse 70   Temp 98.4 °F (36.9 °C) (Axillary)   Resp 18   Ht 6' 1\" (1.854 m)   Wt 295 lb 9.6 oz (134.1 kg)   SpO2 97%   BMI 39.00 kg/m²     Intake/Output Summary (Last 24 hours) at 2/13/2023 1130  Last data filed at 2/13/2023 0715  Gross per 24 hour   Intake 845 ml   Output 2600 ml   Net -1755 ml      Wt Readings from Last 3 Encounters:   02/13/23 295 lb 9.6 oz (134.1 kg)     General:  Awake, alert, oriented in NAD  Skin:  Warm and dry. No unusual bruising or rash  Neck:  Supple. No JVD appreciated  Chest:  Normal effort. Clear to auscultation, no wheezes/rhonchi/rales  Cardiovascular:  RRR, normal S1/S2, no murmur/gallop/rub  Abdomen:  Soft, nontender, +bowel sounds  Extremities:  + BLE edema  Neurological: No focal deficits  Psychological: Normal mood and affect      Labs and Tests:  CBC:   Recent Labs     02/12/23  0535   WBC 6.0   HGB 11.2*        BMP:    Recent Labs     02/11/23  0527 02/12/23  0535 02/13/23  0949    140 135*   K 3.8 3.7 3.8    104 100   CO2 28 26 29   BUN 58* 59* 56*   CREATININE 1.8* 1.9* 1.8*   GLUCOSE 96 96 196*     Hepatic:   No results for input(s): AST, ALT, ALB, BILITOT, ALKPHOS in the last 72 hours. CXR 2/8/2023:  Mild cardiomegaly with findings of pulmonary edema. Echo 2/9/2023:  Summary   Left ventricular size is moderately increased. Left ventricular systolic function is severely depressed with ejection   fraction estimated at 25-30%. Global hypokinesis with hypokinesis more pronounced in the inferior and septal walls.    There is mild concentric left ventricular hypertrophy. Grade II diastolic dysfunction with elevated LV filling pressures. Mild to moderate mitral regurgitation. The left atrium is mildly dilated. Moderate tricuspid regurgitation. Systolic pulmonary artery pressure (SPAP) is normal and estimated at 30 mmHg (right atrial pressure 8 mmHg). Problem List  Principal Problem:    Acute heart failure (HCC)  Active Problems:    Coronary artery disease due to calcified coronary lesion    Stage 3 chronic kidney disease (HCC)    Type 2 diabetes mellitus with stage 3 chronic kidney disease, without long-term current use of insulin (HCC)    Edema    Hyperlipidemia    Iron deficiency anemia    Chronic systolic (congestive) heart failure (HCC)    Electrolyte imbalance  Resolved Problems:    * No resolved hospital problems. *       Assessment & Plan:   Acute s/dCHF. Presents with edema and worsening dyspnea. CXR with pulmonary edema, proBNP 7200. Echo reveals EF 25-30%, grade II diastolic dysfunction. Started on IV Lasix with good response, weight is down 13 lbs. Remains volume overloaded and Lasix increased to TID per nephrology 2/12, creatinine remains stable. Appreciate cardiology and nephrology recs. New onset cardiomyopathy. Known CAD with CABG 32 years ago. Echo this admission with EF 25-30% with global hypokinesis more pronounced in inferior and septal walls. Will need ischemic evaluation. Continue carvedilol. Hold ACE/ARB/ARNI with diuresis. CKD stage III. Creatinine 1.6 on presentation, unclear of baseline, stabilized at 1.8-1.9. Continue to follow closely with diuresis. Appreciate nephrology recs. DM2. A1c 6.8. Takes Levemir, Actos and nateglinide at home. Being covered with Lantus and low dose correction. Reviewed BG values, POCT glucose 56 this am. Decrease Lantus to 25 units BID, continue to follow closely. Recommend not resuming Actos on DC as it can worsen peripheral edema. HTN. Controlled.  Continue amlodipine, carvedilol. Continue to follow. Elevated troponin / CAD. Troponin 0.46 in setting CHF and CKD. Hx CABG ~ 20 years ago. No recent testing. Denies chest pain. Echo with EF 25-30% with global hypokinesis more pronounced in inferior and septal walls. Will need ischemic evaluation. Continue asa, statin, beta blocker. HLD. , LDL 45. Continue statin. Normocytic anemia. Hgb 11.5, iron level low. Never had colonoscopy. Stool occult blood negative. Likely component of CKD. Receiving IV Venofer x 5 Recommend GI follow up as outpatient for screening colonoscopy. Disposition: Anticipate home when medically stable. PT/OT evaluated (2/8) and no needs on Dc. Diet: ADULT DIET; Regular;  No Added Salt (3-4 gm); 2000 ml  Code:Full Code  DVT PPX: enoxaparin      ZION Flores - CNP   2/13/2023 11:30 AM

## 2023-02-13 NOTE — PROGRESS NOTES
Physical/Occupational Therapy Note  Zia Koo    PT/OT orders received. Chart reviewed and pt noted to have been evaluated and discharged on 2/8/23 as pt was functioning independently at that time for household distances. No new medical events noted and no overall change in pt status. Discussed with patient, who has no new concerns and has been up in room without issue. Will discharge orders. RN updated.    Rosita Pacheco, PT, DPT #[id number]  Catina Kena, OTR/L -- [id number]

## 2023-02-13 NOTE — PROGRESS NOTES
Patient had breakfast and had the gram crackers and peanut butter and his blood sugar was 124. Patient is up and his chair and said he feels better and his vision has been cleared.

## 2023-02-13 NOTE — PLAN OF CARE
Pt alert and oriented x4. Sinus arrhthymias on telemetry. Vital signs stable. Respirations even and unlabored. Pt complaining of right heel pain. Right heel is red and swollen on assessment. Pt has legs up while sleeping. He denies any needs. Call light within reach.    Problem: Safety - Adult  Goal: Free from fall injury  Outcome: Progressing     Problem: ABCDS Injury Assessment  Goal: Absence of physical injury  Outcome: Progressing     Problem: Respiratory - Adult  Goal: Achieves optimal ventilation and oxygenation  Outcome: Progressing     Problem: Cardiovascular - Adult  Goal: Maintains optimal cardiac output and hemodynamic stability  Outcome: Progressing     Problem: Skin/Tissue Integrity - Adult  Goal: Skin integrity remains intact  Outcome: Progressing

## 2023-02-13 NOTE — PROGRESS NOTES
Nephrology  Note                                                                                                                                                                                                                                                                                                                                                               Office : [phone]     Fax :[phone]              Patient's Name: Derick Boykin  11:06 AM  2/13/2023    Reason for Consult:  CKD  3   Requesting Physician:  No primary care provider on file.         Good UO on lasix   Cr stable   SOB better             Allergies:  Latex    Current Medications:    furosemide (LASIX) injection 80 mg, TID  iron sucrose (VENOFER) 200 mg in sodium chloride 0.9 % 100 mL IVPB, Q24H  sodium chloride flush 0.9 % injection 10 mL, 2 times per day  sodium chloride flush 0.9 % injection 10 mL, PRN  0.9 % sodium chloride infusion, PRN  ondansetron (ZOFRAN-ODT) disintegrating tablet 4 mg, Q8H PRN   Or  ondansetron (ZOFRAN) injection 4 mg, Q6H PRN  senna (SENOKOT) tablet 8.6 mg, Daily PRN  acetaminophen (TYLENOL) tablet 650 mg, Q6H PRN   Or  acetaminophen (TYLENOL) suppository 650 mg, Q6H PRN  enoxaparin Sodium (LOVENOX) injection 30 mg, BID  potassium chloride (KLOR-CON M) extended release tablet 40 mEq, PRN   Or  potassium bicarb-citric acid (EFFER-K) effervescent tablet 40 mEq, PRN   Or  potassium chloride 10 mEq/100 mL IVPB (Peripheral Line), PRN  magnesium sulfate 2000 mg in 50 mL IVPB premix, PRN  aspirin tablet 325 mg, Daily  atorvastatin (LIPITOR) tablet 40 mg, Daily  tamsulosin (FLOMAX) capsule 0.8 mg, Nightly  insulin lispro (HUMALOG) injection vial 0-4 Units, TID WC  insulin lispro (HUMALOG) injection vial 0-4 Units, Nightly  dextrose bolus 10% 125 mL, PRN   Or  dextrose bolus 10% 250 mL, PRN  glucagon (rDNA) injection 1 mg, PRN  dextrose 10 % infusion, Continuous PRN  insulin glargine (LANTUS) injection vial 30 Units, BID  perflutren lipid microspheres (DEFINITY) injection 1.5 mL, ONCE PRN  carvedilol (COREG) tablet 3.125 mg, BID WC      Review of Systems:   14 point ROS obtained but were negative except mentioned in HPI      Physical exam:     Vitals:  /70   Pulse 70   Temp 98.4 °F (36.9 °C) (Axillary)   Resp 18   Ht 6' 1\" (1.854 m)   Wt 295 lb 9.6 oz (134.1 kg)   SpO2 97%   BMI 39.00 kg/m²   Constitutional:  OAA X3 NAD  Skin: no rash, turgor wnl  Heent:  eomi, mmm  Neck: no bruits or jvd noted  Cardiovascular:  S1, S2 without m/r/g  Respiratory: CTA B without w/r/r  Abdomen:  +bs, soft, nt, nd  Ext: + lower extremity edema  Psychiatric: mood and affect appropriate  Musculoskeletal:  Rom, muscular strength intact    Data:   Labs:  CBC:   Recent Labs     02/12/23 0535   WBC 6.0   HGB 11.2*        BMP:    Recent Labs     02/11/23 0527 02/12/23  0535 02/13/23  0949    140 135*   K 3.8 3.7 3.8    104 100   CO2 28 26 29   BUN 58* 59* 56*   CREATININE 1.8* 1.9* 1.8*   GLUCOSE 96 96 196*     Ca/Mg/Phos:   Recent Labs     02/11/23 0527 02/12/23  0535 02/13/23  0949   CALCIUM 8.6 8.7 8.6     Hepatic:   No results for input(s): AST, ALT, ALB, BILITOT, ALKPHOS in the last 72 hours. Troponin:   No results for input(s): TROPONINI in the last 72 hours. BNP: No results for input(s): BNP in the last 72 hours. Lipids:   No results for input(s): CHOL, TRIG, HDL, LDLCALC, LABVLDL in the last 72 hours. ABGs: No results for input(s): PHART, PO2ART, DRN2SVS in the last 72 hours. INR:   No results for input(s): INR in the last 72 hours. UA:No results for input(s): Frances Aj, GLUCOSEU, BILIRUBINUR, Karene Specter, BLOODU, PHUR, PROTEINU, UROBILINOGEN, NITRU, LEUKOCYTESUR, LABMICR, URINETYPE in the last 72 hours. Urine Microscopic: No results for input(s): LABCAST, BACTERIA, COMU, HYALCAST, WBCUA, RBCUA, EPIU in the last 72 hours.   Urine Culture: No results for input(s): LABURIN in the last 72 hours.  Urine Chemistry:   Recent Labs     02/11/23  0535 02/12/23  1050   LABCREA 88.7 139.8   PROTEINUR 9.00 14.00*             IMAGING:  XR CHEST PORTABLE   Final Result   Mild cardiomegaly with findings of pulmonary edema. Assessment/Plan   CKD 3     2. HTN    3. Anemia    4. Acid- base/ Electrolyte imbalance     5.  CHF     Plan   - continue IV diuresis   - monitor UO and renal function   - labs in am       Recommend to dose adjust all medications  based on renal functions  Maintain SBP> 90 mmHg   Daily weights   AVOID NSAIDs  Avoid Nephrotoxins  Monitor Intake/Output  Call if significant decrease in urine output                   Thank you for allowing us to participate in care of Anjali Bailey MD  Feel free to contact me   [facility] [address]  Office : [phone]  Fax :[phone]

## 2023-02-13 NOTE — PROGRESS NOTES
Patient called out said his blood sugar felt low. It was 56, he was given 240 mls of apple juice and 2 peanut butters and gram crackers.

## 2023-02-14 LAB
ANION GAP SERPL CALCULATED.3IONS-SCNC: 11 MMOL/L (ref 3–16)
BUN BLDV-MCNC: 59 MG/DL (ref 7–20)
CALCIUM SERPL-MCNC: 8.5 MG/DL (ref 8.3–10.6)
CHLORIDE BLD-SCNC: 102 MMOL/L (ref 99–110)
CO2: 26 MMOL/L (ref 21–32)
CREAT SERPL-MCNC: 2 MG/DL (ref 0.8–1.3)
GFR SERPL CREATININE-BSD FRML MDRD: 34 ML/MIN/{1.73_M2}
GLUCOSE BLD-MCNC: 137 MG/DL (ref 70–99)
GLUCOSE BLD-MCNC: 163 MG/DL (ref 70–99)
GLUCOSE BLD-MCNC: 175 MG/DL (ref 70–99)
GLUCOSE BLD-MCNC: 215 MG/DL (ref 70–99)
GLUCOSE BLD-MCNC: 52 MG/DL (ref 70–99)
GLUCOSE BLD-MCNC: 56 MG/DL (ref 70–99)
GLUCOSE BLD-MCNC: 81 MG/DL (ref 70–99)
PERFORMED ON: ABNORMAL
PERFORMED ON: NORMAL
POTASSIUM SERPL-SCNC: 3.9 MMOL/L (ref 3.5–5.1)
SODIUM BLD-SCNC: 139 MMOL/L (ref 136–145)

## 2023-02-14 PROCEDURE — 6360000002 HC RX W HCPCS: Performed by: INTERNAL MEDICINE

## 2023-02-14 PROCEDURE — 36415 COLL VENOUS BLD VENIPUNCTURE: CPT

## 2023-02-14 PROCEDURE — 6370000000 HC RX 637 (ALT 250 FOR IP): Performed by: NURSE PRACTITIONER

## 2023-02-14 PROCEDURE — 99233 SBSQ HOSP IP/OBS HIGH 50: CPT | Performed by: INTERNAL MEDICINE

## 2023-02-14 PROCEDURE — 2580000003 HC RX 258: Performed by: CLINICAL NURSE SPECIALIST

## 2023-02-14 PROCEDURE — 6370000000 HC RX 637 (ALT 250 FOR IP): Performed by: INTERNAL MEDICINE

## 2023-02-14 PROCEDURE — 2060000000 HC ICU INTERMEDIATE R&B

## 2023-02-14 PROCEDURE — 2580000003 HC RX 258: Performed by: INTERNAL MEDICINE

## 2023-02-14 PROCEDURE — 6360000002 HC RX W HCPCS: Performed by: CLINICAL NURSE SPECIALIST

## 2023-02-14 PROCEDURE — 94760 N-INVAS EAR/PLS OXIMETRY 1: CPT

## 2023-02-14 PROCEDURE — 80048 BASIC METABOLIC PNL TOTAL CA: CPT

## 2023-02-14 RX ORDER — INSULIN GLARGINE 100 [IU]/ML
15 INJECTION, SOLUTION SUBCUTANEOUS 2 TIMES DAILY
Status: DISCONTINUED | OUTPATIENT
Start: 2023-02-14 | End: 2023-02-19

## 2023-02-14 RX ADMIN — CARVEDILOL 3.12 MG: 3.12 TABLET, FILM COATED ORAL at 08:37

## 2023-02-14 RX ADMIN — FUROSEMIDE 80 MG: 10 INJECTION, SOLUTION INTRAMUSCULAR; INTRAVENOUS at 20:24

## 2023-02-14 RX ADMIN — INSULIN GLARGINE 15 UNITS: 100 INJECTION, SOLUTION SUBCUTANEOUS at 20:25

## 2023-02-14 RX ADMIN — FUROSEMIDE 80 MG: 10 INJECTION, SOLUTION INTRAMUSCULAR; INTRAVENOUS at 18:08

## 2023-02-14 RX ADMIN — CARVEDILOL 3.12 MG: 3.12 TABLET, FILM COATED ORAL at 18:08

## 2023-02-14 RX ADMIN — ENOXAPARIN SODIUM 30 MG: 100 INJECTION SUBCUTANEOUS at 20:24

## 2023-02-14 RX ADMIN — INSULIN GLARGINE 25 UNITS: 100 INJECTION, SOLUTION SUBCUTANEOUS at 08:41

## 2023-02-14 RX ADMIN — ATORVASTATIN CALCIUM 40 MG: 40 TABLET, FILM COATED ORAL at 08:37

## 2023-02-14 RX ADMIN — ENOXAPARIN SODIUM 30 MG: 100 INJECTION SUBCUTANEOUS at 08:38

## 2023-02-14 RX ADMIN — TAMSULOSIN HYDROCHLORIDE 0.8 MG: 0.4 CAPSULE ORAL at 20:24

## 2023-02-14 RX ADMIN — IRON SUCROSE 200 MG: 20 INJECTION, SOLUTION INTRAVENOUS at 10:02

## 2023-02-14 RX ADMIN — ASPIRIN 325 MG: 325 TABLET ORAL at 08:37

## 2023-02-14 RX ADMIN — FUROSEMIDE 80 MG: 10 INJECTION, SOLUTION INTRAMUSCULAR; INTRAVENOUS at 08:39

## 2023-02-14 RX ADMIN — Medication 10 ML: at 20:25

## 2023-02-14 RX ADMIN — Medication 10 ML: at 08:38

## 2023-02-14 ASSESSMENT — PAIN SCALES - GENERAL: PAINLEVEL_OUTOF10: 0

## 2023-02-14 NOTE — PROGRESS NOTES
Nephrology  Note                                                                                                                                                                                                                                                                                                                                                               Office : [phone]     Fax :[phone]              Patient's Name: Igor Warren  7:01 AM  2/14/2023          Good UO on lasix   Cr stable   SOB better      I/O last 3 completed shifts: In: 2147 [P.O.:1740]  Out: 5149 [Urine:5125]  No intake/output data recorded.           Allergies:  Latex    Current Medications:    insulin glargine (LANTUS) injection vial 25 Units, BID  furosemide (LASIX) injection 80 mg, TID  iron sucrose (VENOFER) 200 mg in sodium chloride 0.9 % 100 mL IVPB, Q24H  sodium chloride flush 0.9 % injection 10 mL, 2 times per day  sodium chloride flush 0.9 % injection 10 mL, PRN  0.9 % sodium chloride infusion, PRN  ondansetron (ZOFRAN-ODT) disintegrating tablet 4 mg, Q8H PRN   Or  ondansetron (ZOFRAN) injection 4 mg, Q6H PRN  senna (SENOKOT) tablet 8.6 mg, Daily PRN  acetaminophen (TYLENOL) tablet 650 mg, Q6H PRN   Or  acetaminophen (TYLENOL) suppository 650 mg, Q6H PRN  enoxaparin Sodium (LOVENOX) injection 30 mg, BID  potassium chloride (KLOR-CON M) extended release tablet 40 mEq, PRN   Or  potassium bicarb-citric acid (EFFER-K) effervescent tablet 40 mEq, PRN   Or  potassium chloride 10 mEq/100 mL IVPB (Peripheral Line), PRN  magnesium sulfate 2000 mg in 50 mL IVPB premix, PRN  aspirin tablet 325 mg, Daily  atorvastatin (LIPITOR) tablet 40 mg, Daily  tamsulosin (FLOMAX) capsule 0.8 mg, Nightly  insulin lispro (HUMALOG) injection vial 0-4 Units, TID WC  insulin lispro (HUMALOG) injection vial 0-4 Units, Nightly  dextrose bolus 10% 125 mL, PRN   Or  dextrose bolus 10% 250 mL, PRN  glucagon (rDNA) injection 1 mg, PRN  dextrose 10 % infusion, Continuous PRN  perflutren lipid microspheres (DEFINITY) injection 1.5 mL, ONCE PRN  carvedilol (COREG) tablet 3.125 mg, BID WC      Review of Systems:   14 point ROS obtained but were negative except mentioned in HPI      Physical exam:     Vitals:  BP (!) 97/39   Pulse 84   Temp 98.4 °F (36.9 °C) (Oral)   Resp 18   Ht 6' 1\" (1.854 m)   Wt 295 lb 9.6 oz (134.1 kg)   SpO2 98%   BMI 39.00 kg/m²   Constitutional:  OAA X3 NAD  Skin: no rash, turgor wnl  Heent:  eomi, mmm  Neck: no bruits or jvd noted  Cardiovascular:  S1, S2 without m/r/g  Respiratory: CTA B without w/r/r  Abdomen:  +bs, soft, nt, nd  Ext: + lower extremity edema  Psychiatric: mood and affect appropriate  Musculoskeletal:  Rom, muscular strength intact    Data:   Labs:  CBC:   Recent Labs     02/12/23 0535   WBC 6.0   HGB 11.2*        BMP:    Recent Labs     02/12/23  0535 02/13/23  0949 02/14/23  0512    135* 139   K 3.7 3.8 3.9    100 102   CO2 26 29 26   BUN 59* 56* 59*   CREATININE 1.9* 1.8* 2.0*   GLUCOSE 96 196* 52*     Ca/Mg/Phos:   Recent Labs     02/12/23  0535 02/13/23  0949 02/14/23  0512   CALCIUM 8.7 8.6 8.5     Hepatic:   No results for input(s): AST, ALT, ALB, BILITOT, ALKPHOS in the last 72 hours. Troponin:   No results for input(s): TROPONINI in the last 72 hours. BNP: No results for input(s): BNP in the last 72 hours. Lipids:   No results for input(s): CHOL, TRIG, HDL, LDLCALC, LABVLDL in the last 72 hours. ABGs: No results for input(s): PHART, PO2ART, BAV6KWD in the last 72 hours. INR:   No results for input(s): INR in the last 72 hours. UA:No results for input(s): Griffin Mace, GLUCOSEU, BILIRUBINUR, [place], BLOODU, PHUR, PROTEINU, UROBILINOGEN, NITRU, LEUKOCYTESUR, LABMICR, URINETYPE in the last 72 hours. Urine Microscopic: No results for input(s): LABCAST, BACTERIA, COMU, HYALCAST, WBCUA, RBCUA, EPIU in the last 72 hours.   Urine Culture: No results for input(s): Lavinia Duncan in the last 72 hours. Urine Chemistry:   Recent Labs     02/12/23  1050   LABCREA 139.8   PROTEINUR 14.00*             IMAGING:  XR CHEST PORTABLE   Final Result   Mild cardiomegaly with findings of pulmonary edema. Assessment/Plan   CKD 3     2. HTN    3. Anemia    4. Acid- base/ Electrolyte imbalance     5. CHF     Plan   - continue IV diuresis .    - daily weights   - monitor UO and renal function   - labs in am       Recommend to dose adjust all medications  based on renal functions  Maintain SBP> 90 mmHg   Daily weights   AVOID NSAIDs  Avoid Nephrotoxins  Monitor Intake/Output  Call if significant decrease in urine output                   Thank you for allowing us to participate in care of Nubia Rizo MD  Feel free to contact me   [facility] [address]  Office : [phone]  Fax :[phone]

## 2023-02-14 NOTE — PROGRESS NOTES
Patient is alert and oriented, cooperative, told the nurse that his cardiologist told him they were going to do a heart cath tomorrow since he already ate this morning. Patient is allowed to have 40 oz of water 3 hours before his procedure. Patient is aware, states he gets dry mouth and will be glad he is able to drink.

## 2023-02-14 NOTE — DISCHARGE INSTR - DIET
Good nutrition is important when healing from an illness, injury, or surgery. Follow any nutrition recommendations given to you during your hospital stay. If you were given an oral nutrition supplement while in the hospital, continue to take this supplement at home. You can take it with meals, in-between meals, and/or before bedtime. These supplements can be purchased at most local grocery stores, pharmacies, and chain super-stores. If you have any questions about your diet or nutrition, call the hospital and ask for the dietitian. For nutrition questions after discharge please call the Registered Dietitian at [phone]. Heart Failure Nutrition Therapy  This diet will help you feel better and support your heart by reducing symptoms of fluid retention, shortness of breath and swelling. You should focus on:  Limiting sodium in your diet by reading labels and limiting foods high in sodium. Limit your daily sodium intake to 2,000 mg per day. Select foods with 140 mg of sodium or less per serving. Foods with more than 300 mg of sodium per serving may not fit into a reduced-sodium meal plan. Check serving sizes. If you eat more than 1 serving, you will get more sodium than the amount listed. Limiting fluid in your diet. Ask your doctor how much fluid you can have per day  Remember foods that are liquid at room temperature such as popsicles, soup, ice cream and Jell-O are fluids. Checking your weight to make sure you're not retaining too much fluid. Weigh yourself every morning. If you gain 3 or more pounds in one day or 5 pounds within 1 week, call your doctor. Foods to choose and avoid:  Avoid processed foods. Eat more fresh foods. Fresh and frozen fruits and vegetables are good choices. Choose fresh meats. Avoid processed meats such as gabriel, sausage and hot dogs. Do not add salt to your food while cooking or at the table.   Try dry or fresh herbs, pepper, lemon juice, or a sodium-free seasoning blend such as Mrs. Dash to add flavor to food. Do not use a salt substitute. Use caution at [employer] foods are high in sodium. Ask for your food to be cooked without salt and request sauces and dressing to come on the side. 

## 2023-02-14 NOTE — CONSULTS
[facility]  [phone]      No chief complaint on file. SOB        History of Present Illness:  Lu Warren is a 69 y.o. patient who presented to the hospital with complaints of SOB and AMS. He was found to have LE edema and acute CHF, systolic. EF <30%. H/o CABG >20 years ago. CKD stage 3. Creatinine is above baseline. SOB has improved with diuresis. Denies angina. have been asked to provide consultation regarding further management and testing. Past Medical History:   has no past medical history on file. Surgical History:   has no past surgical history on file. Social History:        Family History:  family history is not on file. Home Medications:  Were reviewed and are listed in nursing record. and/or listed below  Prior to Admission medications    Medication Sig Start Date End Date Taking?  Authorizing Provider   atorvastatin (LIPITOR) 40 MG tablet Take 40 mg by mouth daily Take at bedtime   Yes Historical Provider, MD   Insulin Detemir (LEVEMIR FLEXTOUCH SC) Inject 30 Units into the skin 2 times daily   Yes Historical Provider, MD   tamsulosin (FLOMAX) 0.4 MG capsule Take 0.4 mg by mouth daily Take at bedtime   Yes Historical Provider, MD   nateglinide (STARLIX) 120 MG tablet Take 120 mg by mouth 3 times daily (before meals)   Yes Historical Provider, MD   losartan-hydroCHLOROthiazide (HYZAAR) 50-12.5 MG per tablet Take 1 tablet by mouth daily   Yes Historical Provider, MD   pioglitazone (ACTOS) 45 MG tablet Take 45 mg by mouth daily Each morning   Yes Historical Provider, MD   amLODIPine (NORVASC) 5 MG tablet Take 5 mg by mouth daily Each evening   Yes Historical Provider, MD   furosemide (LASIX) 40 MG tablet Take 40 mg by mouth daily Each morning   Yes Historical Provider, MD   aspirin 325 MG tablet Take 325 mg by mouth daily Each evening   Yes Historical Provider, MD        Current Medications:  Current Facility-Administered Medications   Medication Dose Route Frequency Provider Last Rate Last Admin    insulin glargine (LANTUS) injection vial 15 Units  15 Units SubCUTAneous BID Anastasiya Adames APRN - CNP        furosemide (LASIX) injection 80 mg  80 mg IntraVENous TID Ernestine Rivera MD   80 mg at 02/14/23 1808    sodium chloride flush 0.9 % injection 10 mL  10 mL IntraVENous 2 times per day Juan Shore MD   10 mL at 02/14/23 0838    sodium chloride flush 0.9 % injection 10 mL  10 mL IntraVENous PRN Juan Shore MD   10 mL at 02/11/23 1752    0.9 % sodium chloride infusion   IntraVENous PRN Juan Shore MD        ondansetron (ZOFRAN-ODT) disintegrating tablet 4 mg  4 mg Oral Q8H PRN Juan Shore MD        Or    ondansetron [facility]) injection 4 mg  4 mg IntraVENous Q6H PRN Juan Shore MD        sencristina [facility]) tablet 8.6 mg  1 tablet Oral Daily PRN Juan Shore MD   8.6 mg at 02/09/23 2102    acetaminophen (TYLENOL) tablet 650 mg  650 mg Oral Q6H PRN Juan Shore MD   650 mg at 02/08/23 1626    Or    acetaminophen (TYLENOL) suppository 650 mg  650 mg Rectal Q6H PRN Juan Shore MD        enoxaparin Sodium (LOVENOX) injection 30 mg  30 mg SubCUTAneous BID Juan Shore MD   30 mg at 02/14/23 0838    potassium chloride (KLOR-CON M) extended release tablet 40 mEq  40 mEq Oral PRN Juan Shore MD   40 mEq at 02/09/23 1456    Or    potassium bicarb-citric acid (EFFER-K) effervescent tablet 40 mEq  40 mEq Oral PRN Juan Shore MD        Or    potassium chloride 10 mEq/100 mL IVPB (Peripheral Line)  10 mEq IntraVENous PRN Jaun Shore MD        magnesium sulfate 2000 mg in 50 mL IVPB premix  2,000 mg IntraVENous PRN Juan Shore MD        aspirin tablet 325 mg  325 mg Oral Daily Juan Shore MD   325 mg at 02/14/23 0837    atorvastatin (LIPITOR) tablet 40 mg  40 mg Oral Daily Juan Shore MD   40 mg at 02/14/23 0837    tamsulosin (FLOMAX) capsule 0.8 mg  0.8 mg Oral Nightly Juan Shore MD   0.8 mg at 02/13/23 2030    insulin lispro (HUMALOG) injection vial 0-4 Units  0-4 Units SubCUTAneous TID  Valentine Daugherty MD   1 Units at 02/13/23 1210    insulin lispro (HUMALOG) injection vial 0-4 Units  0-4 Units SubCUTAneous Nightly Valentine Daugherty MD        dextrose bolus 10% 125 mL  125 mL IntraVENous PRN Valentine Daugherty MD        Or    dextrose bolus 10% 250 mL  250 mL IntraVENous PRN Valentine Daugherty MD        glucagon (rDNA) injection 1 mg  1 mg SubCUTAneous PRN Valentine Daugherty MD        dextrose 10 % infusion   IntraVENous Continuous PRN Valentine Daugherty MD        perflutren lipid microspheres (DEFINITY) injection 1.5 mL  1.5 mL IntraVENous ONCE PRN Nelson Solomon MD        carvedilol (COREG) tablet 3.125 mg  3.125 mg Oral BID  Reba Griffith MD   3.125 mg at 02/14/23 1808        Allergies:  Latex     Review of Systems:     Constitutional: there has been no unanticipated weight loss. There's been no change in energy level, sleep pattern, or activity level. Eyes: No visual changes or diplopia. No scleral icterus. ENT: No Headaches, hearing loss or vertigo. No mouth sores or sore throat. Cardiovascular: Reviewed in HPI  Respiratory: No cough or wheezing, no sputum production. No hematemesis. Gastrointestinal: No abdominal pain, appetite loss, blood in stools. No change in bowel or bladder habits. Genitourinary: No dysuria, trouble voiding, or hematuria. Musculoskeletal:  No gait disturbance, weakness or joint complaints. Integumentary: No rash or pruritis. Neurological: No headache, diplopia, change in muscle strength, numbness or tingling. No change in gait, balance, coordination, mood, affect, memory, mentation, behavior. Psychiatric: No anxiety, no depression. Endocrine: No malaise, fatigue or temperature intolerance. No excessive thirst, fluid intake, or urination. No tremor. Hematologic/Lymphatic: No abnormal bruising or bleeding, blood clots or swollen lymph nodes. Allergic/Immunologic: No nasal congestion or hives.       Physical Examination:    Vitals:    02/14/23 1529   BP: 115/65   Pulse: 64   Resp: 18   Temp: 98 °F (36.7 °C)   SpO2: 99%    Weight: 295 lb (133.8 kg)         General Appearance:  Alert, cooperative, no distress, appears stated age   Head:  Normocephalic, without obvious abnormality, atraumatic   Eyes:  PERRL, conjunctiva/corneas clear       Nose: Nares normal, no drainage or sinus tenderness   Throat: Lips, mucosa, and tongue normal   Neck: Supple, symmetrical, trachea midline, no adenopathy, thyroid: not enlarged, symmetric, no tenderness/mass/nodules, no carotid bruit or JVD       Lungs:   Clear to auscultation bilaterally, respirations unlabored   Chest Wall:  No tenderness or deformity   Heart:  Regular rate and rhythm, S1, S2 normal, no murmur, rub or gallop   Abdomen:   Soft, non-tender, bowel sounds active all four quadrants,  no masses, no organomegaly           Extremities: Extremities normal, atraumatic, no cyanosis or edema   Pulses: 2+ and symmetric   Skin: Skin color, texture, turgor normal, no rashes or lesions   Pysch: Normal mood and affect   Neurologic: Normal gross motor and sensory exam.         Labs  CBC:   Lab Results   Component Value Date/Time    WBC 6.0 02/12/2023 05:35 AM    RBC 3.76 02/12/2023 05:35 AM    HGB 11.2 02/12/2023 05:35 AM    HCT 34.5 02/12/2023 05:35 AM    MCV 91.6 02/12/2023 05:35 AM    RDW 13.9 02/12/2023 05:35 AM     02/12/2023 05:35 AM     CMP:    Lab Results   Component Value Date/Time     02/14/2023 05:12 AM    K 3.9 02/14/2023 05:12 AM     02/14/2023 05:12 AM    CO2 26 02/14/2023 05:12 AM    BUN 59 02/14/2023 05:12 AM    CREATININE 2.0 02/14/2023 05:12 AM    LABGLOM 34 02/14/2023 05:12 AM    GLUCOSE 52 02/14/2023 05:12 AM    PROT 6.6 02/09/2023 05:46 AM    CALCIUM 8.5 02/14/2023 05:12 AM    BILITOT 0.6 02/09/2023 05:46 AM    ALKPHOS 96 02/09/2023 05:46 AM    AST 18 02/09/2023 05:46 AM    ALT 16 02/09/2023 05:46 AM     PT/INR:  No results found for: PTINR  Lab Results   Component Value Date    TROPONINI 0.46 (H) 02/08/2023       EKG:  I have reviewed EKG with the following interpretation:  Impression:  Demand pacemaker; interpretation is based on intrinsic rhythmSinus rhythm with short HI with Premature atrial complexes with Aberrant conductionLeft axis deviationNon-specific intra-ventricular conduction blockInferior infarct (cited on or before 08-FEB-2023)Anterolateral infarct (cited on or before 08-FEB-2023)Abnormal ECGWhen compared with ECG of 08-FEB-2023 05:41,Electronic demand pacing is now PresentFusion complexes are no longer PresentPR interval has decreasedSerial changes of Anterior infarct     Pt has CAD with following history:  CABG: (date/details),   Valve replacement (date/details)   GXT/Myoview/Lexiscan: (date)  (results)   Stress/echo: (date) (result)   CATH/PCI:  (date)- (results)  - (# and type of stents) -   ECHO: (date) results EF    %. SELVIN (date) (results)  EP procedures/studies/ablation:  (specific data). Device information:  Event monitor: (date/results)    All testing and labs listed below were personally reviewed. Echo 2/9/2023   Summary   Left ventricular size is moderately increased. Left ventricular systolic function is severely depressed with ejection   fraction estimated at 25-30%. Global hypokinesis with hypokinesis more pronounced in the inferior and   septal walls. There is mild concentric left ventricular hypertrophy. Grade II diastolic dysfunction with elevated LV filling pressures. Mild to moderate mitral regurgitation. The left atrium is mildly dilated. Moderate tricuspid regurgitation. Systolic pulmonary artery pressure (SPAP) is normal and estimated at 30 mmHg   (right atrial pressure 8 mmHg).     Assessment  Patient Active Problem List   Diagnosis    Acute systolic congestive heart failure ([address]    Coronary artery disease due to calcified coronary lesion    Stage 3 chronic kidney disease (HCC)    Type 2 diabetes mellitus with stage 3 chronic kidney disease, without long-term current use of insulin (HCC)    Edema    Hyperlipidemia    Iron deficiency anemia    Chronic systolic (congestive) heart failure (HCC)    Electrolyte imbalance    Primary hypertension    Cardiomyopathy ([facility]         Plan:    I had the opportunity to review the clinical symptoms and presentation of Indigo Young. Assessment/Plan:  Principal Problem:  Acute systolic congestive heart failure / cardiomyopathy  Plan: diuresing well with IV lasix. Cont lasix and coreg. NO ace/arb due to CKD. Consider aldactone and jardiance. Concern for ischemic cause. HF following    Coronary artery disease due to calcified coronary lesion  Plan:   S/p CABG early 2000s at [facility]   Mr. Faustino Chen has been following with PCP for cardiovascular follow up after his primary cardiologist retired. Has not had cardiac testing in the last 10 years. Troponin elevated 0.46 on admission, has not been rechecked. Possibly demand ischemia and CHF and RAY. BNP 7200 on admission, now 4489  EF 25-30%, no testing available for comparison   Will defer ischemic eval until CRE stable, Recheck CRE tomorrow  BB / Roberth Stuart / Statin     Stage 3 chronic kidney disease ([facility]  Plan: CRE 2.0, baseline unknown  Nephrology following    Hyperlipidemia  2/8/2023  TG 69 HDL 43 LDL 45  Meds ~ atorvastatin  Plan ~ controlled, continue home meds     Primary hypertension  Plan: stable      Recommend ischemic evaluation with [facility] when Creatinine returns to baseline. I will address the patient's cardiac risk factors and adjusted pharmacologic treatment as needed. In addition, I have reinforced the need for patient directed risk factor modification. Tobacco use was discussed with the patient and educated on the negative effects. I have asked the patient to not utilize these agents. Thank you for allowing to us to participate in the care or Indigo Young.  Further evaluation will be based upon the patient's clinical course and testing results. All questions and concerns were addressed to the patient/family. Alternatives to my treatment were discussed. The note was completed using EMR. Every effort was made to ensure accuracy; however, inadvertent computerized transcription errors may be present.     Shelia Chang MD,  2/14/2023 6:54 PM

## 2023-02-14 NOTE — PROGRESS NOTES
[facility] PROGRESS NOTE    2/14/2023 9:05 AM        Name: Robb Wilson . Admitted: 2/8/2023  Primary Care Provider: No primary care provider on file. (Tel: None)      Subjective: Constantin Ott is a 69 y.o. male with a past medical history of hypertension, hyperlipidemia, DM2, DKD3, and CAD s/p remote CABG who presented from [facility] with chest pressure and SOB with accompanying edema. He has had poor cardiac follow up in the past.  He was started on lasix by PCP without improvement. Troponin 0.45 in ER an he was transferred to [facility] for NSTEMI and acute CHF. Interval History: Today he is feeling better. Down 10 L, down 13 lbs. Feeling better. SOB improved. Orthopnea resolved. Continues with LE swelling. No CP. Denies GI/ symptoms. No N/V/D/C. Independently reviewed interval ancillary notes from cardiology. Problem List  Principal Problem:    Acute systolic congestive heart failure (HCC)  Active Problems:    Coronary artery disease due to calcified coronary lesion    Stage 3 chronic kidney disease (HCC)    Type 2 diabetes mellitus with stage 3 chronic kidney disease, without long-term current use of insulin (HCC)    Edema    Hyperlipidemia    Iron deficiency anemia    Chronic systolic (congestive) heart failure (HCC)    Electrolyte imbalance    Primary hypertension    Cardiomyopathy ([address]  Resolved Problems:    * No resolved hospital problems.  *     Assessment and Plan:    Acute Combined CHF   - Appears overloaded   - Echo with EF 25-30% with GIIDD   - On IV lasix TID  Cardiomyopathy   - New problem, EF 25-30% with global hypokinesis more pronounced in the inferior and septal walls   - Plans for ischemic evaluation this admission  CKD 3   - cr stable today 2   - nephrology following Dr. Dari Mckenzie, continue IV diuresis  NSTEMI/CAD   - Remote hx of CABG   - Considering [facility] tomorrow  DM2    - A1C 6.8   - On Levemir, actos, and nateglinide at home    - Reviewed BG and recurrent hypoglycemia with am to 52; Reduce lantus to 15 units bid    - No Actos at discharge as can contribute to LE swelling  Hypertension   - Controlled, Norvasc on hold for soft BP  Hyperlipidemia   - LDL 45, continue with statin   Normocytic Anemia    - Hgb 11.2 with low iron, receiving IV Venofer x5   - Needs OP colonoscopy screening   - CBC in am     NPO at midnight for possible LHC tomorrow pending renal fx     Discussed care with patient and nursing  Discussed case, assessment and plan of care with Dr. Stevenson Kendrick: ADULT DIET; Regular;  No Added Salt (3-4 gm); 2000 ml  Code:Full Code  DVT PPX: Lovenox PPx    Disposition: Home without needs when medically able, likely needs 2 additional days for diuresis and further testing    Current Medications  insulin glargine (LANTUS) injection vial 25 Units, BID  furosemide (LASIX) injection 80 mg, TID  iron sucrose (VENOFER) 200 mg in sodium chloride 0.9 % 100 mL IVPB, Q24H  sodium chloride flush 0.9 % injection 10 mL, 2 times per day  sodium chloride flush 0.9 % injection 10 mL, PRN  0.9 % sodium chloride infusion, PRN  ondansetron (ZOFRAN-ODT) disintegrating tablet 4 mg, Q8H PRN   Or  ondansetron (ZOFRAN) injection 4 mg, Q6H PRN  senna (SENOKOT) tablet 8.6 mg, Daily PRN  acetaminophen (TYLENOL) tablet 650 mg, Q6H PRN   Or  acetaminophen (TYLENOL) suppository 650 mg, Q6H PRN  enoxaparin Sodium (LOVENOX) injection 30 mg, BID  potassium chloride (KLOR-CON M) extended release tablet 40 mEq, PRN   Or  potassium bicarb-citric acid (EFFER-K) effervescent tablet 40 mEq, PRN   Or  potassium chloride 10 mEq/100 mL IVPB (Peripheral Line), PRN  magnesium sulfate 2000 mg in 50 mL IVPB premix, PRN  aspirin tablet 325 mg, Daily  atorvastatin (LIPITOR) tablet 40 mg, Daily  tamsulosin (FLOMAX) capsule 0.8 mg, Nightly  insulin lispro (HUMALOG) injection vial 0-4 Units, TID WC  insulin lispro (HUMALOG) injection vial 0-4 Units, Nightly  dextrose bolus 10% 125 mL, PRN   Or  dextrose bolus 10% 250 mL, PRN  glucagon (rDNA) injection 1 mg, PRN  dextrose 10 % infusion, Continuous PRN  perflutren lipid microspheres (DEFINITY) injection 1.5 mL, ONCE PRN  carvedilol (COREG) tablet 3.125 mg, BID WC        Objective:  BP (!) 114/55   Pulse 84   Temp 98.4 °F (36.9 °C) (Oral)   Resp 18   Ht 6' 1\" (1.854 m)   Wt 295 lb (133.8 kg)   SpO2 98%   BMI 38.92 kg/m²   Vitals:    02/14/23 0830   BP: (!) 114/55   Pulse:    Resp:    Temp:    SpO2:        Intake/Output Summary (Last 24 hours) at 2/14/2023 8098  Last data filed at 2/14/2023 0830  Gross per 24 hour   Intake 1260 ml   Output 3775 ml   Net -2515 ml      Wt Readings from Last 3 Encounters:   02/14/23 295 lb (133.8 kg)       Review of Systems:  Constitutional: No fever, Yes sudden weight changes, No weakness  Skin: No excessive bruising, No bleeding, No changes in skin pigment, normal turgor  Respiratory: Yes SOB, No cough, No wheezing, No recent URI  Cardiovascular: Negative for CP, palpitations, dizziness, or syncope  Gastrointestinal: No abdominal pain, No nausea, No vomiting, No diarrhea, No constipation, No black/tarry stools  Genito-Urinary: No hematuria, No dysuria, No polyuria,   Musculoskeletal: No pain, No swelling, No new mobility concerns  Endocrine: No excessive sweating   Neurological/Psych: No for confusion, No TIA-like symptoms. Denies any acute depression, anxiety, or insomnia. Physical Examination:  Telemetry: Personally Reviewed Normal sinus rhythm, IVCD, PVCs occasional  Constitutional: Cooperative and in no apparent distress, appears well nourished, Yes obesity  Skin: Warm and pink; no cyanosis, bruising, or clubbing, No lesions/incisions  HEENT: Symmetric and normocephalic. Conjunctiva pink with clear sclera. Mucus membranes pink and moist.   Cardiovascular: regular rate and rhythm. S1 & S2, negative for murmurs.  Peripheral pulses 2+, Yes peripheral edema 2+  Respiratory: Respirations symmetric and unlabored. Lungs clear to auscultation bilaterally, no wheezing, crackles, or rhonchi + diminished BLL  Gastrointestinal: Abdomen soft and round. normal bowel sounds. No tenderness  Musculoskeletal: No focal weakness, muscle strength 5/5 bilaterally  Neurologic/Psych: Awake and orientated to person, place and time. Calm affect, appropriate mood. Pertinent labs, diagnostic, and imaging results reviewed as a part of this visit    Labs and Tests:  CBC:   Recent Labs     02/12/23 0535   WBC 6.0   HGB 11.2*        BMP:    Recent Labs     02/12/23 0535 02/13/23  0949 02/14/23  0512    135* 139   K 3.7 3.8 3.9    100 102   CO2 26 29 26   BUN 59* 56* 59*   CREATININE 1.9* 1.8* 2.0*   GLUCOSE 96 196* 52*     Hepatic: No results for input(s): AST, ALT, ALB, BILITOT, ALKPHOS in the last 72 hours. Relevant results:  CXR 2/8/2023:  Mild cardiomegaly with findings of pulmonary edema. Echo 2/9/2023:  Summary   Left ventricular size is moderately increased. Left ventricular systolic function is severely depressed with ejection   fraction estimated at 25-30%. Global hypokinesis with hypokinesis more pronounced in the inferior and septal walls. There is mild concentric left ventricular hypertrophy. Grade II diastolic dysfunction with elevated LV filling pressures. Mild to moderate mitral regurgitation. The left atrium is mildly dilated. Moderate tricuspid regurgitation. Systolic pulmonary artery pressure (SPAP) is normal and estimated at 30 mmHg (right atrial pressure 8 mmHg).     ZION Haro - CNP   2/14/2023 9:05 AM

## 2023-02-14 NOTE — PROGRESS NOTES
[facility]     Daily Progress Note      Admit Date:  2/8/2023    ASSESSMENT AND PLAN:  Acute systolic heart failure  Ischemic cardiomyopathy  HTN  CAD in native artery s/p CABG  Elevated troponin, unsure if type 1 versus type 2 NSTEMI      Acute Heart Failure:  Cath today to evaluate etiology and treat new CAD lesions as needed  Continue aggressive IV diuretics  Able to lie flat in bed now, on room air     Ischemic Cardiomyopathy: New  ~Echo:  EF: 25-30%   Core measures for HF:  ACEi/ARB/ARNI: add entresto after diuresis and after IV dye load from cath (possibly start tomorrow)  BB: Carvedilol  Brian:  will add prior to discharge  Hydralazine/nitrates:  SGLT2i:  will add jardiance prior to d/c  ~Device: none  ~Plan: needs ischemic eval as noted above     HTN:   ~controlled      CAD:   ~denies CP today, drop in EF  ~Meds:   Statin lipitor  BB: Carvedilol  ASA  ~Plan:  cath today                       RAY on CKD   ~Daily labs; trend creatinine  ~Nephrology consulted, Recs: continue diuresis  GILDA              ~Venofer    7. DM - mgmt per medicine    8. Lipids - very well controlled currently, continue current dose of statin    Subjective:  Mr. Vanessa Tesfaye feels a lot better. Can lie flat in bed now. Not on oxygen. Continues diuresing well. Has walked a few laps around the unit. No chest pain.     Objective:   BP (!) 114/55   Pulse 84   Temp 98.4 °F (36.9 °C) (Oral)   Resp 18   Ht 6' 1\" (1.854 m)   Wt 295 lb (133.8 kg)   SpO2 98%   BMI 38.92 kg/m²     Intake/Output Summary (Last 24 hours) at 2/14/2023 1038  Last data filed at 2/14/2023 1002  Gross per 24 hour   Intake 1500 ml   Output 3775 ml   Net -2275 ml       TELEMETRY: Sinus     Physical Exam:  General:  Awake, alert, oriented x 3, NAD  Skin:  Warm and dry  Neck:  JVD none  Chest:  normal air entry  Cardiovascular:  RRR S1S2, no S3, no murmur  Abdomen:  Soft, ND, NT, No HSM  Extremities:  No edema    Medications:    insulin glargine  15 Units SubCUTAneous BID    furosemide  80 mg IntraVENous TID    sodium chloride flush  10 mL IntraVENous 2 times per day    enoxaparin  30 mg SubCUTAneous BID    aspirin  325 mg Oral Daily    atorvastatin  40 mg Oral Daily    tamsulosin  0.8 mg Oral Nightly    insulin lispro  0-4 Units SubCUTAneous TID WC    insulin lispro  0-4 Units SubCUTAneous Nightly    carvedilol  3.125 mg Oral BID WC      sodium chloride      dextrose       sodium chloride flush, sodium chloride, ondansetron **OR** ondansetron, senna, acetaminophen **OR** acetaminophen, potassium chloride **OR** potassium alternative oral replacement **OR** potassium chloride, magnesium sulfate, dextrose bolus **OR** dextrose bolus, glucagon (rDNA), dextrose, perflutren lipid microspheres    Lab Data:  CBC:   Recent Labs     02/12/23 0535   WBC 6.0   HGB 11.2*   HCT 34.5*   MCV 91.6        BMP:   Recent Labs     02/12/23  0535 02/13/23  0949 02/14/23  0512    135* 139   K 3.7 3.8 3.9    100 102   CO2 26 29 26   BUN 59* 56* 59*   CREATININE 1.9* 1.8* 2.0*     LIVER PROFILE: No results for input(s): AST, ALT, LIPASE, BILIDIR, BILITOT, ALKPHOS in the last 72 hours. Invalid input(s): AMYLASE,  ALB  PT/INR: No results for input(s): PROTIME, INR in the last 72 hours. APTT: No results for input(s): APTT in the last 72 hours.   BNP:    Lab Results   Component Value Date/Time    PROBNP 4,489 02/13/2023 09:49 AM    PROBNP 5,672 02/10/2023 04:50 AM    PROBNP 7,442 02/09/2023 05:46 AM       TROP:   Troponin   Date/Time Value Ref Range Status   02/08/2023 06:03 AM 0.46 (H) <0.01 ng/mL Final     Comment:     Methodology by Troponin T         LIPIDS:   Lab Results   Component Value Date/Time    CHOL 102 02/08/2023 06:03 AM    TRIG 69 02/08/2023 06:03 AM    HDL 43 02/08/2023 06:03 AM    LDLCALC 45 02/08/2023 06:03 AM    LABVLDL 14 02/08/2023 06:03 AM       Elizabeth Estrada MD, MD 2/14/2023 10:38 AM

## 2023-02-14 NOTE — PROGRESS NOTES
Nutrition Note    RECOMMENDATIONS  PO Diet: Continue current diet     NUTRITION ASSESSMENT   Pt triggered for LOS assessment. On VIJAY, 2000 mL fluid restriction. Documented intakes of % throughout admission. Hx of CHF; wt down 13 lb from admission, receiving lasix and -10.7L. Upon visiting, pt reported no appetite or po intake issues both now or prior to current admission. Denied need for verbal CHF education, will place information in discharge instructions. Deemed to be at low nutritional risk at this time. RD will continue to monitor should pt's status change. Nutrition Related Findings: Lytes obtained today WNL. HbA1c of 6.8% on 2/10. Episodes of hypoglycemia (glucose  today). LBM 2/12, BS hypoactive. +1 BLE edema. Wounds: None  Nutrition Education:  Education declined   Nutrition Goals: PO intake 75% or greater, by next RD assessment     MALNUTRITION ASSESSMENT   Malnutrition Status: No malnutrition    NUTRITION DIAGNOSIS   No nutrition diagnosis at this time     CURRENT NUTRITION THERAPIES  ADULT DIET; Regular; No Added Salt (3-4 gm); 2000 ml     PO Intake: %   PO Supplement Intake:None Ordered    ANTHROPOMETRICS  Current Height: 6' 1\" (185.4 cm)  Current Weight: 295 lb (133.8 kg)    Admission weight: 308 lb (139.7 kg) (bed wt)  Ideal Body Weight (IBW): 184 lbs  (84 kg)        BMI: 38.9    The patient will be monitored per nutrition standards of care. Consult dietitian if additional nutrition interventions are needed prior to RD reassessment.      Rachelle Aburto MS, RD, LD    Contact: [phone]

## 2023-02-15 PROBLEM — N18.9 ACUTE KIDNEY INJURY SUPERIMPOSED ON CKD (HCC): Status: ACTIVE | Noted: 2023-02-15

## 2023-02-15 PROBLEM — N17.9 ACUTE KIDNEY INJURY SUPERIMPOSED ON CKD (HCC): Status: ACTIVE | Noted: 2023-02-15

## 2023-02-15 LAB
ANION GAP SERPL CALCULATED.3IONS-SCNC: 11 MMOL/L (ref 3–16)
BASOPHILS ABSOLUTE: 0.1 K/UL (ref 0–0.2)
BASOPHILS RELATIVE PERCENT: 1.2 %
BUN BLDV-MCNC: 58 MG/DL (ref 7–20)
CALCIUM SERPL-MCNC: 8.9 MG/DL (ref 8.3–10.6)
CHLORIDE BLD-SCNC: 101 MMOL/L (ref 99–110)
CO2: 27 MMOL/L (ref 21–32)
CREAT SERPL-MCNC: 1.9 MG/DL (ref 0.8–1.3)
EOSINOPHILS ABSOLUTE: 0.2 K/UL (ref 0–0.6)
EOSINOPHILS RELATIVE PERCENT: 3.8 %
GFR SERPL CREATININE-BSD FRML MDRD: 36 ML/MIN/{1.73_M2}
GLUCOSE BLD-MCNC: 150 MG/DL (ref 70–99)
GLUCOSE BLD-MCNC: 213 MG/DL (ref 70–99)
GLUCOSE BLD-MCNC: 231 MG/DL (ref 70–99)
GLUCOSE BLD-MCNC: 85 MG/DL (ref 70–99)
GLUCOSE BLD-MCNC: 87 MG/DL (ref 70–99)
GLUCOSE BLD-MCNC: 91 MG/DL (ref 70–99)
GLUCOSE BLD-MCNC: 99 MG/DL (ref 70–99)
HCT VFR BLD CALC: 35.6 % (ref 40.5–52.5)
HEMOGLOBIN: 11.6 G/DL (ref 13.5–17.5)
LYMPHOCYTES ABSOLUTE: 1 K/UL (ref 1–5.1)
LYMPHOCYTES RELATIVE PERCENT: 15 %
MCH RBC QN AUTO: 30 PG (ref 26–34)
MCHC RBC AUTO-ENTMCNC: 32.6 G/DL (ref 31–36)
MCV RBC AUTO: 92 FL (ref 80–100)
MONOCYTES ABSOLUTE: 0.8 K/UL (ref 0–1.3)
MONOCYTES RELATIVE PERCENT: 12.6 %
NEUTROPHILS ABSOLUTE: 4.4 K/UL (ref 1.7–7.7)
NEUTROPHILS RELATIVE PERCENT: 67.4 %
PDW BLD-RTO: 13.5 % (ref 12.4–15.4)
PERFORMED ON: ABNORMAL
PERFORMED ON: NORMAL
PLATELET # BLD: 153 K/UL (ref 135–450)
PMV BLD AUTO: 10.6 FL (ref 5–10.5)
POTASSIUM SERPL-SCNC: 3.8 MMOL/L (ref 3.5–5.1)
RBC # BLD: 3.87 M/UL (ref 4.2–5.9)
SODIUM BLD-SCNC: 139 MMOL/L (ref 136–145)
WBC # BLD: 6.6 K/UL (ref 4–11)

## 2023-02-15 PROCEDURE — 36415 COLL VENOUS BLD VENIPUNCTURE: CPT

## 2023-02-15 PROCEDURE — 80048 BASIC METABOLIC PNL TOTAL CA: CPT

## 2023-02-15 PROCEDURE — 99233 SBSQ HOSP IP/OBS HIGH 50: CPT | Performed by: INTERNAL MEDICINE

## 2023-02-15 PROCEDURE — 6370000000 HC RX 637 (ALT 250 FOR IP): Performed by: INTERNAL MEDICINE

## 2023-02-15 PROCEDURE — 2580000003 HC RX 258: Performed by: INTERNAL MEDICINE

## 2023-02-15 PROCEDURE — 6360000002 HC RX W HCPCS: Performed by: INTERNAL MEDICINE

## 2023-02-15 PROCEDURE — 85025 COMPLETE CBC W/AUTO DIFF WBC: CPT

## 2023-02-15 PROCEDURE — 2060000000 HC ICU INTERMEDIATE R&B

## 2023-02-15 RX ADMIN — ENOXAPARIN SODIUM 30 MG: 100 INJECTION SUBCUTANEOUS at 21:13

## 2023-02-15 RX ADMIN — CARVEDILOL 3.12 MG: 3.12 TABLET, FILM COATED ORAL at 17:56

## 2023-02-15 RX ADMIN — Medication 10 ML: at 21:13

## 2023-02-15 RX ADMIN — INSULIN LISPRO 1 UNITS: 100 INJECTION, SOLUTION INTRAVENOUS; SUBCUTANEOUS at 17:56

## 2023-02-15 RX ADMIN — Medication 10 ML: at 10:27

## 2023-02-15 RX ADMIN — ATORVASTATIN CALCIUM 40 MG: 40 TABLET, FILM COATED ORAL at 10:32

## 2023-02-15 RX ADMIN — TAMSULOSIN HYDROCHLORIDE 0.8 MG: 0.4 CAPSULE ORAL at 21:13

## 2023-02-15 NOTE — PROGRESS NOTES
[facility]     Daily Progress Note      Admit Date:  2/8/2023    ASSESSMENT AND PLAN:  Acute systolic heart failure  Ischemic cardiomyopathy  HTN  CAD in native artery s/p CABG  Elevated troponin, unsure if type 1 versus type 2 NSTEMI  RAY on CKD (baseline creatinine 1.6, was 2.0 yesterday, 1.9 today)     Plan:    -Cath as soon as Cr back to baseline 1.6; lasix on hold now; appreciate nephro recs  -Ischemic Cardiomyopathy: New  ~Echo:  EF: 25-30%   Core measures for HF:  ACEi/ARB/ARNI: add entresto after diuresis and after IV dye load from cath (possibly start tomorrow)  BB: Carvedilol  Brian:  will add prior to discharge  Hydralazine/nitrates:  SGLT2i:  will add jardiance prior to d/c  ~Device: none  ~Plan: needs ischemic eval as noted above      Subjective:  Mr. Vashti Bruce feels well. Hungry as he had been NPO all day for a possible cath today. No chest pain or dyspnea. Leg swelling improved.     Objective:   BP (!) 103/52   Pulse 66   Temp 98.4 °F (36.9 °C) (Oral)   Resp 16   Ht 6' 1\" (1.854 m)   Wt 290 lb 12.8 oz (131.9 kg)   SpO2 97%   BMI 38.37 kg/m²     Intake/Output Summary (Last 24 hours) at 2/15/2023 1314  Last data filed at 2/15/2023 1022  Gross per 24 hour   Intake 480 ml   Output 3775 ml   Net -3295 ml       TELEMETRY: Sinus     Physical Exam:  General:  Awake, alert, oriented x 3, NAD  Skin:  Warm and dry  Neck:  JVD none  Chest:  normal air entry  Cardiovascular:  RRR S1S2, no S3, no murmurs  Abdomen:  Soft, ND, NT, No HSM  Extremities:  1+ edema    Medications:    insulin glargine  15 Units SubCUTAneous BID    [Held by provider] furosemide  80 mg IntraVENous TID    sodium chloride flush  10 mL IntraVENous 2 times per day    enoxaparin  30 mg SubCUTAneous BID    aspirin  325 mg Oral Daily    atorvastatin  40 mg Oral Daily    tamsulosin  0.8 mg Oral Nightly    insulin lispro  0-4 Units SubCUTAneous TID WC    insulin lispro  0-4 Units SubCUTAneous Nightly    carvedilol  3.125 mg Oral BID WC      sodium chloride      dextrose       sodium chloride flush, sodium chloride, ondansetron **OR** ondansetron, senna, acetaminophen **OR** acetaminophen, potassium chloride **OR** potassium alternative oral replacement **OR** potassium chloride, magnesium sulfate, dextrose bolus **OR** dextrose bolus, glucagon (rDNA), dextrose, perflutren lipid microspheres    Lab Data:  CBC:   Recent Labs     02/15/23  0608   WBC 6.6   HGB 11.6*   HCT 35.6*   MCV 92.0        BMP:   Recent Labs     02/13/23  0949 02/14/23  0512 02/15/23  0608   * 139 139   K 3.8 3.9 3.8    102 101   CO2 29 26 27   BUN 56* 59* 58*   CREATININE 1.8* 2.0* 1.9*     LIVER PROFILE: No results for input(s): AST, ALT, LIPASE, BILIDIR, BILITOT, ALKPHOS in the last 72 hours. Invalid input(s): AMYLASE,  ALB  PT/INR: No results for input(s): PROTIME, INR in the last 72 hours. APTT: No results for input(s): APTT in the last 72 hours.   BNP:    Lab Results   Component Value Date/Time    PROBNP 4,489 02/13/2023 09:49 AM    PROBNP 5,672 02/10/2023 04:50 AM    PROBNP 7,442 02/09/2023 05:46 AM       TROP:   Troponin   Date/Time Value Ref Range Status   02/08/2023 06:03 AM 0.46 (H) <0.01 ng/mL Final     Comment:     Methodology by Troponin T         LIPIDS:   Lab Results   Component Value Date/Time    CHOL 102 02/08/2023 06:03 AM    TRIG 69 02/08/2023 06:03 AM    HDL 43 02/08/2023 06:03 AM    LDLCALC 45 02/08/2023 06:03 AM    LABVLDL 14 02/08/2023 06:03 AM     Fawn Page MD, MD 2/15/2023 1:14 PM

## 2023-02-15 NOTE — PROGRESS NOTES
Nephrology  Note                                                                                                                                                                                                                                                                                                                                                               Office : [phone]     Fax :[phone]              Patient's Name: Felicia Mcdonald  10:02 AM  2/15/2023          Good UO on lasix   Creatinine level increased                       SOB better      I/O last 3 completed shifts: In: 8522 [P.O.:2220]  Out: 6900 [Urine:6900]  No intake/output data recorded.           Allergies:  Latex    Current Medications:    insulin glargine (LANTUS) injection vial 15 Units, BID  furosemide (LASIX) injection 80 mg, TID  sodium chloride flush 0.9 % injection 10 mL, 2 times per day  sodium chloride flush 0.9 % injection 10 mL, PRN  0.9 % sodium chloride infusion, PRN  ondansetron (ZOFRAN-ODT) disintegrating tablet 4 mg, Q8H PRN   Or  ondansetron (ZOFRAN) injection 4 mg, Q6H PRN  senna (SENOKOT) tablet 8.6 mg, Daily PRN  acetaminophen (TYLENOL) tablet 650 mg, Q6H PRN   Or  acetaminophen (TYLENOL) suppository 650 mg, Q6H PRN  enoxaparin Sodium (LOVENOX) injection 30 mg, BID  potassium chloride (KLOR-CON M) extended release tablet 40 mEq, PRN   Or  potassium bicarb-citric acid (EFFER-K) effervescent tablet 40 mEq, PRN   Or  potassium chloride 10 mEq/100 mL IVPB (Peripheral Line), PRN  magnesium sulfate 2000 mg in 50 mL IVPB premix, PRN  aspirin tablet 325 mg, Daily  atorvastatin (LIPITOR) tablet 40 mg, Daily  tamsulosin (FLOMAX) capsule 0.8 mg, Nightly  insulin lispro (HUMALOG) injection vial 0-4 Units, TID WC  insulin lispro (HUMALOG) injection vial 0-4 Units, Nightly  dextrose bolus 10% 125 mL, PRN   Or  dextrose bolus 10% 250 mL, PRN  glucagon (rDNA) injection 1 mg, PRN  dextrose 10 % infusion, Continuous PRN  perflutren lipid microspheres (DEFINITY) injection 1.5 mL, ONCE PRN  carvedilol (COREG) tablet 3.125 mg, BID WC      Review of Systems:   14 point ROS obtained but were negative except mentioned in HPI      Physical exam:     Vitals:  BP (!) 113/50   Pulse 70   Temp 98.4 °F (36.9 °C) (Oral)   Resp 16   Ht 6' 1\" (1.854 m)   Wt 290 lb 12.8 oz (131.9 kg)   SpO2 99%   BMI 38.37 kg/m²   Constitutional:  OAA X3 NAD  Skin: no rash, turgor wnl  Heent:  eomi, mmm  Neck: no bruits or jvd noted  Cardiovascular:  S1, S2 without m/r/g  Respiratory: CTA B without w/r/r  Abdomen:  +bs, soft, nt, nd  Ext: + lower extremity edema  Psychiatric: mood and affect appropriate  Musculoskeletal:  Rom, muscular strength intact    Data:   Labs:  CBC:   Recent Labs     02/15/23  0608   WBC 6.6   HGB 11.6*        BMP:    Recent Labs     02/13/23  0949 02/14/23  0512 02/15/23  0608   * 139 139   K 3.8 3.9 3.8    102 101   CO2 29 26 27   BUN 56* 59* 58*   CREATININE 1.8* 2.0* 1.9*   GLUCOSE 196* 52* 85     Ca/Mg/Phos:   Recent Labs     02/13/23  0949 02/14/23  0512 02/15/23  0608   CALCIUM 8.6 8.5 8.9     Hepatic:   No results for input(s): AST, ALT, ALB, BILITOT, ALKPHOS in the last 72 hours. Troponin:   No results for input(s): TROPONINI in the last 72 hours. BNP: No results for input(s): BNP in the last 72 hours. Lipids:   No results for input(s): CHOL, TRIG, HDL, LDLCALC, LABVLDL in the last 72 hours. ABGs: No results for input(s): PHART, PO2ART, RVO0LZW in the last 72 hours. INR:   No results for input(s): INR in the last 72 hours. UA:No results for input(s): Natalya [place], GLUCOSEU, BILIRUBINUR, Jaime Last, BLOODU, PHUR, PROTEINU, UROBILINOGEN, NITRU, LEUKOCYTESUR, LABMICR, URINETYPE in the last 72 hours. Urine Microscopic: No results for input(s): LABCAST, BACTERIA, COMU, HYALCAST, WBCUA, RBCUA, EPIU in the last 72 hours. Urine Culture: No results for input(s): LABURIN in the last 72 hours.   Urine Chemistry:   Recent Labs     02/12/23  1050   LABCREA 139.8   PROTEINUR 14.00*             IMAGING:  XR CHEST PORTABLE   Final Result   Mild cardiomegaly with findings of pulmonary edema. Assessment/Plan   CKD 3     2. HTN    3. Anemia    4. Acid- base/ Electrolyte imbalance     5. CHF     Plan   - will hold lasix today  - has plans for cardiac  cath once creatinine stable .  baseline creat 1.6   - daily weights   - monitor UO and renal function   - labs in am       Recommend to dose adjust all medications  based on renal functions  Maintain SBP> 90 mmHg   Daily weights   AVOID NSAIDs  Avoid Nephrotoxins  Monitor Intake/Output  Call if significant decrease in urine output                   Thank you for allowing us to participate in care of Kit Ely MD  Feel free to contact me   [facility] [id number]  Office : [phone]  Fax :[phone]

## 2023-02-15 NOTE — PROGRESS NOTES
Physician Progress Note      PATIENT:               Pasha Carreon  CSN #:                  [id number]  :                       1945  ADMIT DATE:       2023 4:12 AM  DISCH DATE:  Macy Duarte  PROVIDER #:        Garrison Epley TEXT:    Patient admitted with CHF. Noted documentation of Acute Kidney Injury in PN   by Cardiology. In order to support the diagnosis of RAY, please include   additional clinical indicators in your documentation. ? Or please document if   the diagnosis of RAY has been ruled out after further study. The medical record reflects the following:  Risk Factors: HTN, CAD, CHF, CKD3. Clinical Indicators: PN -RAY on CKD, Lab values Creatinine 2.0H ,   Creatinine 1.6 on presentation, unclear of baseline, stabilized at 1.8-1.9. Documentation per attending  of  CKD - cr stable today 2. Treatment: Daily labs; Nephrology consulted    Defined by Kidney Disease Improving Global Outcomes (KDIGO) clinical practice   guideline for acute kidney injury:  -Increase in SCr by greater than or equal to 0.3 mg/dl within 48 hours; or  -Increase or decrease in SCr to greater than or equal to 1.5 times baseline,   which is known or presumed to have occurred within the prior 7 days; or  -Urine volume < 0.5ml/kg/h for 6 hours. Options provided:  -- Acute kidney injury evidenced by, Please document evidence as well as a   numerical baseline creatinine, if known. -- Acute kidney injury ruled out after study patient having only CKD3  -- Other - I will add my own diagnosis  -- Disagree - Not applicable / Not valid  -- Disagree - Clinically unable to determine / Unknown  -- Refer to Clinical Documentation Reviewer    PROVIDER RESPONSE TEXT:    This patient has acute kidney injury as evidenced by Baseline creatinne 1.6,   creatinine 2.0 yesterday    Query created by: Ari Grace on 2/15/2023 8:11 AM      Electronically signed by:   Severo Malling KAY X 2/15/2023 1:15 PM

## 2023-02-15 NOTE — PROGRESS NOTES
[facility] PROGRESS NOTE    2/15/2023 8:21 AM        Name: Curt Tyler . Admitted: 2/8/2023  Primary Care Provider: No primary care provider on file. (Tel: None)      Subjective: Kody Roman is a 69 y.o. male with a past medical history of hypertension, hyperlipidemia, DM2, DKD3, and CAD s/p remote CABG who presented from [facility] with chest pressure and SOB with accompanying edema. He has had poor cardiac follow up in the past.  He was started on lasix by PCP without improvement. Troponin 0.45 in ER an he was transferred to [facility] for NSTEMI and acute CHF. Interval History: Today, he is feeling better. Denies SOB today, no orthopnea. Cr 1.9 today from 2 yesterday. On IV lasix 80 mg TID. Down 13 L. Weight down 18 lbs this admission. No CP. Denies urinary complaints. No N/V/C. Independently reviewed interval ancillary notes from cardiology. Problem List  Principal Problem:    Acute systolic congestive heart failure (HCC)  Active Problems:    Coronary artery disease due to calcified coronary lesion    Stage 3 chronic kidney disease (HCC)    Type 2 diabetes mellitus with stage 3 chronic kidney disease, without long-term current use of insulin (HCC)    Edema    Hyperlipidemia    Iron deficiency anemia    Chronic systolic (congestive) heart failure (HCC)    Electrolyte imbalance    Primary hypertension    Cardiomyopathy ([address]  Resolved Problems:    * No resolved hospital problems.  *     Assessment and Plan:    Acute Combined CHF   - Appears near euvolemic   - Echo with EF 25-30% with GIIDD   - On IV lasix 80 mg TID, appreciate nephro recs for lasix dosing  Cardiomyopathy   - New problem, EF 25-30% with global hypokinesis more pronounced in the inferior and septal walls   - Plans for ischemic evaluation per cards  CKD 3   - cr stable today 2   - nephrology following Dr. Eloise Ashton, continue IV diuresis  NSTEMI/CAD   - Remote hx of CABG   - LHC when renal fx stable, NPO today for possible LHC  DM2    - A1C 6.8   - On Levemir, actos, and nateglinide at home    - Reviewed BG and recurrent hypoglycemia with am to 52; Reduce lantus to 15 units bid    - No Actos at discharge as can contribute to LE swelling  Hypertension   - Controlled, Norvasc on hold for soft BP  Hyperlipidemia   - LDL 45, continue with statin   Normocytic Anemia    - Hgb 11.6 with low iron, receiving IV Venofer x5   - Needs OP colonoscopy screening   - CBC stable    - NPO for possible LHC, discussed with cardiology coordinator    Discussed care with patient and nursing  Discussed case, assessment and plan of care with Dr. Olga Sadler    Diet: Diet NPO Exceptions are: Sips of Water with Meds  Code:Full Code  DVT PPX: Lovenox PPx    Disposition: Home without needs when medically able, likely needs 2 additional days for diuresis and further testing    Current Medications  insulin glargine (LANTUS) injection vial 15 Units, BID  furosemide (LASIX) injection 80 mg, TID  sodium chloride flush 0.9 % injection 10 mL, 2 times per day  sodium chloride flush 0.9 % injection 10 mL, PRN  0.9 % sodium chloride infusion, PRN  ondansetron (ZOFRAN-ODT) disintegrating tablet 4 mg, Q8H PRN   Or  ondansetron (ZOFRAN) injection 4 mg, Q6H PRN  senna (SENOKOT) tablet 8.6 mg, Daily PRN  acetaminophen (TYLENOL) tablet 650 mg, Q6H PRN   Or  acetaminophen (TYLENOL) suppository 650 mg, Q6H PRN  enoxaparin Sodium (LOVENOX) injection 30 mg, BID  potassium chloride (KLOR-CON M) extended release tablet 40 mEq, PRN   Or  potassium bicarb-citric acid (EFFER-K) effervescent tablet 40 mEq, PRN   Or  potassium chloride 10 mEq/100 mL IVPB (Peripheral Line), PRN  magnesium sulfate 2000 mg in 50 mL IVPB premix, PRN  aspirin tablet 325 mg, Daily  atorvastatin (LIPITOR) tablet 40 mg, Daily  tamsulosin (FLOMAX) capsule 0.8 mg, Nightly  insulin lispro (HUMALOG) injection vial 0-4 Units, TID WC  insulin lispro (HUMALOG) injection vial 0-4 Units, Nightly  dextrose bolus 10% 125 mL, PRN   Or  dextrose bolus 10% 250 mL, PRN  glucagon (rDNA) injection 1 mg, PRN  dextrose 10 % infusion, Continuous PRN  perflutren lipid microspheres (DEFINITY) injection 1.5 mL, ONCE PRN  carvedilol (COREG) tablet 3.125 mg, BID WC      Objective:  BP (!) 113/50   Pulse 70   Temp 98.4 °F (36.9 °C) (Oral)   Resp 16   Ht 6' 1\" (1.854 m)   Wt 290 lb 12.8 oz (131.9 kg)   SpO2 99%   BMI 38.37 kg/m²   Vitals:    02/15/23 0445   BP: (!) 113/50   Pulse: 70   Resp: 16   Temp:    SpO2:        Intake/Output Summary (Last 24 hours) at 2/15/2023 0680  Last data filed at 2/15/2023 0456  Gross per 24 hour   Intake 1200 ml   Output 4225 ml   Net -3025 ml        Wt Readings from Last 3 Encounters:   02/15/23 290 lb 12.8 oz (131.9 kg)       Review of Systems:  Constitutional: No fever, Yes sudden weight changes, No weakness  Skin: No excessive bruising, No bleeding, No changes in skin pigment, normal turgor  Respiratory: Yes SOB, No cough, No wheezing, No recent URI  Cardiovascular: Negative for CP, palpitations, dizziness, or syncope  Gastrointestinal: No abdominal pain, No nausea, No vomiting, No diarrhea, No constipation, No black/tarry stools  Genito-Urinary: No hematuria, No dysuria, No polyuria,   Musculoskeletal: No pain, No swelling, No new mobility concerns  Endocrine: No excessive sweating   Neurological/Psych: No for confusion, No TIA-like symptoms. Denies any acute depression, anxiety, or insomnia. Physical Examination:  Telemetry: Personally Reviewed Normal sinus rhythm, IVCD, PVCs occasional  Constitutional: Cooperative and in no apparent distress, appears well nourished, Yes obesity  Skin: Warm and pink; no cyanosis, bruising, or clubbing, No lesions/incisions, BLE enzo   HEENT: Symmetric and normocephalic. Conjunctiva pink with clear sclera.  Mucus membranes pink and moist.   Cardiovascular: regular rate and rhythm. S1 & S2, negative for murmurs. Peripheral pulses 2+, Yes peripheral edema 1+  Respiratory: Respirations symmetric and unlabored. Lungs clear to auscultation bilaterally, no wheezing, crackles, or rhonchi + diminished BLL  Gastrointestinal: Abdomen soft and round. normal bowel sounds. No tenderness  Musculoskeletal: No focal weakness, muscle strength 5/5 bilaterally  Neurologic/Psych: Awake and orientated to person, place and time. Calm affect, appropriate mood. Pertinent labs, diagnostic, and imaging results reviewed as a part of this visit    Labs and Tests:  CBC:   Recent Labs     02/15/23  0608   WBC 6.6   HGB 11.6*          BMP:    Recent Labs     02/13/23  0949 02/14/23  0512 02/15/23  0608   * 139 139   K 3.8 3.9 3.8    102 101   CO2 29 26 27   BUN 56* 59* 58*   CREATININE 1.8* 2.0* 1.9*   GLUCOSE 196* 52* 85       Hepatic: No results for input(s): AST, ALT, ALB, BILITOT, ALKPHOS in the last 72 hours. Relevant results:  CXR 2/8/2023:  Mild cardiomegaly with findings of pulmonary edema. Echo 2/9/2023:  Summary   Left ventricular size is moderately increased. Left ventricular systolic function is severely depressed with ejection   fraction estimated at 25-30%. Global hypokinesis with hypokinesis more pronounced in the inferior and septal walls. There is mild concentric left ventricular hypertrophy. Grade II diastolic dysfunction with elevated LV filling pressures. Mild to moderate mitral regurgitation. The left atrium is mildly dilated. Moderate tricuspid regurgitation. Systolic pulmonary artery pressure (SPAP) is normal and estimated at 30 mmHg (right atrial pressure 8 mmHg).     Yoel Juarez, ZION - CNP   2/15/2023 8:21 AM

## 2023-02-15 NOTE — PROGRESS NOTES
Awake and up in chair. 2 am blood glucose result 150. Feels slightly off at this time. Blood sugar is 91. Explained hypoglycemia treatment. Since he will remain NPO instructed to call if symptoms worsens.

## 2023-02-15 NOTE — PLAN OF CARE
Hold off on angiogram until Friday. Waiting for kidneys to improve. Blood thinners held this am anticipating procedure. Diuretics and bp med held d/t low bp and labs.    Problem: Discharge Planning  Goal: Discharge to home or other facility with appropriate resources  Outcome: Progressing     Problem: Safety - Adult  Goal: Free from fall injury  Outcome: Progressing     Problem: ABCDS Injury Assessment  Goal: Absence of physical injury  Outcome: Progressing     Problem: Respiratory - Adult  Goal: Achieves optimal ventilation and oxygenation  Outcome: Progressing     Problem: Cardiovascular - Adult  Goal: Maintains optimal cardiac output and hemodynamic stability  Outcome: Progressing     Problem: Skin/Tissue Integrity - Adult  Goal: Skin integrity remains intact  Outcome: Progressing     Problem: Musculoskeletal - Adult  Goal: Return mobility to safest level of function  Outcome: Progressing     Problem: Gastrointestinal - Adult  Goal: Maintains or returns to baseline bowel function  Outcome: Progressing     Problem: Genitourinary - Adult  Goal: Absence of urinary retention  Outcome: Progressing     Problem: Metabolic/Fluid and Electrolytes - Adult  Goal: Electrolytes maintained within normal limits  Outcome: Progressing     Problem: Pain  Goal: Verbalizes/displays adequate comfort level or baseline comfort level  Outcome: Progressing

## 2023-02-16 LAB
ANION GAP SERPL CALCULATED.3IONS-SCNC: 7 MMOL/L (ref 3–16)
BUN BLDV-MCNC: 55 MG/DL (ref 7–20)
CALCIUM SERPL-MCNC: 8.5 MG/DL (ref 8.3–10.6)
CHLORIDE BLD-SCNC: 103 MMOL/L (ref 99–110)
CO2: 28 MMOL/L (ref 21–32)
CREAT SERPL-MCNC: 1.7 MG/DL (ref 0.8–1.3)
GFR SERPL CREATININE-BSD FRML MDRD: 41 ML/MIN/{1.73_M2}
GLUCOSE BLD-MCNC: 152 MG/DL (ref 70–99)
GLUCOSE BLD-MCNC: 177 MG/DL (ref 70–99)
GLUCOSE BLD-MCNC: 230 MG/DL (ref 70–99)
GLUCOSE BLD-MCNC: 234 MG/DL (ref 70–99)
GLUCOSE BLD-MCNC: 249 MG/DL (ref 70–99)
PERFORMED ON: ABNORMAL
POTASSIUM SERPL-SCNC: 4.1 MMOL/L (ref 3.5–5.1)
SODIUM BLD-SCNC: 138 MMOL/L (ref 136–145)

## 2023-02-16 PROCEDURE — 2060000000 HC ICU INTERMEDIATE R&B

## 2023-02-16 PROCEDURE — 99233 SBSQ HOSP IP/OBS HIGH 50: CPT | Performed by: INTERNAL MEDICINE

## 2023-02-16 PROCEDURE — 6360000002 HC RX W HCPCS: Performed by: INTERNAL MEDICINE

## 2023-02-16 PROCEDURE — 6370000000 HC RX 637 (ALT 250 FOR IP): Performed by: NURSE PRACTITIONER

## 2023-02-16 PROCEDURE — 6370000000 HC RX 637 (ALT 250 FOR IP): Performed by: INTERNAL MEDICINE

## 2023-02-16 PROCEDURE — 80048 BASIC METABOLIC PNL TOTAL CA: CPT

## 2023-02-16 PROCEDURE — 2580000003 HC RX 258: Performed by: INTERNAL MEDICINE

## 2023-02-16 PROCEDURE — 36415 COLL VENOUS BLD VENIPUNCTURE: CPT

## 2023-02-16 RX ADMIN — TAMSULOSIN HYDROCHLORIDE 0.8 MG: 0.4 CAPSULE ORAL at 20:19

## 2023-02-16 RX ADMIN — INSULIN LISPRO 1 UNITS: 100 INJECTION, SOLUTION INTRAVENOUS; SUBCUTANEOUS at 16:57

## 2023-02-16 RX ADMIN — INSULIN GLARGINE 15 UNITS: 100 INJECTION, SOLUTION SUBCUTANEOUS at 08:19

## 2023-02-16 RX ADMIN — ASPIRIN 325 MG: 325 TABLET ORAL at 08:19

## 2023-02-16 RX ADMIN — CARVEDILOL 3.12 MG: 3.12 TABLET, FILM COATED ORAL at 08:19

## 2023-02-16 RX ADMIN — INSULIN LISPRO 1 UNITS: 100 INJECTION, SOLUTION INTRAVENOUS; SUBCUTANEOUS at 12:03

## 2023-02-16 RX ADMIN — INSULIN GLARGINE 15 UNITS: 100 INJECTION, SOLUTION SUBCUTANEOUS at 20:24

## 2023-02-16 RX ADMIN — ENOXAPARIN SODIUM 30 MG: 100 INJECTION SUBCUTANEOUS at 09:15

## 2023-02-16 RX ADMIN — CARVEDILOL 3.12 MG: 3.12 TABLET, FILM COATED ORAL at 17:03

## 2023-02-16 RX ADMIN — ENOXAPARIN SODIUM 30 MG: 100 INJECTION SUBCUTANEOUS at 20:19

## 2023-02-16 RX ADMIN — SENNOSIDES 8.6 MG: 8.6 TABLET, FILM COATED ORAL at 08:27

## 2023-02-16 RX ADMIN — Medication 10 ML: at 08:23

## 2023-02-16 RX ADMIN — ATORVASTATIN CALCIUM 40 MG: 40 TABLET, FILM COATED ORAL at 08:19

## 2023-02-16 NOTE — PROGRESS NOTES
[facility]     Daily Progress Note      Admit Date:  2/8/2023        ASSESSMENT AND PLAN:  Acute systolic heart failure  Ischemic cardiomyopathy  HTN  CAD in native artery s/p CABG  Elevated troponin, unsure if type 1 versus type 2 NSTEMI  RAY on CKD (baseline creatinine 1.6, was 2.0 yesterday, 1.9 today)     Plan:     -Cath hopefully tomorrow   Ideally would get Cr < 1.6   After discussion with Dr Fishman, ok to cath tomorrow even if Cr still = 1.7    -Ischemic Cardiomyopathy: New  ~Echo:  EF: 25-30%   Core measures for HF:  ACEi/ARB/ARNI: add entresto after diuresis and after IV dye load from cath (possibly start tomorrow)  BB: Carvedilol  Brian:  will add prior to discharge  Hydralazine/nitrates:  SGLT2i:  will add jardiance prior to d/c  ~Device: none  ~Plan: needs ischemic eval as noted above     Subjective:  Mr. Lima feels well and states his leg swelling has actually been improving since stopping the lasix.  No chest pain or dyspnea.    Objective:   BP (!) 118/52   Pulse 66   Temp 97.8 °F (36.6 °C) (Oral)   Resp 18   Ht 6' 1\" (1.854 m)   Wt 290 lb 4.8 oz (131.7 kg)   SpO2 96%   BMI 38.30 kg/m²     Intake/Output Summary (Last 24 hours) at 2/16/2023 1451  Last data filed at 2/16/2023 1147  Gross per 24 hour   Intake 1200 ml   Output 2000 ml   Net -800 ml       TELEMETRY: Sinus     Physical Exam:  General:  Awake, alert, oriented x 3, NAD  Skin:  Warm and dry  Neck:  JVD none  Chest:  normal air entry  Cardiovascular:  RRR S1S2, no S3, no murmur  Abdomen:  Soft, ND, NT, No HSM  Extremities:  No edema    Medications:    insulin glargine  15 Units SubCUTAneous BID    sodium chloride flush  10 mL IntraVENous 2 times per day    enoxaparin  30 mg SubCUTAneous BID    aspirin  325 mg Oral Daily    atorvastatin  40 mg Oral Daily    tamsulosin  0.8 mg Oral Nightly    insulin lispro  0-4 Units SubCUTAneous TID WC    insulin lispro  0-4 Units SubCUTAneous Nightly    carvedilol  3.125 mg Oral BID WC       sodium chloride      dextrose       sodium chloride flush, sodium chloride, ondansetron **OR** ondansetron, senna, acetaminophen **OR** acetaminophen, potassium chloride **OR** potassium alternative oral replacement **OR** potassium chloride, magnesium sulfate, dextrose bolus **OR** dextrose bolus, glucagon (rDNA), dextrose, perflutren lipid microspheres    Lab Data:  CBC:   Recent Labs     02/15/23  0608   WBC 6.6   HGB 11.6*   HCT 35.6*   MCV 92.0        BMP:   Recent Labs     02/14/23  0512 02/15/23  0608 02/16/23  0544    139 138   K 3.9 3.8 4.1    101 103   CO2 26 27 28   BUN 59* 58* 55*   CREATININE 2.0* 1.9* 1.7*     LIVER PROFILE: No results for input(s): AST, ALT, LIPASE, BILIDIR, BILITOT, ALKPHOS in the last 72 hours. Invalid input(s): AMYLASE,  ALB  PT/INR: No results for input(s): PROTIME, INR in the last 72 hours. APTT: No results for input(s): APTT in the last 72 hours.   BNP:    Lab Results   Component Value Date/Time    PROBNP 4,489 02/13/2023 09:49 AM    PROBNP 5,672 02/10/2023 04:50 AM    PROBNP 7,442 02/09/2023 05:46 AM       TROP:   Troponin   Date/Time Value Ref Range Status   02/08/2023 06:03 AM 0.46 (H) <0.01 ng/mL Final     Comment:     Methodology by Troponin T         LIPIDS:   Lab Results   Component Value Date/Time    CHOL 102 02/08/2023 06:03 AM    TRIG 69 02/08/2023 06:03 AM    HDL 43 02/08/2023 06:03 AM    LDLCALC 45 02/08/2023 06:03 AM    LABVLDL 14 02/08/2023 06:03 AM       nAjali Coronel MD, MD 2/16/2023 2:51 PM

## 2023-02-16 NOTE — PROGRESS NOTES
Report received from day shift RN. Patient resting comfortably in bed. No signs of discomfort or distress. Bed is in lowest position, wheels locked, 2/2 side rails up. Bedside table and call light within reach. White board updated. Will continue to monitor patient. Windy No RN    9:56 PM  Shift assessment complete. (See findings in flowsheet). Med pass complete. (See MAR). VSS. Patient with no complaints at this time.  Windy No RN

## 2023-02-16 NOTE — PROGRESS NOTES
Nephrology  Note                                                                                                                                                                                                                                                                                                                                                               Office : [phone]     Fax :[phone]              Patient's Name: Sharyn Vela  9:15 AM  2/16/2023          Good UOP on lasix   Creatinine level increased                       SOB better      I/O last 3 completed shifts:   In: 12 [P.O.:960]  Out: 3875 [Urine:3875]  I/O this shift:  In: -   Out: 900 [Urine:900]          Allergies:  Latex    Current Medications:    insulin glargine (LANTUS) injection vial 15 Units, BID  [Held by provider] furosemide (LASIX) injection 80 mg, TID  sodium chloride flush 0.9 % injection 10 mL, 2 times per day  sodium chloride flush 0.9 % injection 10 mL, PRN  0.9 % sodium chloride infusion, PRN  ondansetron (ZOFRAN-ODT) disintegrating tablet 4 mg, Q8H PRN   Or  ondansetron (ZOFRAN) injection 4 mg, Q6H PRN  senna (SENOKOT) tablet 8.6 mg, Daily PRN  acetaminophen (TYLENOL) tablet 650 mg, Q6H PRN   Or  acetaminophen (TYLENOL) suppository 650 mg, Q6H PRN  enoxaparin Sodium (LOVENOX) injection 30 mg, BID  potassium chloride (KLOR-CON M) extended release tablet 40 mEq, PRN   Or  potassium bicarb-citric acid (EFFER-K) effervescent tablet 40 mEq, PRN   Or  potassium chloride 10 mEq/100 mL IVPB (Peripheral Line), PRN  magnesium sulfate 2000 mg in 50 mL IVPB premix, PRN  aspirin tablet 325 mg, Daily  atorvastatin (LIPITOR) tablet 40 mg, Daily  tamsulosin (FLOMAX) capsule 0.8 mg, Nightly  insulin lispro (HUMALOG) injection vial 0-4 Units, TID WC  insulin lispro (HUMALOG) injection vial 0-4 Units, Nightly  dextrose bolus 10% 125 mL, PRN   Or  dextrose bolus 10% 250 mL, PRN  glucagon (rDNA) injection 1 mg, PRN  dextrose 10 % infusion, Continuous PRN  perflutren lipid microspheres (DEFINITY) injection 1.5 mL, ONCE PRN  carvedilol (COREG) tablet 3.125 mg, BID WC        Physical exam:     Vitals:  /78   Pulse 74   Temp 97.5 °F (36.4 °C) (Oral)   Resp 18   Ht 6' 1\" (1.854 m)   Wt 290 lb 4.8 oz (131.7 kg)   SpO2 97%   BMI 38.30 kg/m²   Constitutional:  OAA X3 NAD  Skin: no rash, turgor wnl  Heent:  eomi, mmm  Neck: no bruits or jvd noted  Cardiovascular:  S1, S2 without m/r/g  Respiratory: CTA B without w/r/r  Abdomen:  +bs, soft, nt, nd  Ext: + lower extremity edema  Psychiatric: mood and affect appropriate  Musculoskeletal:  Rom, muscular strength intact    Data:   Labs:  CBC:   Recent Labs     02/15/23  0608   WBC 6.6   HGB 11.6*        BMP:    Recent Labs     02/14/23  0512 02/15/23  0608 02/16/23  0544    139 138   K 3.9 3.8 4.1    101 103   CO2 26 27 28   BUN 59* 58* 55*   CREATININE 2.0* 1.9* 1.7*   GLUCOSE 52* 85 177*     Ca/Mg/Phos:   Recent Labs     02/14/23  0512 02/15/23  0608 02/16/23  0544   CALCIUM 8.5 8.9 8.5     Hepatic:   No results for input(s): AST, ALT, ALB, BILITOT, ALKPHOS in the last 72 hours. Troponin:   No results for input(s): TROPONINI in the last 72 hours. BNP: No results for input(s): BNP in the last 72 hours. Lipids:   No results for input(s): CHOL, TRIG, HDL, LDLCALC, LABVLDL in the last 72 hours. ABGs: No results for input(s): PHART, PO2ART, FXP5XRK in the last 72 hours. INR:   No results for input(s): INR in the last 72 hours. UA:No results for input(s): Cade Drop, GLUCOSEU, BILIRUBINUR, Jazmyne Cram, BLOODU, PHUR, PROTEINU, UROBILINOGEN, NITRU, LEUKOCYTESUR, LABMICR, URINETYPE in the last 72 hours. Urine Microscopic: No results for input(s): LABCAST, BACTERIA, COMU, HYALCAST, WBCUA, RBCUA, EPIU in the last 72 hours. Urine Culture: No results for input(s): LABURIN in the last 72 hours.   Urine Chemistry:   No results for input(s): Shawn Randall, Marcelino Cee in the last 72 hours. IMAGING:  XR CHEST PORTABLE   Final Result   Mild cardiomegaly with findings of pulmonary edema. Assessment/Plan   CKD 3     2. HTN    3. Anemia    4. Acid- base/ Electrolyte imbalance     5. CHF     Plan   - will hold lasix today  - has plans for cardiac  cath once creatinine stable . baseline creat 1.6   - daily weights   - monitor UO and renal function   - labs in am     D/w cardiology.  Plan to do [facility] tomorrow   Hold lasix today   Recommend to dose adjust all medications  based on renal functions  Maintain SBP> 90 mmHg   Daily weights   AVOID NSAIDs  Avoid Nephrotoxins  Monitor Intake/Output  Call if significant decrease in urine output                   Thank you for allowing us to participate in care of Vamsi Verdugo MD  Feel free to contact me   [facility] [address]  Office : [phone]  Fax :[phone]

## 2023-02-16 NOTE — PLAN OF CARE
Problem: Discharge Planning  Goal: Discharge to home or other facility with appropriate resources  Outcome: Progressing  Flowsheets (Taken 2/16/2023 0816)  Discharge to home or other facility with appropriate resources:   Identify barriers to discharge with patient and caregiver   Arrange for needed discharge resources and transportation as appropriate   Identify discharge learning needs (meds, wound care, etc)   Refer to discharge planning if patient needs post-hospital services based on physician order or complex needs related to functional status, cognitive ability or social support system     Problem: Safety - Adult  Goal: Free from fall injury  Outcome: Progressing     Problem: ABCDS Injury Assessment  Goal: Absence of physical injury  Outcome: Progressing     Problem: Respiratory - Adult  Goal: Achieves optimal ventilation and oxygenation  Outcome: Progressing  Flowsheets (Taken 2/16/2023 0816)  Achieves optimal ventilation and oxygenation:   Assess for changes in respiratory status   Assess for changes in mentation and behavior   Position to facilitate oxygenation and minimize respiratory effort   Oxygen supplementation based on oxygen saturation or arterial blood gases   Initiate smoking cessation protocol as indicated   Encourage broncho-pulmonary hygiene including cough, deep breathe, incentive spirometry   Assess the need for suctioning and aspirate as needed   Assess and instruct to report shortness of breath or any respiratory difficulty   Respiratory therapy support as indicated     Problem: Cardiovascular - Adult  Goal: Maintains optimal cardiac output and hemodynamic stability  Outcome: Progressing  Flowsheets (Taken 2/16/2023 0816)  Maintains optimal cardiac output and hemodynamic stability:   Monitor blood pressure and heart rate   Monitor urine output and notify Licensed Independent Practitioner for values outside of normal range   Assess for signs of decreased cardiac output   Administer fluid and/or volume expanders as ordered   Administer vasoactive medications as ordered   For PPHN infants, administer sedation as ordered and minimize all controllable stressors.      Problem: Skin/Tissue Integrity - Adult  Goal: Skin integrity remains intact  Outcome: Progressing  Flowsheets (Taken 2/16/2023 0816)  Skin Integrity Remains Intact:   Monitor for areas of redness and/or skin breakdown   Assess vascular access sites hourly   Every 4-6 hours minimum: Change oxygen saturation probe site   Every 4-6 hours: If on nasal continuous positive airway pressure, respiratory therapy assesses nares and determine need for appliance change or resting period     Problem: Musculoskeletal - Adult  Goal: Return mobility to safest level of function  Outcome: Progressing  Flowsheets (Taken 2/16/2023 0816)  Return Mobility to Safest Level of Function:   Assess patient stability and activity tolerance for standing, transferring and ambulating with or without assistive devices   Assist with transfers and ambulation using safe patient handling equipment as needed   Ensure adequate protection for wounds/incisions during mobilization   Obtain physical therapy/occupational therapy consults as needed   Apply continuous passive motion per provider or physical therapy orders to increase flexion toward goal   Instruct patient/family in ordered activity level     Problem: Gastrointestinal - Adult  Goal: Maintains or returns to baseline bowel function  Outcome: Progressing  Flowsheets (Taken 2/16/2023 0816)  Maintains or returns to baseline bowel function:   Assess bowel function   Encourage oral fluids to ensure adequate hydration   Administer IV fluids as ordered to ensure adequate hydration   Administer ordered medications as needed   Encourage mobilization and activity   Nutrition consult to assist patient with appropriate food choices     Problem: Genitourinary - Adult  Goal: Absence of urinary retention  Outcome: Progressing  Flowsheets (Taken 2/16/2023 0816)  Absence of urinary retention:   Monitor intake/output and perform bladder scan as needed   Assess patients ability to void and empty bladder   Place urinary catheter per Licensed Independent Practitioner order if needed   Discuss with Licensed Independent Practitioner  medications to alleviate retention as needed   Discuss catheterization for long term situations as appropriate     Problem: Metabolic/Fluid and Electrolytes - Adult  Goal: Electrolytes maintained within normal limits  Outcome: Progressing  Flowsheets (Taken 2/16/2023 0816)  Electrolytes maintained within normal limits:   Monitor labs and assess patient for signs and symptoms of electrolyte imbalances   Administer electrolyte replacement as ordered   Monitor response to electrolyte replacements, including repeat lab results as appropriate   Fluid restriction as ordered   Instruct patient on fluid and nutrition restrictions as appropriate     Problem: Pain  Goal: Verbalizes/displays adequate comfort level or baseline comfort level  Outcome: Progressing

## 2023-02-17 LAB
ANION GAP SERPL CALCULATED.3IONS-SCNC: 6 MMOL/L (ref 3–16)
BUN BLDV-MCNC: 55 MG/DL (ref 7–20)
CALCIUM SERPL-MCNC: 8.7 MG/DL (ref 8.3–10.6)
CHLORIDE BLD-SCNC: 102 MMOL/L (ref 99–110)
CO2: 29 MMOL/L (ref 21–32)
CREAT SERPL-MCNC: 1.7 MG/DL (ref 0.8–1.3)
GFR SERPL CREATININE-BSD FRML MDRD: 41 ML/MIN/{1.73_M2}
GLUCOSE BLD-MCNC: 107 MG/DL (ref 70–99)
GLUCOSE BLD-MCNC: 139 MG/DL (ref 70–99)
GLUCOSE BLD-MCNC: 153 MG/DL (ref 70–99)
GLUCOSE BLD-MCNC: 163 MG/DL (ref 70–99)
GLUCOSE BLD-MCNC: 167 MG/DL (ref 70–99)
PERFORMED ON: ABNORMAL
POTASSIUM SERPL-SCNC: 4.2 MMOL/L (ref 3.5–5.1)
SODIUM BLD-SCNC: 137 MMOL/L (ref 136–145)

## 2023-02-17 PROCEDURE — C1769 GUIDE WIRE: HCPCS

## 2023-02-17 PROCEDURE — 6370000000 HC RX 637 (ALT 250 FOR IP): Performed by: INTERNAL MEDICINE

## 2023-02-17 PROCEDURE — 2580000003 HC RX 258

## 2023-02-17 PROCEDURE — 2500000003 HC RX 250 WO HCPCS

## 2023-02-17 PROCEDURE — 99152 MOD SED SAME PHYS/QHP 5/>YRS: CPT | Performed by: INTERNAL MEDICINE

## 2023-02-17 PROCEDURE — 4A023N7 MEASUREMENT OF CARDIAC SAMPLING AND PRESSURE, LEFT HEART, PERCUTANEOUS APPROACH: ICD-10-PCS | Performed by: INTERNAL MEDICINE

## 2023-02-17 PROCEDURE — B2131ZZ FLUOROSCOPY OF MULTIPLE CORONARY ARTERY BYPASS GRAFTS USING LOW OSMOLAR CONTRAST: ICD-10-PCS | Performed by: INTERNAL MEDICINE

## 2023-02-17 PROCEDURE — 1200000000 HC SEMI PRIVATE

## 2023-02-17 PROCEDURE — 6360000002 HC RX W HCPCS

## 2023-02-17 PROCEDURE — C1894 INTRO/SHEATH, NON-LASER: HCPCS

## 2023-02-17 PROCEDURE — 6370000000 HC RX 637 (ALT 250 FOR IP): Performed by: NURSE PRACTITIONER

## 2023-02-17 PROCEDURE — 99233 SBSQ HOSP IP/OBS HIGH 50: CPT | Performed by: INTERNAL MEDICINE

## 2023-02-17 PROCEDURE — 94760 N-INVAS EAR/PLS OXIMETRY 1: CPT

## 2023-02-17 PROCEDURE — 36415 COLL VENOUS BLD VENIPUNCTURE: CPT

## 2023-02-17 PROCEDURE — 99152 MOD SED SAME PHYS/QHP 5/>YRS: CPT

## 2023-02-17 PROCEDURE — 99153 MOD SED SAME PHYS/QHP EA: CPT

## 2023-02-17 PROCEDURE — 6360000004 HC RX CONTRAST MEDICATION: Performed by: INTERNAL MEDICINE

## 2023-02-17 PROCEDURE — 2580000003 HC RX 258: Performed by: INTERNAL MEDICINE

## 2023-02-17 PROCEDURE — 2709999900 HC NON-CHARGEABLE SUPPLY

## 2023-02-17 PROCEDURE — 6360000002 HC RX W HCPCS: Performed by: INTERNAL MEDICINE

## 2023-02-17 PROCEDURE — 93459 L HRT ART/GRFT ANGIO: CPT | Performed by: INTERNAL MEDICINE

## 2023-02-17 PROCEDURE — 80048 BASIC METABOLIC PNL TOTAL CA: CPT

## 2023-02-17 PROCEDURE — 93458 L HRT ARTERY/VENTRICLE ANGIO: CPT

## 2023-02-17 PROCEDURE — C1760 CLOSURE DEV, VASC: HCPCS

## 2023-02-17 RX ORDER — SODIUM CHLORIDE 0.9 % (FLUSH) 0.9 %
5-40 SYRINGE (ML) INJECTION EVERY 12 HOURS SCHEDULED
Status: DISCONTINUED | OUTPATIENT
Start: 2023-02-17 | End: 2023-02-21 | Stop reason: HOSPADM

## 2023-02-17 RX ORDER — ASPIRIN 81 MG/1
81 TABLET ORAL DAILY
Status: DISCONTINUED | OUTPATIENT
Start: 2023-02-17 | End: 2023-02-21 | Stop reason: HOSPADM

## 2023-02-17 RX ORDER — SODIUM CHLORIDE 9 MG/ML
INJECTION, SOLUTION INTRAVENOUS PRN
Status: DISCONTINUED | OUTPATIENT
Start: 2023-02-17 | End: 2023-02-21 | Stop reason: HOSPADM

## 2023-02-17 RX ORDER — ACETAMINOPHEN 325 MG/1
650 TABLET ORAL EVERY 4 HOURS PRN
Status: DISCONTINUED | OUTPATIENT
Start: 2023-02-17 | End: 2023-02-21 | Stop reason: HOSPADM

## 2023-02-17 RX ORDER — SODIUM CHLORIDE 9 MG/ML
INJECTION, SOLUTION INTRAVENOUS CONTINUOUS
Status: DISCONTINUED | OUTPATIENT
Start: 2023-02-17 | End: 2023-02-17

## 2023-02-17 RX ORDER — CLOPIDOGREL BISULFATE 75 MG/1
75 TABLET ORAL DAILY
Status: DISCONTINUED | OUTPATIENT
Start: 2023-02-18 | End: 2023-02-21 | Stop reason: HOSPADM

## 2023-02-17 RX ORDER — SODIUM CHLORIDE 0.9 % (FLUSH) 0.9 %
5-40 SYRINGE (ML) INJECTION PRN
Status: DISCONTINUED | OUTPATIENT
Start: 2023-02-17 | End: 2023-02-21 | Stop reason: HOSPADM

## 2023-02-17 RX ADMIN — INSULIN GLARGINE 15 UNITS: 100 INJECTION, SOLUTION SUBCUTANEOUS at 08:30

## 2023-02-17 RX ADMIN — CARVEDILOL 3.12 MG: 3.12 TABLET, FILM COATED ORAL at 08:29

## 2023-02-17 RX ADMIN — Medication 10 ML: at 08:32

## 2023-02-17 RX ADMIN — ATORVASTATIN CALCIUM 40 MG: 40 TABLET, FILM COATED ORAL at 08:29

## 2023-02-17 RX ADMIN — TAMSULOSIN HYDROCHLORIDE 0.8 MG: 0.4 CAPSULE ORAL at 20:55

## 2023-02-17 RX ADMIN — ASPIRIN 325 MG: 325 TABLET ORAL at 08:29

## 2023-02-17 RX ADMIN — IOPAMIDOL 78 ML: 755 INJECTION, SOLUTION INTRAVENOUS at 16:49

## 2023-02-17 RX ADMIN — ASPIRIN 81 MG: 81 TABLET, COATED ORAL at 20:59

## 2023-02-17 RX ADMIN — INSULIN GLARGINE 15 UNITS: 100 INJECTION, SOLUTION SUBCUTANEOUS at 20:55

## 2023-02-17 RX ADMIN — Medication 10 ML: at 21:01

## 2023-02-17 RX ADMIN — ENOXAPARIN SODIUM 30 MG: 100 INJECTION SUBCUTANEOUS at 20:55

## 2023-02-17 RX ADMIN — CARVEDILOL 3.12 MG: 3.12 TABLET, FILM COATED ORAL at 20:59

## 2023-02-17 ASSESSMENT — PAIN SCALES - GENERAL: PAINLEVEL_OUTOF10: 0

## 2023-02-17 NOTE — BRIEF OP NOTE
Cardiac Cath 2/17/2023:  Access: Right CFA   Hemostasis: Angioseal closure device  Note: High access point above inferior epigastric artery which had a low takeoff.    Moderate Sedation:  Start time: 1557  Stop time: 1647  2 mg versed   75 mcg fentanyl   An independent trained observer pushed medications at my direction. We monitored the patient's level of consciousness and vital signs/physiologic status throughout the procedure duration (see start and start times above).      Bleeding risk: low  LVEDP: 18 mmHg  AO: 110/46 mmHg  Estimated blood loss: less than 20 mL  Contrast: 78 mL  Fluoroscopy time: 10.3 min.    Anatomy:   LM-100% prox  RCA-100% prox, left to right collaterals  LIMA-LAD: widely patent  SVG-OM 1: 95% distal  SVG-RCA: 100% ostial  LVEF- not done due to renal insufficiency  Aortic root: Non-aneurysmal, no significant AI, 1 SVG visualized.    Note: Area of vascularity noted on chest imaging suggestive of AV malformation.    Impression:  Severe native CAD.  Widely patent LIMA-LAD.  Severely stenosed SVG-OM1.  Occluded SVG-RCA.  Elevated LVEDP.  Possible AV malformation.    Plan:  1.  Plan staged PCI of SVG-OM1, renal function permitting, early next week.  2.  Given elevated LVEDP, will not hydrate.  Further management per nephrology in terms of renal optimization.  3.  Plavix 75 mg daily starting tomorrow.  4.  Clinical correlation regarding possibly AV malformation.    Discussed with patient, nephrology and son.    Jorge Savage MD

## 2023-02-17 NOTE — PROGRESS NOTES
[facility] PROGRESS NOTE    2/17/2023 8:10 AM        Name: Alfredo More            Admitted: 2/8/2023  Primary Care Provider: No primary care provider on file. (Tel: None)      Subjective:    Alfredo More is a 69 y.o. male with a past medical history of hypertension, hyperlipidemia, DM2, DKD3, and CAD s/p remote CABG who presented from [facility] with chest pressure and SOB with accompanying edema. He has had poor cardiac follow up in the past.  He was started on lasix by PCP without improvement. Troponin 0.45 in ER an he was transferred to [facility] for NSTEMI and acute CHF. Interval History: Today, he continues to feel well. No orthopnea or SOB. Continues with LE swelling and discoloration. Cr 1.7 again today. Cardiology plans for [facility] today. Nephrology plans for further diuresis if renal fx remains stable post cath. Independently reviewed interval ancillary notes from cardiology. Problem List  Principal Problem:    Acute systolic congestive heart failure (HCC)  Active Problems:    Coronary artery disease due to calcified coronary lesion    Stage 3 chronic kidney disease (HCC)    Type 2 diabetes mellitus with stage 3 chronic kidney disease, without long-term current use of insulin (HCC)    Edema    Hyperlipidemia    Iron deficiency anemia    Chronic systolic (congestive) heart failure (HCC)    Electrolyte imbalance    Primary hypertension    Cardiomyopathy ([address]    Acute kidney injury superimposed on CKD ([address]  Resolved Problems:    * No resolved hospital problems.  *     Assessment and Plan:    Acute Combined CHF   - remains fluid up, no SOB or orthopnea   - Echo with EF 25-30% with GIIDD   - IV lasix held per nephrology, likely can transition to oral medications post [facility]   - Strict I&O, daily weight, and fluid restiction  Cardiomyopathy   - New problem, EF 25-30% with global hypokinesis more pronounced in the inferior and septal walls   - Plans for [facility] today   CKD 3   - cr remains stable today 1.7 (baseline 1.6)   - Nephrology following Dr. Hernan Crenshaw  NSTEMI/CAD   - Remote hx of CABG   - [facility] today   DM2    - A1C 6.8   - On Levemir, actos, and nateglinide at home    - Reviewed BG and recurrent hypoglycemia with am to 52; Reduce lantus to 15 units bid    - No Actos at discharge as can contribute to LE swelling  Hypertension   - Controlled, continue current medications,   Hyperlipidemia   - LDL 45, continue with statin   Normocytic Anemia    - Hgb 11.6 with low iron, receiving IV Venofer x5   - Needs OP colonoscopy screening   - CBC stable  BPH   - No acute concern for obstruction, continue home Flomax    - NPO for [facility] today  - follow renal fx and consider diuresis further later this admission    Discussed care with patient and nursing  Discussed case, assessment and plan of care with Dr. Joseph Castro    Diet: Diet NPO Exceptions are: Marcrenzo[place] with Meds  Code:Full Code  DVT PPX: Lovenox PPx    Disposition: Home without needs when medically able, likely needs 2 additional days for diuresis and further testing    Current Medications  insulin glargine (LANTUS) injection vial 15 Units, BID  sodium chloride flush 0.9 % injection 10 mL, 2 times per day  sodium chloride flush 0.9 % injection 10 mL, PRN  0.9 % sodium chloride infusion, PRN  ondansetron (ZOFRAN-ODT) disintegrating tablet 4 mg, Q8H PRN   Or  ondansetron (ZOFRAN) injection 4 mg, Q6H PRN  senna (SENOKOT) tablet 8.6 mg, Daily PRN  acetaminophen (TYLENOL) tablet 650 mg, Q6H PRN   Or  acetaminophen (TYLENOL) suppository 650 mg, Q6H PRN  enoxaparin Sodium (LOVENOX) injection 30 mg, BID  potassium chloride (KLOR-CON M) extended release tablet 40 mEq, PRN   Or  potassium bicarb-citric acid (EFFER-K) effervescent tablet 40 mEq, PRN   Or  potassium chloride 10 mEq/100 mL IVPB (Peripheral Line), PRN  magnesium sulfate 2000 mg in 50 mL IVPB premix, PRN  aspirin tablet 325 mg, Daily  atorvastatin (LIPITOR) tablet 40 mg, Daily  tamsulosin (FLOMAX) capsule 0.8 mg, Nightly  insulin lispro (HUMALOG) injection vial 0-4 Units, TID WC  insulin lispro (HUMALOG) injection vial 0-4 Units, Nightly  dextrose bolus 10% 125 mL, PRN   Or  dextrose bolus 10% 250 mL, PRN  glucagon (rDNA) injection 1 mg, PRN  dextrose 10 % infusion, Continuous PRN  perflutren lipid microspheres (DEFINITY) injection 1.5 mL, ONCE PRN  carvedilol (COREG) tablet 3.125 mg, BID WC      Objective:  /61   Pulse 70   Temp 97.4 °F (36.3 °C) (Oral)   Resp 18   Ht 6' 1\" (1.854 m)   Wt 293 lb (132.9 kg)   SpO2 98%   BMI 38.66 kg/m²   Vitals:    02/17/23 0730   BP: 123/61   Pulse: 70   Resp: 18   Temp: 97.4 °F (36.3 °C)   SpO2: 98%       Intake/Output Summary (Last 24 hours) at 2/17/2023 0810  Last data filed at 2/17/2023 0230  Gross per 24 hour   Intake 980 ml   Output 1075 ml   Net -95 ml        Wt Readings from Last 3 Encounters:   02/17/23 293 lb (132.9 kg)       Review of Systems:  Constitutional: No fever, Yes sudden weight changes, No weakness  Skin: No excessive bruising, No bleeding, No changes in skin pigment, normal turgor  Respiratory: Yes SOB, No cough, No wheezing, No recent URI  Cardiovascular: Negative for CP, palpitations, dizziness, or syncope  Gastrointestinal: No abdominal pain, No nausea, No vomiting, No diarrhea, No constipation, No black/tarry stools  Genito-Urinary: No hematuria, No dysuria, No polyuria,   Musculoskeletal: No pain, No swelling, No new mobility concerns  Endocrine: No excessive sweating   Neurological/Psych: No for confusion, No TIA-like symptoms. Denies any acute depression, anxiety, or insomnia. Physical Examination:  Telemetry: Personally Reviewed Normal sinus rhythm, IVCD, PVCs occasional  Constitutional: Cooperative and in no apparent distress, appears well nourished, Yes obesity  Skin: Warm and pink; no cyanosis, bruising, or clubbing, No lesions/incisions, BLE enzo   HEENT: Symmetric and normocephalic.  Conjunctiva pink with clear sclera. Mucus membranes pink and moist.   Cardiovascular: regular rate and rhythm. S1 & S2, negative for murmurs. Peripheral pulses 2+, Yes 1-2+ BLE edema  Respiratory: Respirations symmetric and unlabored. Lungs clear to auscultation bilaterally, no wheezing, crackles, or rhonchi + diminished BLL  Gastrointestinal: Abdomen soft and round. normal bowel sounds. No tenderness  Musculoskeletal: No focal weakness, muscle strength 5/5 bilaterally  Neurologic/Psych: Awake and orientated to person, place and time. Calm affect, appropriate mood. Pertinent labs, diagnostic, and imaging results reviewed as a part of this visit    Labs and Tests:  CBC:   Recent Labs     02/15/23  0608   WBC 6.6   HGB 11.6*          BMP:    Recent Labs     02/15/23  0608 02/16/23  0544 02/17/23  0431    138 137   K 3.8 4.1 4.2    103 102   CO2 27 28 29   BUN 58* 55* 55*   CREATININE 1.9* 1.7* 1.7*   GLUCOSE 85 177* 153*       Hepatic: No results for input(s): AST, ALT, ALB, BILITOT, ALKPHOS in the last 72 hours. Relevant results:  CXR 2/8/2023:  Mild cardiomegaly with findings of pulmonary edema. Echo 2/9/2023:  Summary   Left ventricular size is moderately increased. Left ventricular systolic function is severely depressed with ejection   fraction estimated at 25-30%. Global hypokinesis with hypokinesis more pronounced in the inferior and septal walls. There is mild concentric left ventricular hypertrophy. Grade II diastolic dysfunction with elevated LV filling pressures. Mild to moderate mitral regurgitation. The left atrium is mildly dilated. Moderate tricuspid regurgitation. Systolic pulmonary artery pressure (SPAP) is normal and estimated at 30 mmHg (right atrial pressure 8 mmHg).     Tyra Clemente, ZION - CNP   2/17/2023 8:10 AM

## 2023-02-17 NOTE — PLAN OF CARE
Problem: Discharge Planning  Goal: Discharge to home or other facility with appropriate resources  Outcome: Progressing  Flowsheets (Taken 2/17/2023 6278)  Discharge to home or other facility with appropriate resources:   Identify barriers to discharge with patient and caregiver   Arrange for needed discharge resources and transportation as appropriate   Identify discharge learning needs (meds, wound care, etc)   Refer to discharge planning if patient needs post-hospital services based on physician order or complex needs related to functional status, cognitive ability or social support system     Problem: Safety - Adult  Goal: Free from fall injury  Outcome: Progressing     Problem: ABCDS Injury Assessment  Goal: Absence of physical injury  Outcome: Progressing     Problem: Respiratory - Adult  Goal: Achieves optimal ventilation and oxygenation  Outcome: Progressing  Flowsheets (Taken 2/17/2023 0823)  Achieves optimal ventilation and oxygenation:   Assess for changes in respiratory status   Assess for changes in mentation and behavior   Position to facilitate oxygenation and minimize respiratory effort   Oxygen supplementation based on oxygen saturation or arterial blood gases   Encourage broncho-pulmonary hygiene including cough, deep breathe, incentive spirometry   Assess the need for suctioning and aspirate as needed   Assess and instruct to report shortness of breath or any respiratory difficulty   Respiratory therapy support as indicated     Problem: Cardiovascular - Adult  Goal: Maintains optimal cardiac output and hemodynamic stability  Outcome: Progressing     Problem: Skin/Tissue Integrity - Adult  Goal: Skin integrity remains intact  Outcome: Progressing     Problem: Musculoskeletal - Adult  Goal: Return mobility to safest level of function  Outcome: Progressing  Flowsheets (Taken 2/17/2023 0823)  Return Mobility to Safest Level of Function:   Assess patient stability and activity tolerance for standing, transferring and ambulating with or without assistive devices   Assist with transfers and ambulation using safe patient handling equipment as needed   Ensure adequate protection for wounds/incisions during mobilization   Obtain physical therapy/occupational therapy consults as needed   Apply continuous passive motion per provider or physical therapy orders to increase flexion toward goal   Instruct patient/family in ordered activity level     Problem: Gastrointestinal - Adult  Goal: Maintains or returns to baseline bowel function  Outcome: Progressing  Flowsheets (Taken 2/17/2023 0823)  Maintains or returns to baseline bowel function:   Assess bowel function   Encourage oral fluids to ensure adequate hydration   Administer IV fluids as ordered to ensure adequate hydration   Administer ordered medications as needed   Encourage mobilization and activity   Nutrition consult to assist patient with appropriate food choices     Problem: Genitourinary - Adult  Goal: Absence of urinary retention  Outcome: Progressing  Flowsheets (Taken 2/17/2023 0823)  Absence of urinary retention:   Assess patients ability to void and empty bladder   Monitor intake/output and perform bladder scan as needed   Place urinary catheter per Licensed Independent Practitioner order if needed   Discuss with Licensed Independent Practitioner  medications to alleviate retention as needed   Discuss catheterization for long term situations as appropriate     Problem: Metabolic/Fluid and Electrolytes - Adult  Goal: Electrolytes maintained within normal limits  Outcome: Progressing  Flowsheets (Taken 2/17/2023 0823)  Electrolytes maintained within normal limits:   Monitor labs and assess patient for signs and symptoms of electrolyte imbalances   Administer electrolyte replacement as ordered   Monitor response to electrolyte replacements, including repeat lab results as appropriate   Fluid restriction as ordered   Instruct patient on fluid and nutrition restrictions as appropriate     Problem: Pain  Goal: Verbalizes/displays adequate comfort level or baseline comfort level  Outcome: Progressing     Problem: Chronic Conditions and Co-morbidities  Goal: Patient's chronic conditions and co-morbidity symptoms are monitored and maintained or improved  Outcome: Progressing  Flowsheets (Taken 2/17/2023 6588)  Care Plan - Patient's Chronic Conditions and Co-Morbidity Symptoms are Monitored and Maintained or Improved:   Monitor and assess patient's chronic conditions and comorbid symptoms for stability, deterioration, or improvement   Collaborate with multidisciplinary team to address chronic and comorbid conditions and prevent exacerbation or deterioration   Update acute care plan with appropriate goals if chronic or comorbid symptoms are exacerbated and prevent overall improvement and discharge

## 2023-02-17 NOTE — PLAN OF CARE
Problem: Discharge Planning  Goal: Discharge to home or other facility with appropriate resources  2/16/2023 2331 by Simon Morton RN  Outcome: Progressing  2/16/2023 1600 by Antonia Dodge RN  Outcome: Progressing  Flowsheets (Taken 2/16/2023 4501)  Discharge to home or other facility with appropriate resources:   Identify barriers to discharge with patient and caregiver   Arrange for needed discharge resources and transportation as appropriate   Identify discharge learning needs (meds, wound care, etc)   Refer to discharge planning if patient needs post-hospital services based on physician order or complex needs related to functional status, cognitive ability or social support system     Problem: Safety - Adult  Goal: Free from fall injury  2/16/2023 2331 by Simon Morton RN  Outcome: Progressing  2/16/2023 1600 by Antonia Dodge RN  Outcome: Progressing     Problem: ABCDS Injury Assessment  Goal: Absence of physical injury  2/16/2023 2331 by Simon Morton RN  Outcome: Progressing  2/16/2023 1600 by Antonia Dodge RN  Outcome: Progressing     Problem: Respiratory - Adult  Goal: Achieves optimal ventilation and oxygenation  2/16/2023 2331 by Simon Morton RN  Outcome: Progressing  2/16/2023 1600 by Antonia Dodge RN  Outcome: Progressing  Flowsheets (Taken 2/16/2023 0816)  Achieves optimal ventilation and oxygenation:   Assess for changes in respiratory status   Assess for changes in mentation and behavior   Position to facilitate oxygenation and minimize respiratory effort   Oxygen supplementation based on oxygen saturation or arterial blood gases   Initiate smoking cessation protocol as indicated   Encourage broncho-pulmonary hygiene including cough, deep breathe, incentive spirometry   Assess the need for suctioning and aspirate as needed   Assess and instruct to report shortness of breath or any respiratory difficulty   Respiratory therapy support as indicated     Problem: Cardiovascular - Adult  Goal: Maintains optimal cardiac output and hemodynamic stability  2/16/2023 2331 by Melvina Kwong RN  Outcome: Progressing  2/16/2023 1600 by James Francisco RN  Outcome: Progressing  Flowsheets (Taken 2/16/2023 7416)  Maintains optimal cardiac output and hemodynamic stability:   Monitor blood pressure and heart rate   Monitor urine output and notify Licensed Independent Practitioner for values outside of normal range   Assess for signs of decreased cardiac output   Administer fluid and/or volume expanders as ordered   Administer vasoactive medications as ordered   For PPHN infants, administer sedation as ordered and minimize all controllable stressors.      Problem: Skin/Tissue Integrity - Adult  Goal: Skin integrity remains intact  2/16/2023 2331 by Melvina Kwong RN  Outcome: Progressing  2/16/2023 1600 by James Francisco RN  Outcome: Progressing  Flowsheets (Taken 2/16/2023 4232)  Skin Integrity Remains Intact:   Monitor for areas of redness and/or skin breakdown   Assess vascular access sites hourly   Every 4-6 hours minimum: Change oxygen saturation probe site   Every 4-6 hours: If on nasal continuous positive airway pressure, respiratory therapy assesses nares and determine need for appliance change or resting period     Problem: Musculoskeletal - Adult  Goal: Return mobility to safest level of function  2/16/2023 2331 by Melvina Kwong RN  Outcome: Progressing  2/16/2023 1600 by James Francisco RN  Outcome: Progressing  Flowsheets (Taken 2/16/2023 0816)  Return Mobility to Safest Level of Function:   Assess patient stability and activity tolerance for standing, transferring and ambulating with or without assistive devices   Assist with transfers and ambulation using safe patient handling equipment as needed   Ensure adequate protection for wounds/incisions during mobilization   Obtain physical therapy/occupational therapy consults as needed   Apply continuous passive motion per provider or physical therapy orders to increase flexion toward goal   Instruct patient/family in ordered activity level     Problem: Gastrointestinal - Adult  Goal: Maintains or returns to baseline bowel function  2/16/2023 1600 by Beatriz Abdullahi RN  Outcome: Progressing  Flowsheets (Taken 2/16/2023 0816)  Maintains or returns to baseline bowel function:   Assess bowel function   Encourage oral fluids to ensure adequate hydration   Administer IV fluids as ordered to ensure adequate hydration   Administer ordered medications as needed   Encourage mobilization and activity   Nutrition consult to assist patient with appropriate food choices     Problem: Genitourinary - Adult  Goal: Absence of urinary retention  2/16/2023 1600 by Beatriz Abdullahi RN  Outcome: Progressing  Flowsheets (Taken 2/16/2023 0816)  Absence of urinary retention:   Monitor intake/output and perform bladder scan as needed   Assess patients ability to void and empty bladder   Place urinary catheter per Licensed Independent Practitioner order if needed   Discuss with Licensed Independent Practitioner  medications to alleviate retention as needed   Discuss catheterization for long term situations as appropriate     Problem: Metabolic/Fluid and Electrolytes - Adult  Goal: Electrolytes maintained within normal limits  2/16/2023 2331 by Alice Peañ RN  Outcome: Progressing  2/16/2023 1600 by Beatriz Abdullahi RN  Outcome: Progressing  Flowsheets (Taken 2/16/2023 0353)  Electrolytes maintained within normal limits:   Monitor labs and assess patient for signs and symptoms of electrolyte imbalances   Administer electrolyte replacement as ordered   Monitor response to electrolyte replacements, including repeat lab results as appropriate   Fluid restriction as ordered   Instruct patient on fluid and nutrition restrictions as appropriate     Problem: Pain  Goal: Verbalizes/displays adequate comfort level or baseline comfort level  2/16/2023 2331 by Bharath Shirley RN  Outcome: Progressing  2/16/2023 1600 by Becky Dunn RN  Outcome: Progressing

## 2023-02-17 NOTE — PRE SEDATION
Brief Pre-Op Note/Sedation Assessment      Alfredo More  1945  [id number]  11:32 AM    Planned Procedure: Cardiac Catheterization Procedure  Post Procedure Plan: Return to same level of care  Consent: I have discussed with the patient and/or the patient representative the indication, alternatives, and the possible risks and/or complications of the planned procedure and the anesthesia methods. The patient and/or patient representative appear to understand and agree to proceed. Chief Complaint:   Anginal Equivalent  Dyspnea on Exertion  Dyspnea      Indications for Cath Procedure:  Presentation:  Cardiomyopathy and LV Dysfunction  2. Anginal Classification within 2 weeks:  No symptoms  3. Angina Symptoms Assessment:  Asymptomatic  4. Heart Failure Class within last 2 weeks:  Yes:  Heart Failure Type: Systolic Severity:  Class IV - Symptoms of HF at rest  5. Cardiovascular Instability:  No    Prior Ischemic Workup/Eval:  Pre-Procedural Medications: Yes: Aspirin, Beta Blockers, and STATIN  2. Stress Test Completed? No    Does Patient need surgery? Cath Valve Surgery:  No    Pre-Procedure Medical History:  Vital Signs:  BP (!) 110/46   Pulse 58   Temp 98.1 °F (36.7 °C) (Oral)   Resp 18   Ht 6' 1\" (1.854 m)   Wt 293 lb (132.9 kg)   SpO2 94%   BMI 38.66 kg/m²     Allergies:     Allergies   Allergen Reactions    Latex      Medications:    Current Facility-Administered Medications   Medication Dose Route Frequency Provider Last Rate Last Admin    insulin glargine (LANTUS) injection vial 15 Units  15 Units SubCUTAneous BID ZION Villalta - CNP   15 Units at 02/17/23 0830    sodium chloride flush 0.9 % injection 10 mL  10 mL IntraVENous 2 times per day Juan Shore MD   10 mL at 02/17/23 0832    sodium chloride flush 0.9 % injection 10 mL  10 mL IntraVENous PRN Juan Shore MD   10 mL at 02/11/23 1752    0.9 % sodium chloride infusion   IntraVENous PRN Juan Shore MD ondansetron (ZOFRAN-ODT) disintegrating tablet 4 mg  4 mg Oral Q8H PRN Juan Shore MD        Or    ondansetron [facility]) injection 4 mg  4 mg IntraVENous Q6H PRN Juan Shore MD        [facility]) tablet 8.6 mg  1 tablet Oral Daily PRN Juan Shore MD   8.6 mg at 02/16/23 0827    acetaminophen (TYLENOL) tablet 650 mg  650 mg Oral Q6H PRN Juan Shore MD   650 mg at 02/08/23 1626    Or    acetaminophen (TYLENOL) suppository 650 mg  650 mg Rectal Q6H PRN Juan Shore MD        enoxaparin Sodium (LOVENOX) injection 30 mg  30 mg SubCUTAneous BID Juan Shore MD   30 mg at 02/16/23 2019    potassium chloride (KLOR-CON M) extended release tablet 40 mEq  40 mEq Oral PRN Juan Shore MD   40 mEq at 02/09/23 1456    Or    potassium bicarb-citric acid (EFFER-K) effervescent tablet 40 mEq  40 mEq Oral PRN Juan Shore MD        Or    potassium chloride 10 mEq/100 mL IVPB (Peripheral Line)  10 mEq IntraVENous PRN Juan Shore MD        magnesium sulfate 2000 mg in 50 mL IVPB premix  2,000 mg IntraVENous PRN Juan Shore MD        aspirin tablet 325 mg  325 mg Oral Daily Juan Shore MD   325 mg at 02/17/23 0829    atorvastatin (LIPITOR) tablet 40 mg  40 mg Oral Daily Juan Shore MD   40 mg at 02/17/23 0829    tamsulosin (FLOMAX) capsule 0.8 mg  0.8 mg Oral Nightly Juan Shore MD   0.8 mg at 02/16/23 2019    insulin lispro (HUMALOG) injection vial 0-4 Units  0-4 Units SubCUTAneous TID R Marcelina Feng 23 Juan Shore MD   1 Units at 02/16/23 1657    insulin lispro (HUMALOG) injection vial 0-4 Units  0-4 Units SubCUTAneous Nightly Juan Shore MD        dextrose bolus 10% 125 mL  125 mL IntraVENous PRN Juan Shore MD        Or    dextrose bolus 10% 250 mL  250 mL IntraVENous PRN Juan Shore MD        glucagon (rDNA) injection 1 mg  1 mg SubCUTAneous PRN Juan Shore MD        dextrose 10 % infusion   IntraVENous Continuous PRN Juan Shore MD        perflutren lipid microspheres (DEFINITY) injection 1.5 mL  1.5 mL IntraVENous ONCE PRN Nicholas Hernandez MD        carvedilol (COREG) tablet 3.125 mg  3.125 mg Oral BID WC Madalyn Murillo MD   3.125 mg at 02/17/23 8228       Past Medical History:  History reviewed. No pertinent past medical history. Surgical History:  No past surgical history on file. Pre-Sedation:  Pre-Sedation Documentation and Exam:  I have assessed the patient and reviewed the H&P on the chart. Prior History of Anesthesia Complications:   none    Modified Mallampati:  II (soft palate, uvula, fauces visible)    ASA Classification:  Class 3 - A patient with severe systemic disease that limits activity but is not incapacitating    Gabino Scale: Activity:  2 - Able to move 4 extremities voluntarily on command  Respiration:  2 - Able to breathe deeply and cough freely  Circulation:  2 - BP+/- 20mmHg of normal  Consciousness:  2 - Fully awake  Oxygen Saturation (color):  2 - Able to maintain oxygen saturation >92% on room air    Sedation/Anesthesia Plan:  Guard the patient's safety and welfare. Minimize physical discomfort and pain. Minimize negative psychological responses to treatment by providing sedation and analgesia and maximize the potential amnesia. Patient to meet pre-procedure discharge plan. Medication Planned:  midazolam intravenously and fentanyl intravenously    Patient is an appropriate candidate for plan of sedation:   yes    Discussed performance of the procedure as well as the associated risk, benefits and alternatives with the patient. Given CABG history grafts unknown, additional contrast required. Patient would be at at least moderate if not moderate to high, risk for contrast-induced nephropathy. Good and pertinent questions asked and answered.     Electronically signed by Cas Oliver MD on 2/17/2023 at 11:32 AM

## 2023-02-17 NOTE — PROGRESS NOTES
Nephrology  Note                                                                                                                                                                                                                                                                                                                                                               Office : [phone]     Fax :[phone]              Patient's Name: Curt Gove  9:38 AM  2/17/2023        Feels fine   No SOB  Creatinine level stable                        Plan for cath today       I/O last 3 completed shifts: In: 4688 [P.O.:1220]  Out: 2175 [Urine:2175]  I/O this shift:  In: 240 [P.O.:240]  Out: -       reports that he quit smoking about 29 years ago. His smoking use included cigarettes. He quit smokeless tobacco use about 12 years ago. His smokeless tobacco use included chew. He reports that he does not drink alcohol and does not use drugs.     Allergies:  Latex    Current Medications:    insulin glargine (LANTUS) injection vial 15 Units, BID  sodium chloride flush 0.9 % injection 10 mL, 2 times per day  sodium chloride flush 0.9 % injection 10 mL, PRN  0.9 % sodium chloride infusion, PRN  ondansetron (ZOFRAN-ODT) disintegrating tablet 4 mg, Q8H PRN   Or  ondansetron (ZOFRAN) injection 4 mg, Q6H PRN  senna (SENOKOT) tablet 8.6 mg, Daily PRN  acetaminophen (TYLENOL) tablet 650 mg, Q6H PRN   Or  acetaminophen (TYLENOL) suppository 650 mg, Q6H PRN  enoxaparin Sodium (LOVENOX) injection 30 mg, BID  potassium chloride (KLOR-CON M) extended release tablet 40 mEq, PRN   Or  potassium bicarb-citric acid (EFFER-K) effervescent tablet 40 mEq, PRN   Or  potassium chloride 10 mEq/100 mL IVPB (Peripheral Line), PRN  magnesium sulfate 2000 mg in 50 mL IVPB premix, PRN  aspirin tablet 325 mg, Daily  atorvastatin (LIPITOR) tablet 40 mg, Daily  tamsulosin (FLOMAX) capsule 0.8 mg, Nightly  insulin lispro (HUMALOG) injection vial 0-4 Units, TID WC  insulin lispro (HUMALOG) injection vial 0-4 Units, Nightly  dextrose bolus 10% 125 mL, PRN   Or  dextrose bolus 10% 250 mL, PRN  glucagon (rDNA) injection 1 mg, PRN  dextrose 10 % infusion, Continuous PRN  perflutren lipid microspheres (DEFINITY) injection 1.5 mL, ONCE PRN  carvedilol (COREG) tablet 3.125 mg, BID WC        Physical exam:     Vitals:  /61   Pulse 74   Temp 97.4 °F (36.3 °C) (Oral)   Resp 18   Ht 6' 1\" (1.854 m)   Wt 293 lb (132.9 kg)   SpO2 98%   BMI 38.66 kg/m²   Constitutional:  OAA X3 NAD  Skin: no rash, turgor wnl  Heent:  eomi, mmm  Neck: no bruits or jvd noted  Cardiovascular:  S1, S2 without m/r/g  Respiratory: CTA B without w/r/r  Abdomen:  +bs, soft, nt, nd  Ext: + lower extremity edema  Psychiatric: mood and affect appropriate  Musculoskeletal:  Rom, muscular strength intact    Data:   Labs:  CBC:   Recent Labs     02/15/23  0608   WBC 6.6   HGB 11.6*        BMP:    Recent Labs     02/15/23  0608 02/16/23  0544 02/17/23  0431    138 137   K 3.8 4.1 4.2    103 102   CO2 27 28 29   BUN 58* 55* 55*   CREATININE 1.9* 1.7* 1.7*   GLUCOSE 85 177* 153*     Ca/Mg/Phos:   Recent Labs     02/15/23  0608 02/16/23  0544 02/17/23  0431   CALCIUM 8.9 8.5 8.7     Hepatic:   No results for input(s): AST, ALT, ALB, BILITOT, ALKPHOS in the last 72 hours. Troponin:   No results for input(s): TROPONINI in the last 72 hours. BNP: No results for input(s): BNP in the last 72 hours. Lipids:   No results for input(s): CHOL, TRIG, HDL, LDLCALC, LABVLDL in the last 72 hours. ABGs: No results for input(s): PHART, PO2ART, GDV2KLU in the last 72 hours. INR:   No results for input(s): INR in the last 72 hours. UA:No results for input(s): Laqueta Reach, GLUCOSEU, BILIRUBINUR, Sorin Caldron, BLOODU, PHUR, PROTEINU, UROBILINOGEN, NITRU, LEUKOCYTESUR, LABMICR, URINETYPE in the last 72 hours.    Urine Microscopic: No results for input(s): LABCAST, BACTERIA, COMU, HYALCAST, WBCUA, RBCUA, EPIU in the last 72 hours. Urine Culture: No results for input(s): LABURIN in the last 72 hours. Urine Chemistry:   No results for input(s): [place], PROTEINUR, NAUR in the last 72 hours. IMAGING:  XR CHEST PORTABLE   Final Result   Mild cardiomegaly with findings of pulmonary edema. Assessment/Plan   CKD 3     2. HTN    3. Anemia    4. Acid- base/ Electrolyte imbalance     5. CHF     Plan   - will hold lasix today  - daily weights   - monitor UO and renal function   - labs in am     D/w cardiology.  Plan to do LHC today  Use minimal contrast possible   Low to moderate range risk for JAYCOB   Hold lasix today   Recommend to dose adjust all medications  based on renal functions  Maintain SBP> 90 mmHg   Daily weights   AVOID NSAIDs  Avoid Nephrotoxins  Monitor Intake/Output  Call if significant decrease in urine output                   Thank you for allowing us to participate in care of Kit Ely MD  Feel free to contact me   [facility] [address]  Office : [phone]  Fax :[phone]

## 2023-02-17 NOTE — PROGRESS NOTES
Physician Progress Note      PATIENT:               Alva Talley  CSN #:                  [id number]  :                       1945  ADMIT DATE:       2023 4:12 AM  DISCH DATE:  RESPONDING  PROVIDER #:        Radha Ramírez CNP          QUERY TEXT:    Patient admitted with Chest pain. Pt noted to have BPH and was treated with   (Flomax). ?If possible, please document in progress notes and discharge summary   if you are evaluating and/or treating any of the following: The medical record reflects the following:  Risk Factors: HTN, CHF, RAY, HLD, CKD3. Clinical Indicators: H&P note -BPH continue Flomax, Urine Chemistry:   LABCREA 139.8, PROTEINUR-14.00. Treatment: Flomax. Options provided:  -- BPH with partial/complete urinary obstruction  -- BPH with urinary retention without obstruction  -- Other - I will add my own diagnosis  -- Disagree - Not applicable / Not valid  -- Disagree - Clinically unable to determine / Unknown  -- Refer to Clinical Documentation Reviewer    PROVIDER RESPONSE TEXT:    This patient has BPH with urinary retention without obstruction.     Query created by: Campbell Martinez on 2023 8:01 AM      Electronically signed by:  Radha Ramírez CNP 2023 8:12 AM

## 2023-02-18 PROBLEM — I50.23 ACUTE ON CHRONIC SYSTOLIC HEART FAILURE (HCC): Status: ACTIVE | Noted: 2023-02-12

## 2023-02-18 LAB
ANION GAP SERPL CALCULATED.3IONS-SCNC: 5 MMOL/L (ref 3–16)
BUN BLDV-MCNC: 48 MG/DL (ref 7–20)
CALCIUM SERPL-MCNC: 9 MG/DL (ref 8.3–10.6)
CHLORIDE BLD-SCNC: 106 MMOL/L (ref 99–110)
CO2: 28 MMOL/L (ref 21–32)
CREAT SERPL-MCNC: 1.7 MG/DL (ref 0.8–1.3)
GFR SERPL CREATININE-BSD FRML MDRD: 41 ML/MIN/{1.73_M2}
GLUCOSE BLD-MCNC: 109 MG/DL (ref 70–99)
GLUCOSE BLD-MCNC: 151 MG/DL (ref 70–99)
GLUCOSE BLD-MCNC: 175 MG/DL (ref 70–99)
GLUCOSE BLD-MCNC: 260 MG/DL (ref 70–99)
HCT VFR BLD CALC: 33.5 % (ref 40.5–52.5)
HEMOGLOBIN: 11.1 G/DL (ref 13.5–17.5)
MCH RBC QN AUTO: 30.4 PG (ref 26–34)
MCHC RBC AUTO-ENTMCNC: 33.2 G/DL (ref 31–36)
MCV RBC AUTO: 91.7 FL (ref 80–100)
PDW BLD-RTO: 13.6 % (ref 12.4–15.4)
PERFORMED ON: ABNORMAL
PLATELET # BLD: 141 K/UL (ref 135–450)
PMV BLD AUTO: 9.8 FL (ref 5–10.5)
POTASSIUM SERPL-SCNC: 4.2 MMOL/L (ref 3.5–5.1)
PRO-BNP: 4437 PG/ML (ref 0–449)
RBC # BLD: 3.65 M/UL (ref 4.2–5.9)
SODIUM BLD-SCNC: 139 MMOL/L (ref 136–145)
WBC # BLD: 6.1 K/UL (ref 4–11)

## 2023-02-18 PROCEDURE — 36415 COLL VENOUS BLD VENIPUNCTURE: CPT

## 2023-02-18 PROCEDURE — 6370000000 HC RX 637 (ALT 250 FOR IP): Performed by: INTERNAL MEDICINE

## 2023-02-18 PROCEDURE — 80048 BASIC METABOLIC PNL TOTAL CA: CPT

## 2023-02-18 PROCEDURE — 85027 COMPLETE CBC AUTOMATED: CPT

## 2023-02-18 PROCEDURE — 2580000003 HC RX 258: Performed by: INTERNAL MEDICINE

## 2023-02-18 PROCEDURE — 83880 ASSAY OF NATRIURETIC PEPTIDE: CPT

## 2023-02-18 PROCEDURE — 99233 SBSQ HOSP IP/OBS HIGH 50: CPT | Performed by: INTERNAL MEDICINE

## 2023-02-18 PROCEDURE — 6360000002 HC RX W HCPCS: Performed by: INTERNAL MEDICINE

## 2023-02-18 PROCEDURE — 1200000000 HC SEMI PRIVATE

## 2023-02-18 RX ORDER — FUROSEMIDE 40 MG/1
40 TABLET ORAL DAILY
Status: DISCONTINUED | OUTPATIENT
Start: 2023-02-18 | End: 2023-02-21 | Stop reason: HOSPADM

## 2023-02-18 RX ADMIN — Medication 10 ML: at 08:45

## 2023-02-18 RX ADMIN — FUROSEMIDE 40 MG: 40 TABLET ORAL at 08:43

## 2023-02-18 RX ADMIN — ENOXAPARIN SODIUM 30 MG: 100 INJECTION SUBCUTANEOUS at 08:43

## 2023-02-18 RX ADMIN — Medication 10 ML: at 20:32

## 2023-02-18 RX ADMIN — CARVEDILOL 3.12 MG: 3.12 TABLET, FILM COATED ORAL at 08:43

## 2023-02-18 RX ADMIN — ENOXAPARIN SODIUM 30 MG: 100 INJECTION SUBCUTANEOUS at 20:32

## 2023-02-18 RX ADMIN — ATORVASTATIN CALCIUM 40 MG: 40 TABLET, FILM COATED ORAL at 08:43

## 2023-02-18 RX ADMIN — INSULIN GLARGINE 15 UNITS: 100 INJECTION, SOLUTION SUBCUTANEOUS at 08:43

## 2023-02-18 RX ADMIN — CARVEDILOL 3.12 MG: 3.12 TABLET, FILM COATED ORAL at 16:52

## 2023-02-18 RX ADMIN — TAMSULOSIN HYDROCHLORIDE 0.8 MG: 0.4 CAPSULE ORAL at 20:32

## 2023-02-18 RX ADMIN — CLOPIDOGREL BISULFATE 75 MG: 75 TABLET ORAL at 08:43

## 2023-02-18 RX ADMIN — ASPIRIN 81 MG: 81 TABLET, COATED ORAL at 08:43

## 2023-02-18 RX ADMIN — INSULIN GLARGINE 15 UNITS: 100 INJECTION, SOLUTION SUBCUTANEOUS at 20:38

## 2023-02-18 RX ADMIN — Medication 10 ML: at 20:33

## 2023-02-18 ASSESSMENT — PAIN SCALES - GENERAL
PAINLEVEL_OUTOF10: 0

## 2023-02-18 NOTE — PROGRESS NOTES
Nephrology  Note                                                                                                                                                                                                                                                                                                                                                               Office : [phone]     Fax :[phone]              Patient's Name: Karen Mckeon  4:25 PM  2/18/2023        Feels fine   No SOB  Creatinine level stable                      S/p [facility] yesterday       I/O last 3 completed shifts: In: 200 [P.O.:980]  Out: 1950 [NMO:4083]  I/O this shift:  In: -   Out: 400 [Urine:400]      reports that he quit smoking about 29 years ago. His smoking use included cigarettes. He quit smokeless tobacco use about 12 years ago. His smokeless tobacco use included chew. He reports that he does not drink alcohol and does not use drugs.     Allergies:  Latex    Current Medications:    furosemide (LASIX) tablet 40 mg, Daily  aspirin EC tablet 81 mg, Daily  sodium chloride flush 0.9 % injection 5-40 mL, 2 times per day  sodium chloride flush 0.9 % injection 5-40 mL, PRN  0.9 % sodium chloride infusion, PRN  acetaminophen (TYLENOL) tablet 650 mg, Q4H PRN  clopidogrel (PLAVIX) tablet 75 mg, Daily  insulin glargine (LANTUS) injection vial 15 Units, BID  sodium chloride flush 0.9 % injection 10 mL, 2 times per day  sodium chloride flush 0.9 % injection 10 mL, PRN  0.9 % sodium chloride infusion, PRN  ondansetron (ZOFRAN-ODT) disintegrating tablet 4 mg, Q8H PRN   Or  ondansetron (ZOFRAN) injection 4 mg, Q6H PRN  senna (SENOKOT) tablet 8.6 mg, Daily PRN  acetaminophen (TYLENOL) suppository 650 mg, Q6H PRN  enoxaparin Sodium (LOVENOX) injection 30 mg, BID  potassium chloride (KLOR-CON M) extended release tablet 40 mEq, PRN   Or  potassium bicarb-citric acid (EFFER-K) effervescent tablet 40 mEq, PRN   Or  potassium chloride 10 mEq/100 mL IVPB (Peripheral Line), PRN  magnesium sulfate 2000 mg in 50 mL IVPB premix, PRN  atorvastatin (LIPITOR) tablet 40 mg, Daily  tamsulosin (FLOMAX) capsule 0.8 mg, Nightly  insulin lispro (HUMALOG) injection vial 0-4 Units, TID WC  insulin lispro (HUMALOG) injection vial 0-4 Units, Nightly  dextrose bolus 10% 125 mL, PRN   Or  dextrose bolus 10% 250 mL, PRN  glucagon (rDNA) injection 1 mg, PRN  dextrose 10 % infusion, Continuous PRN  perflutren lipid microspheres (DEFINITY) injection 1.5 mL, ONCE PRN  carvedilol (COREG) tablet 3.125 mg, BID WC        Physical exam:     Vitals:  /65   Pulse 73   Temp 97.6 °F (36.4 °C) (Temporal)   Resp 18   Ht 6' 1\" (1.854 m)   Wt 292 lb 5.3 oz (132.6 kg)   SpO2 99%   BMI 38.57 kg/m²   Constitutional:  OAA X3 NAD  Skin: no rash, turgor wnl  Heent:  eomi, mmm  Neck: no bruits or jvd noted  Cardiovascular:  S1, S2 without m/r/g  Respiratory: CTA B without w/r/r  Abdomen:  +bs, soft, nt, nd  Ext: + lower extremity edema  Psychiatric: mood and affect appropriate  Musculoskeletal:  Rom, muscular strength intact    Data:   Labs:  CBC:   Recent Labs     02/18/23 0420   WBC 6.1   HGB 11.1*        BMP:    Recent Labs     02/16/23  0544 02/17/23  0431 02/18/23  0420    137 139   K 4.1 4.2 4.2    102 106   CO2 28 29 28   BUN 55* 55* 48*   CREATININE 1.7* 1.7* 1.7*   GLUCOSE 177* 153* 151*     Ca/Mg/Phos:   Recent Labs     02/16/23  0544 02/17/23  0431 02/18/23  0420   CALCIUM 8.5 8.7 9.0     Hepatic:   No results for input(s): AST, ALT, ALB, BILITOT, ALKPHOS in the last 72 hours. Troponin:   No results for input(s): TROPONINI in the last 72 hours. BNP: No results for input(s): BNP in the last 72 hours. Lipids:   No results for input(s): CHOL, TRIG, HDL, LDLCALC, LABVLDL in the last 72 hours. ABGs: No results for input(s): PHART, PO2ART, GHX5ZZB in the last 72 hours. INR:   No results for input(s): INR in the last 72 hours.     UA:No results for input(s): Yulia Murphy, GLUCOSEU, BILIRUBINUR, Hermenia Zack, BLOODU, PHUR, PROTEINU, UROBILINOGEN, NITRU, LEUKOCYTESUR, Patrici Mtz in the last 72 hours. Urine Microscopic: No results for input(s): LABCAST, BACTERIA, COMU, HYALCAST, WBCUA, RBCUA, EPIU in the last 72 hours. Urine Culture: No results for input(s): LABURIN in the last 72 hours. Urine Chemistry:   No results for input(s): Fernando [place], PROTEINUR, NAUR in the last 72 hours. IMAGING:  XR CHEST PORTABLE   Final Result   Mild cardiomegaly with findings of pulmonary edema. Assessment/Plan   CKD 3     2. HTN    3. Anemia    4. Acid- base/ Electrolyte imbalance     5. CHF     Plan   - resume lasix low dose today   - daily weights   - monitor UO and renal function   - labs in am     D/w cardiology.  HAD LHC yesterday     Planned for staged intervention on Monday       Use minimal contrast possible   Low to moderate range risk for JAYCOB     Hold lasix on Monday     Recommend to dose adjust all medications  based on renal functions  Maintain SBP> 90 mmHg   Daily weights   AVOID NSAIDs  Avoid Nephrotoxins  Monitor Intake/Output  Call if significant decrease in urine output                   Thank you for allowing us to participate in care of Alesha Bhatti MD  Feel free to contact me   [facility] [address]  Office : [phone]  Fax :[phone]

## 2023-02-18 NOTE — PROGRESS NOTES
[facility] PROGRESS NOTE    2/18/2023 10:04 AM        Name: Melissa Richardson            Admitted: 2/8/2023  Primary Care Provider: No primary care provider on file. (Tel: None)      Subjective:    Melissa Richardson is a 69 y.o. male with a past medical history of hypertension, hyperlipidemia, DM2, DKD3, and CAD s/p remote CABG who presented from [facility] with chest pressure and SOB with accompanying edema. He has had poor cardiac follow up in the past.  He was started on lasix by PCP without improvement. Troponin 0.45 in ER an he was transferred to [facility] for NSTEMI and acute CHF. Interval History: Today, he is feeling well. Demetria CP or orthopnea. LE swelling continues to improve. Nephrology following and will resume lasix 40 mg PO today. Cr stable at 1.7 post cath. S/p LHC yesterday with plans for staged PCI on Monday. Independently reviewed interval ancillary notes from cardiology and nephrology . Problem List  Principal Problem:    Acute systolic congestive heart failure (HCC)  Active Problems:    Coronary artery disease due to calcified coronary lesion    Stage 3 chronic kidney disease (HCC)    Type 2 diabetes mellitus with stage 3 chronic kidney disease, without long-term current use of insulin (HCC)    Edema    Hyperlipidemia    Iron deficiency anemia    Chronic systolic (congestive) heart failure (HCC)    Electrolyte imbalance    Primary hypertension    Cardiomyopathy ([address]    Acute kidney injury superimposed on CKD ([address]  Resolved Problems:    * No resolved hospital problems.  *     Assessment and Plan:    Acute Combined CHF   - remains fluid up, no SOB or orthopnea   - Echo with EF 25-30% with GIIDD   - IV lasix held per nephrology, likely can transition to oral medications post LHC   - Strict I&O, daily weight, and fluid restiction  Cardiomyopathy   - New problem, EF 25-30% with global hypokinesis more pronounced in the inferior and septal walls   - Plans for [facility] today   CKD 3   - cr remains stable today 1.7 (baseline 1.6)   - Nephrology following Dr. Mary Nelson  NSTEMI/CAD   - Remote hx of CABG   - [facility] today   DM2    - A1C 6.8   - On Levemir, actos, and nateglinide at home    - Reviewed BG and recurrent hypoglycemia with am to 52; Reduce lantus to 15 units bid    - No Actos at discharge as can contribute to LE swelling  Hypertension   - Controlled, continue current medications,   Hyperlipidemia   - LDL 45, continue with statin   Normocytic Anemia    - Hgb 11.6 with low iron, receiving IV Venofer x5   - Needs OP colonoscopy screening   - CBC stable  BPH   - No acute concern for obstruction, continue home Flomax    - Staged PCI, early next week  - Elevated EDP, resume lasix 40 mg daily per nephrology recs    Discussed care with patient and nursing  Discussed case, assessment and plan of care with Dr. Meek Dk: ADULT DIET;  Regular; Low Fat/Low Chol/High Fiber/2 gm Na  Code:Full Code  DVT PPX: Lovenox PPx    Disposition: Home without needs when medically able, likely needs 2 additional days for diuresis and further testing    Current Medications  furosemide (LASIX) tablet 40 mg, Daily  aspirin EC tablet 81 mg, Daily  sodium chloride flush 0.9 % injection 5-40 mL, 2 times per day  sodium chloride flush 0.9 % injection 5-40 mL, PRN  0.9 % sodium chloride infusion, PRN  acetaminophen (TYLENOL) tablet 650 mg, Q4H PRN  clopidogrel (PLAVIX) tablet 75 mg, Daily  insulin glargine (LANTUS) injection vial 15 Units, BID  sodium chloride flush 0.9 % injection 10 mL, 2 times per day  sodium chloride flush 0.9 % injection 10 mL, PRN  0.9 % sodium chloride infusion, PRN  ondansetron (ZOFRAN-ODT) disintegrating tablet 4 mg, Q8H PRN   Or  ondansetron (ZOFRAN) injection 4 mg, Q6H PRN  senna (SENOKOT) tablet 8.6 mg, Daily PRN  acetaminophen (TYLENOL) suppository 650 mg, Q6H PRN  enoxaparin Sodium (LOVENOX) injection 30 mg, BID  potassium chloride (KLOR-CON M) extended release tablet 40 mEq, PRN   Or  potassium bicarb-citric acid (EFFER-K) effervescent tablet 40 mEq, PRN   Or  potassium chloride 10 mEq/100 mL IVPB (Peripheral Line), PRN  magnesium sulfate 2000 mg in 50 mL IVPB premix, PRN  atorvastatin (LIPITOR) tablet 40 mg, Daily  tamsulosin (FLOMAX) capsule 0.8 mg, Nightly  insulin lispro (HUMALOG) injection vial 0-4 Units, TID WC  insulin lispro (HUMALOG) injection vial 0-4 Units, Nightly  dextrose bolus 10% 125 mL, PRN   Or  dextrose bolus 10% 250 mL, PRN  glucagon (rDNA) injection 1 mg, PRN  dextrose 10 % infusion, Continuous PRN  perflutren lipid microspheres (DEFINITY) injection 1.5 mL, ONCE PRN  carvedilol (COREG) tablet 3.125 mg, BID WC      Objective:  /72   Pulse 82   Temp 97.8 °F (36.6 °C) (Temporal)   Resp 16   Ht 6' 1\" (1.854 m)   Wt 292 lb 5.3 oz (132.6 kg)   SpO2 98%   BMI 38.57 kg/m²   Vitals:    02/18/23 0834   BP: 124/72   Pulse: 82   Resp: 16   Temp: 97.8 °F (36.6 °C)   SpO2: 98%       Intake/Output Summary (Last 24 hours) at 2/18/2023 1004  Last data filed at 2/18/2023 0945  Gross per 24 hour   Intake --   Output 1175 ml   Net -1175 ml        Wt Readings from Last 3 Encounters:   02/18/23 292 lb 5.3 oz (132.6 kg)       Review of Systems:  Constitutional: No fever, Yes sudden weight changes, No weakness  Skin: No excessive bruising, No bleeding, No changes in skin pigment, normal turgor  Respiratory: Yes SOB, No cough, No wheezing, No recent URI  Cardiovascular: Negative for CP, palpitations, dizziness, or syncope  Gastrointestinal: No abdominal pain, No nausea, No vomiting, No diarrhea, No constipation, No black/tarry stools  Genito-Urinary: No hematuria, No dysuria, No polyuria,   Musculoskeletal: No pain, No swelling, No new mobility concerns  Endocrine: No excessive sweating   Neurological/Psych: No for confusion, No TIA-like symptoms. Denies any acute depression, anxiety, or insomnia.       Physical Examination:  Telemetry: Personally Reviewed Normal sinus rhythm, IVCD, PVCs occasional  Constitutional: Cooperative and in no apparent distress, appears well nourished, Yes obesity  Skin: Warm and pink; no cyanosis, bruising, or clubbing, No lesions/incisions, BLE enzo   HEENT: Symmetric and normocephalic. Conjunctiva pink with clear sclera. Mucus membranes pink and moist.   Cardiovascular: regular rate and rhythm. S1 & S2, negative for murmurs. Peripheral pulses 2+, Yes 1+ BLE edema  Respiratory: Respirations symmetric and unlabored. Lungs clear to auscultation bilaterally, no wheezing, crackles, or rhonchi + diminished BLL  Gastrointestinal: Abdomen soft and round. normal bowel sounds. No tenderness  Musculoskeletal: No focal weakness, muscle strength 5/5 bilaterally  Neurologic/Psych: Awake and orientated to person, place and time. Calm affect, appropriate mood. Pertinent labs, diagnostic, and imaging results reviewed as a part of this visit    Labs and Tests:  CBC:   Recent Labs     02/18/23  0420   WBC 6.1   HGB 11.1*          BMP:    Recent Labs     02/16/23  0544 02/17/23  0431 02/18/23  0420    137 139   K 4.1 4.2 4.2    102 106   CO2 28 29 28   BUN 55* 55* 48*   CREATININE 1.7* 1.7* 1.7*   GLUCOSE 177* 153* 151*       Hepatic: No results for input(s): AST, ALT, ALB, BILITOT, ALKPHOS in the last 72 hours. Relevant results:  CXR 2/8/2023:  Mild cardiomegaly with findings of pulmonary edema. Echo 2/9/2023:  Summary   Left ventricular size is moderately increased. Left ventricular systolic function is severely depressed with ejection   fraction estimated at 25-30%. Global hypokinesis with hypokinesis more pronounced in the inferior and septal walls. There is mild concentric left ventricular hypertrophy. Grade II diastolic dysfunction with elevated LV filling pressures. Mild to moderate mitral regurgitation. The left atrium is mildly dilated. Moderate tricuspid regurgitation.    Systolic pulmonary artery pressure (SPAP) is normal and estimated at 30 mmHg (right atrial pressure 8 mmHg).     Elizabeth Acosta, ZION - CNP   2/18/2023 10:04 AM

## 2023-02-18 NOTE — PROGRESS NOTES
No acute events overnight. Post cath checks normal. No signs of bleeding/hematoma at R groin site. Pulses palpable. VSS.

## 2023-02-18 NOTE — PROGRESS NOTES
[address]  HEART FAILURE  Progress Note      Admit Date 2/8/2023     Reason for Consult:      Reason for Consultation/Chief Complaint: SOB    HPI:    Courtney Mckeon is a 69 y.o. male with PMH CAD,CABG, CKD, no cardio f/u in 10years admitted with confusion, SOB and edema. Echo shows low EF and pt had LHC yesterday with sever native CAD, stenosed SVG-OM1 and plan for staged PCI nest week. Subjective:  Patient is being seen for CHF. There were no acute overnight cardiac events. Today Mr. Lupis Merchant  is sitting in the chair and feels well today, denies chest pain, SOB, palpitations, or dizziness. Edema is improved      Review of Systems - History obtained from the patient  General ROS: negative  Respiratory ROS: no cough, shortness of breath, or wheezing  Cardiovascular ROS: no chest pain or dyspnea on exertion  Gastrointestinal ROS: no abdominal pain, change in bowel habits, or black or bloody stools  Musculoskeletal ROS: negative  Neurological ROS: no TIA or stroke symptoms     Baseline Weight: 292 hosp scale   Wt Readings from Last 3 Encounters:   02/18/23 292 lb 5.3 oz (132.6 kg)         Cardiac Testing:   Cardiac Cath 2/17/2023:  Access: Right CFA   Hemostasis: Angioseal closure device  Note: High access point above inferior epigastric artery which had a low takeoff. Moderate Sedation:  Start time: 1557  Stop time: 1647  2 mg versed   75 mcg fentanyl   An independent trained observer pushed medications at my direction. We monitored the patient's level of consciousness and vital signs/physiologic status throughout the procedure duration (see start and start times above). Bleeding risk: low  LVEDP: 18 mmHg  AO: 110/46 mmHg  Estimated blood loss: less than 20 mL  Contrast: 78 mL  Fluoroscopy time: 10.3 min.      Anatomy:   LM-100% prox  RCA-100% prox, left to right collaterals  LIMA-LAD: widely patent  SVG-OM 1: 95% distal  SVG-RCA: 100% ostial  LVEF- not done due to renal insufficiency  Aortic root: Non-aneurysmal, no significant AI, 1 SVG visualized. Note: Area of vascularity noted on chest imaging suggestive of AV malformation. Impression:  Severe native CAD. Widely patent LIMA-LAD. Severely stenosed SVG-OM1. Occluded SVG-RCA. Elevated LVEDP. Possible AV malformation. Plan:  1. Plan staged PCI of SVG-OM1, renal function permitting, early next week. 2.  Given elevated LVEDP, will not hydrate. Further management per nephrology in terms of renal optimization. 3.  Plavix 75 mg daily starting tomorrow. 4.  Clinical correlation regarding possibly AV malformation  Echo 2/9/2023  Summary  Left ventricular size is moderately increased. Left ventricular systolic function is severely depressed with ejection fraction estimated at 25-30%. Global hypokinesis with hypokinesis more pronounced in the inferior and septal walls. There is mild concentric left ventricular hypertrophy. Grade II diastolic dysfunction with elevated LV filling pressures. Mild to moderate mitral regurgitation. The left atrium is mildly dilated. Moderate tricuspid regurgitation. Systolic pulmonary artery pressure (SPAP) is normal and estimated at 30 mmHg (right atrial pressure 8 mmHg). Device: none     NYHA Class III    Objective:   BP (!) 111/51   Pulse 73   Temp 98 °F (36.7 °C) (Temporal)   Resp 24   Ht 6' 1\" (1.854 m)   Wt 292 lb 5.3 oz (132.6 kg)   SpO2 98%   BMI 38.57 kg/m²     Intake/Output Summary (Last 24 hours) at 2/18/2023 0847  Last data filed at 2/17/2023 2102  Gross per 24 hour   Intake --   Output 1225 ml   Net -1225 ml      In: 480 [P.O.:480]  Out: 1525     TELEMETRY: SR    Physical Exam:  General Appearance:  Non-obese/Well Nourished  Respiratory:  Resp Auscultation: Normal breath sounds without dullness  Cardiovascular:   Auscultation: Regular rate and rhythm, normal S1S2, no m/g/r/c  Palpation: Normal    Pedal Pulses: 2+ and equal   Abdomen:  Soft, NT, ND, + bs  Extremities:  No Cyanosis or Clubbing  Extremities: trace edema  Neurological/Psychiatric:  Oriented to time, place, and person  Non-anxious    Pertinent labs, diagnostic, device, and imaging results reviewed as a part of this visit    MEDICATIONS:   Scheduled Meds:   Scheduled Meds:   furosemide  40 mg Oral Daily    aspirin  81 mg Oral Daily    sodium chloride flush  5-40 mL IntraVENous 2 times per day    clopidogrel  75 mg Oral Daily    insulin glargine  15 Units SubCUTAneous BID    sodium chloride flush  10 mL IntraVENous 2 times per day    enoxaparin  30 mg SubCUTAneous BID    atorvastatin  40 mg Oral Daily    tamsulosin  0.8 mg Oral Nightly    insulin lispro  0-4 Units SubCUTAneous TID WC    insulin lispro  0-4 Units SubCUTAneous Nightly    carvedilol  3.125 mg Oral BID WC     Continuous Infusions:   sodium chloride      sodium chloride      dextrose       PRN Meds:.sodium chloride flush, sodium chloride, acetaminophen, sodium chloride flush, sodium chloride, ondansetron **OR** ondansetron, senna, [DISCONTINUED] acetaminophen **OR** acetaminophen, potassium chloride **OR** potassium alternative oral replacement **OR** potassium chloride, magnesium sulfate, dextrose bolus **OR** dextrose bolus, glucagon (rDNA), dextrose, perflutren lipid microspheres  Continuous Infusions:   sodium chloride      sodium chloride      dextrose         Intake/Output Summary (Last 24 hours) at 2/18/2023 0847  Last data filed at 2/17/2023 2102  Gross per 24 hour   Intake --   Output 1225 ml   Net -1225 ml       Lab Data:  CBC:   Lab Results   Component Value Date/Time    WBC 6.1 02/18/2023 04:20 AM    HGB 11.1 02/18/2023 04:20 AM     02/18/2023 04:20 AM     BMP:  Lab Results   Component Value Date/Time     02/18/2023 04:20 AM    K 4.2 02/18/2023 04:20 AM     02/18/2023 04:20 AM    CO2 28 02/18/2023 04:20 AM    BUN 48 02/18/2023 04:20 AM    CREATININE 1.7 02/18/2023 04:20 AM    GLUCOSE 151 02/18/2023 04:20 AM INR:   Lab Results   Component Value Date/Time    INR 1.16 02/08/2023 06:03 AM        CARDIAC LABS  ENZYMES:No results for input(s): CKMB, CKMBINDEX, TROPONINI in the last 72 hours. Invalid input(s): CKTOTAL;3  FASTING LIPID PANEL:  Lab Results   Component Value Date/Time    HDL 43 02/08/2023 06:03 AM    LDLCALC 45 02/08/2023 06:03 AM    TRIG 69 02/08/2023 06:03 AM    TSH 5.90 02/08/2023 06:03 AM     LIVER PROFILE:  Lab Results   Component Value Date/Time    AST 18 02/09/2023 05:46 AM    ALT 16 02/09/2023 05:46 AM     BNP:   Lab Results   Component Value Date/Time    PROBNP 4,489 02/13/2023 09:49 AM    PROBNP 5,672 02/10/2023 04:50 AM    PROBNP 7,442 02/09/2023 05:46 AM     Iron Studies:    Lab Results   Component Value Date/Time    FERRITIN 145.8 02/09/2023 05:45 AM     Lab Results   Component Value Date    IRON 43 (L) 02/09/2023    TIBC 260 02/09/2023    FERRITIN 145.8 02/09/2023      Iron Deficiency Anemia:  Yes IV Iron Therapy:  Yes  2017 ACC/AHA HF Guidelines:   intravenous iron replacement in patients with New York Heart Association (NYHA) class II and III HF and iron deficiency(ferritin <100 ng/ml or 100-300 ng/ml if transferrin saturation <20%), to improve functional status and QoL. 1. WEIGHT: Admit Weight: (!) 308 lb 6.4 oz (139.9 kg)      Today  Weight: 292 lb 5.3 oz (132.6 kg)   2.  I/O   Intake/Output Summary (Last 24 hours) at 2/18/2023 0847  Last data filed at 2/17/2023 2102  Gross per 24 hour   Intake --   Output 1225 ml   Net -1225 ml         Assessment/Plan:     Acute Heart Failure:  ~compensated, 10L out   ~ BNP 3829>4713>3554>9228  ~Plan: continue po lasix,  Monitor I&O's, Daily weights, Pt education    Cardiomyopathy: New  ~Echo:  EF: 25-30%   Core measures for HF:  ACEi/ARB/ARNI: none for CKD, RAY, consider starting after LH if ok with nephrology  BB: Carvedilol  Brian: CKD  SGLT2i: CKD  ~Device: none    HTN:   ~controlled on coreg    CAD:   ~no chest pain  ~Meds:   Statin: lipitor  BB: Carvedilol  Antiplt: ASA, Plavix  ~Plan:   s/p C, plan for staged PCI next week                       RAY on CKD   ~Cr on admit 1.6>1.8>1.9>1.7, stable  ~Daily labs; trend creatinine  ~Nephrology consulted, Recs:  GILDA   ~s/p Venofer       All questions and concerns were addressed to the patient. Alternatives to my treatment were discussed. I have discussed the above stated plan with patient and the nurse. The patient verbalized understanding and agreed with the plan.     I appreciate the opportunity of cooperating in the care of this individual.    Ruma Carballo, APRN - CNP, ACNP, [address] 2/18/2023, 8:47 AM  Heart Failure  The [address], [address] [place] [address]  Ph: [phone]      Core Measures:   Discharge instructions:   LVEF documented:   ACEI for LV dysfunction:   Smoking Cessation:

## 2023-02-19 LAB
ANION GAP SERPL CALCULATED.3IONS-SCNC: 8 MMOL/L (ref 3–16)
BUN BLDV-MCNC: 42 MG/DL (ref 7–20)
CALCIUM SERPL-MCNC: 8.7 MG/DL (ref 8.3–10.6)
CHLORIDE BLD-SCNC: 106 MMOL/L (ref 99–110)
CO2: 25 MMOL/L (ref 21–32)
CREAT SERPL-MCNC: 1.6 MG/DL (ref 0.8–1.3)
GFR SERPL CREATININE-BSD FRML MDRD: 44 ML/MIN/{1.73_M2}
GLUCOSE BLD-MCNC: 114 MG/DL (ref 70–99)
GLUCOSE BLD-MCNC: 171 MG/DL (ref 70–99)
GLUCOSE BLD-MCNC: 188 MG/DL (ref 70–99)
GLUCOSE BLD-MCNC: 205 MG/DL (ref 70–99)
HCT VFR BLD CALC: 34 % (ref 40.5–52.5)
HEMOGLOBIN: 11.2 G/DL (ref 13.5–17.5)
MCH RBC QN AUTO: 30.6 PG (ref 26–34)
MCHC RBC AUTO-ENTMCNC: 32.9 G/DL (ref 31–36)
MCV RBC AUTO: 93 FL (ref 80–100)
PDW BLD-RTO: 14 % (ref 12.4–15.4)
PERFORMED ON: ABNORMAL
PLATELET # BLD: 138 K/UL (ref 135–450)
PMV BLD AUTO: 10.4 FL (ref 5–10.5)
POTASSIUM SERPL-SCNC: 4.4 MMOL/L (ref 3.5–5.1)
RBC # BLD: 3.66 M/UL (ref 4.2–5.9)
SODIUM BLD-SCNC: 139 MMOL/L (ref 136–145)
WBC # BLD: 6.3 K/UL (ref 4–11)

## 2023-02-19 PROCEDURE — 80048 BASIC METABOLIC PNL TOTAL CA: CPT

## 2023-02-19 PROCEDURE — 6370000000 HC RX 637 (ALT 250 FOR IP): Performed by: INTERNAL MEDICINE

## 2023-02-19 PROCEDURE — 1200000000 HC SEMI PRIVATE

## 2023-02-19 PROCEDURE — 6370000000 HC RX 637 (ALT 250 FOR IP): Performed by: NURSE PRACTITIONER

## 2023-02-19 PROCEDURE — 2580000003 HC RX 258: Performed by: INTERNAL MEDICINE

## 2023-02-19 PROCEDURE — 6360000002 HC RX W HCPCS: Performed by: INTERNAL MEDICINE

## 2023-02-19 PROCEDURE — 99233 SBSQ HOSP IP/OBS HIGH 50: CPT | Performed by: INTERNAL MEDICINE

## 2023-02-19 PROCEDURE — 85027 COMPLETE CBC AUTOMATED: CPT

## 2023-02-19 RX ORDER — INSULIN LISPRO 100 [IU]/ML
0-4 INJECTION, SOLUTION INTRAVENOUS; SUBCUTANEOUS
Status: DISCONTINUED | OUTPATIENT
Start: 2023-02-19 | End: 2023-02-21 | Stop reason: HOSPADM

## 2023-02-19 RX ORDER — INSULIN GLARGINE 100 [IU]/ML
20 INJECTION, SOLUTION SUBCUTANEOUS 2 TIMES DAILY
Status: DISCONTINUED | OUTPATIENT
Start: 2023-02-19 | End: 2023-02-21 | Stop reason: HOSPADM

## 2023-02-19 RX ORDER — INSULIN LISPRO 100 [IU]/ML
0-4 INJECTION, SOLUTION INTRAVENOUS; SUBCUTANEOUS NIGHTLY
Status: DISCONTINUED | OUTPATIENT
Start: 2023-02-19 | End: 2023-02-21 | Stop reason: HOSPADM

## 2023-02-19 RX ADMIN — ATORVASTATIN CALCIUM 40 MG: 40 TABLET, FILM COATED ORAL at 08:40

## 2023-02-19 RX ADMIN — TAMSULOSIN HYDROCHLORIDE 0.8 MG: 0.4 CAPSULE ORAL at 20:40

## 2023-02-19 RX ADMIN — Medication 10 ML: at 20:40

## 2023-02-19 RX ADMIN — ENOXAPARIN SODIUM 30 MG: 100 INJECTION SUBCUTANEOUS at 20:40

## 2023-02-19 RX ADMIN — CARVEDILOL 3.12 MG: 3.12 TABLET, FILM COATED ORAL at 17:16

## 2023-02-19 RX ADMIN — CARVEDILOL 3.12 MG: 3.12 TABLET, FILM COATED ORAL at 08:40

## 2023-02-19 RX ADMIN — Medication 10 ML: at 08:41

## 2023-02-19 RX ADMIN — INSULIN GLARGINE 20 UNITS: 100 INJECTION, SOLUTION SUBCUTANEOUS at 08:41

## 2023-02-19 RX ADMIN — FUROSEMIDE 40 MG: 40 TABLET ORAL at 08:40

## 2023-02-19 RX ADMIN — ENOXAPARIN SODIUM 30 MG: 100 INJECTION SUBCUTANEOUS at 08:41

## 2023-02-19 RX ADMIN — ASPIRIN 81 MG: 81 TABLET, COATED ORAL at 08:40

## 2023-02-19 RX ADMIN — CLOPIDOGREL BISULFATE 75 MG: 75 TABLET ORAL at 08:40

## 2023-02-19 ASSESSMENT — PAIN SCALES - GENERAL
PAINLEVEL_OUTOF10: 0

## 2023-02-19 NOTE — PROGRESS NOTES
Nephrology  Note                                                                                                                                                                                                                                                                                                                                                               Office : [phone]     Fax :[phone]              Patient's Name: Jack Linder  5:11 PM  2/19/2023        Feels fine   No SOB  Creatinine level stable                      S/p [facility] yesterday       I/O last 3 completed shifts: In: 720 [P.O.:720]  Out: 1500 [Urine:1500]  I/O this shift: In: 480 [P.O.:480]  Out: 1050 [Urine:1050]      reports that he quit smoking about 29 years ago. His smoking use included cigarettes. He quit smokeless tobacco use about 12 years ago. His smokeless tobacco use included chew. He reports that he does not drink alcohol and does not use drugs.     Allergies:  Latex    Current Medications:    insulin glargine (LANTUS) injection vial 20 Units, BID  insulin lispro (HUMALOG) injection vial 0-4 Units, TID WC  insulin lispro (HUMALOG) injection vial 0-4 Units, Nightly  furosemide (LASIX) tablet 40 mg, Daily  aspirin EC tablet 81 mg, Daily  sodium chloride flush 0.9 % injection 5-40 mL, 2 times per day  sodium chloride flush 0.9 % injection 5-40 mL, PRN  0.9 % sodium chloride infusion, PRN  acetaminophen (TYLENOL) tablet 650 mg, Q4H PRN  clopidogrel (PLAVIX) tablet 75 mg, Daily  sodium chloride flush 0.9 % injection 10 mL, 2 times per day  sodium chloride flush 0.9 % injection 10 mL, PRN  0.9 % sodium chloride infusion, PRN  ondansetron (ZOFRAN-ODT) disintegrating tablet 4 mg, Q8H PRN   Or  ondansetron (ZOFRAN) injection 4 mg, Q6H PRN  senna (SENOKOT) tablet 8.6 mg, Daily PRN  acetaminophen (TYLENOL) suppository 650 mg, Q6H PRN  enoxaparin Sodium (LOVENOX) injection 30 mg, BID  potassium chloride (KLOR-CON M) extended release tablet 40 mEq, PRN   Or  potassium bicarb-citric acid (EFFER-K) effervescent tablet 40 mEq, PRN   Or  potassium chloride 10 mEq/100 mL IVPB (Peripheral Line), PRN  magnesium sulfate 2000 mg in 50 mL IVPB premix, PRN  atorvastatin (LIPITOR) tablet 40 mg, Daily  tamsulosin (FLOMAX) capsule 0.8 mg, Nightly  dextrose bolus 10% 125 mL, PRN   Or  dextrose bolus 10% 250 mL, PRN  glucagon (rDNA) injection 1 mg, PRN  dextrose 10 % infusion, Continuous PRN  perflutren lipid microspheres (DEFINITY) injection 1.5 mL, ONCE PRN  carvedilol (COREG) tablet 3.125 mg, BID WC        Physical exam:     Vitals:  /80   Pulse 72   Temp 98 °F (36.7 °C) (Temporal)   Resp 18   Ht 6' 1\" (1.854 m)   Wt 291 lb 0.1 oz (132 kg)   SpO2 95%   BMI 38.39 kg/m²   Constitutional:  OAA X3 NAD  Skin: no rash, turgor wnl  Heent:  eomi, mmm  Neck: no bruits or jvd noted  Cardiovascular:  S1, S2 without m/r/g  Respiratory: CTA B without w/r/r  Abdomen:  +bs, soft, nt, nd  Ext: + lower extremity edema  Psychiatric: mood and affect appropriate  Musculoskeletal:  Rom, muscular strength intact    Data:   Labs:  CBC:   Recent Labs     02/18/23 0420 02/19/23  0450   WBC 6.1 6.3   HGB 11.1* 11.2*    138     BMP:    Recent Labs     02/17/23 0431 02/18/23  0420 02/19/23  0450    139 139   K 4.2 4.2 4.4    106 106   CO2 29 28 25   BUN 55* 48* 42*   CREATININE 1.7* 1.7* 1.6*   GLUCOSE 153* 151* 114*     Ca/Mg/Phos:   Recent Labs     02/17/23 0431 02/18/23 0420 02/19/23  0450   CALCIUM 8.7 9.0 8.7     Hepatic:   No results for input(s): AST, ALT, ALB, BILITOT, ALKPHOS in the last 72 hours.    Troponin:   No results for input(s): TROPONINI in the last 72 hours.    BNP: No results for input(s): BNP in the last 72 hours.  Lipids:   No results for input(s): CHOL, TRIG, HDL, LDLCALC, LABVLDL in the last 72 hours.    ABGs: No results for input(s): PHART, PO2ART, BKB0HFO in the last 72 hours.  INR:   No results for input(s): INR in the last  72 hours. UA:No results for input(s): Rodriguez Rad, GLUCOSEU, BILIRUBINUR, [place], BLOODU, PHUR, PROTEINU, UROBILINOGEN, NITRU, LEUKOCYTESUR, LABMICR, URINETYPE in the last 72 hours. Urine Microscopic: No results for input(s): LABCAST, BACTERIA, COMU, HYALCAST, WBCUA, RBCUA, EPIU in the last 72 hours. Urine Culture: No results for input(s): LABURIN in the last 72 hours. Urine Chemistry:   No results for input(s): Sharlotte Alt, PROTEINUR, NAUR in the last 72 hours. IMAGING:  XR CHEST PORTABLE   Final Result   Mild cardiomegaly with findings of pulmonary edema. Assessment/Plan   CKD 3     2. HTN    3. Anemia    4. Acid- base/ Electrolyte imbalance     5. CHF     Plan   - resume lasix low dose today   - daily weights   - monitor UO and renal function   - labs in am     D/w cardiology.  HAD [facility] yesterday     Planned for staged intervention on Monday       Use minimal contrast possible   Low to moderate range risk for JAYCOB     Hold lasix on Monday     Recommend to dose adjust all medications  based on renal functions  Maintain SBP> 90 mmHg   Daily weights   AVOID NSAIDs  Avoid Nephrotoxins  Monitor Intake/Output  Call if significant decrease in urine output                   Thank you for allowing us to participate in care of Angelica Wallace MD  Feel free to contact me   [facility] [address]  Office : [phone]  Fax :[phone]

## 2023-02-19 NOTE — PROGRESS NOTES
[address]  HEART FAILURE  Progress Note      Admit Date 2/8/2023     Reason for Consult:      Reason for Consultation/Chief Complaint: SOB    HPI:    Lu Warren is a 69 y.o. male with PMH CAD,CABG, CKD, no cardio f/u in 10years admitted with confusion, SOB and edema. Echo shows low EF and pt had LHC yesterday with sever native CAD, stenosed SVG-OM1 and plan for staged PCI this week. Subjective:  Patient is being seen for CHF. There were no acute overnight cardiac events. Today Mr. Lorrie Rojas  is sitting in the chair and feels well today, denies chest pain, SOB, palpitations, or dizziness. Edema is improved      Review of Systems - History obtained from the patient  General ROS: negative  Respiratory ROS: no cough, shortness of breath, or wheezing  Cardiovascular ROS: no chest pain or dyspnea on exertion  Gastrointestinal ROS: no abdominal pain, change in bowel habits, or black or bloody stools  Musculoskeletal ROS: negative  Neurological ROS: no TIA or stroke symptoms     Baseline Weight: 292 hosp scale   Wt Readings from Last 3 Encounters:   02/19/23 291 lb 0.1 oz (132 kg)         Cardiac Testing:   Cardiac Cath 2/17/2023:  Access: Right CFA   Hemostasis: Angioseal closure device  Note: High access point above inferior epigastric artery which had a low takeoff. Moderate Sedation:  Start time: 1557  Stop time: 1647  2 mg versed   75 mcg fentanyl   An independent trained observer pushed medications at my direction. We monitored the patient's level of consciousness and vital signs/physiologic status throughout the procedure duration (see start and start times above). Bleeding risk: low  LVEDP: 18 mmHg  AO: 110/46 mmHg  Estimated blood loss: less than 20 mL  Contrast: 78 mL  Fluoroscopy time: 10.3 min.      Anatomy:   LM-100% prox  RCA-100% prox, left to right collaterals  LIMA-LAD: widely patent  SVG-OM 1: 95% distal  SVG-RCA: 100% ostial  LVEF- not done due to renal insufficiency  Aortic root: Non-aneurysmal, no significant AI, 1 SVG visualized. Note: Area of vascularity noted on chest imaging suggestive of AV malformation. Impression:  Severe native CAD. Widely patent LIMA-LAD. Severely stenosed SVG-OM1. Occluded SVG-RCA. Elevated LVEDP. Possible AV malformation. Plan:  1. Plan staged PCI of SVG-OM1, renal function permitting, early next week. 2.  Given elevated LVEDP, will not hydrate. Further management per nephrology in terms of renal optimization. 3.  Plavix 75 mg daily starting tomorrow. 4.  Clinical correlation regarding possibly AV malformation  Echo 2/9/2023  Summary  Left ventricular size is moderately increased. Left ventricular systolic function is severely depressed with ejection fraction estimated at 25-30%. Global hypokinesis with hypokinesis more pronounced in the inferior and septal walls. There is mild concentric left ventricular hypertrophy. Grade II diastolic dysfunction with elevated LV filling pressures. Mild to moderate mitral regurgitation. The left atrium is mildly dilated. Moderate tricuspid regurgitation. Systolic pulmonary artery pressure (SPAP) is normal and estimated at 30 mmHg (right atrial pressure 8 mmHg). Device: none     NYHA Class III    Objective:   BP (!) 117/52   Pulse 71   Temp 98.2 °F (36.8 °C) (Temporal)   Resp 18   Ht 6' 1\" (1.854 m)   Wt 291 lb 0.1 oz (132 kg)   SpO2 95%   BMI 38.39 kg/m²     Intake/Output Summary (Last 24 hours) at 2/19/2023 0831  Last data filed at 2/19/2023 0600  Gross per 24 hour   Intake 720 ml   Output 1400 ml   Net -680 ml      In: 720 [P.O.:720]  Out: 1400     TELEMETRY: SR    Physical Exam:  General Appearance:  Non-obese/Well Nourished  Respiratory:  Resp Auscultation: Normal breath sounds without dullness  Cardiovascular:   Auscultation: Regular rate and rhythm, normal S1S2, no m/g/r/c  Palpation: Normal    Pedal Pulses: 2+ and equal   Abdomen:  Soft, NT, ND, + bs  Extremities:  No Cyanosis or Clubbing  Extremities: trace edema  Neurological/Psychiatric:  Oriented to time, place, and person  Non-anxious    Pertinent labs, diagnostic, device, and imaging results reviewed as a part of this visit    MEDICATIONS:   Scheduled Meds:   Scheduled Meds:   insulin glargine  20 Units SubCUTAneous BID    furosemide  40 mg Oral Daily    aspirin  81 mg Oral Daily    sodium chloride flush  5-40 mL IntraVENous 2 times per day    clopidogrel  75 mg Oral Daily    sodium chloride flush  10 mL IntraVENous 2 times per day    enoxaparin  30 mg SubCUTAneous BID    atorvastatin  40 mg Oral Daily    tamsulosin  0.8 mg Oral Nightly    insulin lispro  0-4 Units SubCUTAneous TID WC    insulin lispro  0-4 Units SubCUTAneous Nightly    carvedilol  3.125 mg Oral BID WC     Continuous Infusions:   sodium chloride      sodium chloride      dextrose       PRN Meds:.sodium chloride flush, sodium chloride, acetaminophen, sodium chloride flush, sodium chloride, ondansetron **OR** ondansetron, senna, [DISCONTINUED] acetaminophen **OR** acetaminophen, potassium chloride **OR** potassium alternative oral replacement **OR** potassium chloride, magnesium sulfate, dextrose bolus **OR** dextrose bolus, glucagon (rDNA), dextrose, perflutren lipid microspheres  Continuous Infusions:   sodium chloride      sodium chloride      dextrose         Intake/Output Summary (Last 24 hours) at 2/19/2023 0831  Last data filed at 2/19/2023 0600  Gross per 24 hour   Intake 720 ml   Output 1400 ml   Net -680 ml       Lab Data:  CBC:   Lab Results   Component Value Date/Time    WBC 6.3 02/19/2023 04:50 AM    HGB 11.2 02/19/2023 04:50 AM     02/19/2023 04:50 AM     BMP:  Lab Results   Component Value Date/Time     02/19/2023 04:50 AM    K 4.4 02/19/2023 04:50 AM     02/19/2023 04:50 AM    CO2 25 02/19/2023 04:50 AM    BUN 42 02/19/2023 04:50 AM    CREATININE 1.6 02/19/2023 04:50 AM    GLUCOSE 114 02/19/2023 04:50 AM INR:   Lab Results   Component Value Date/Time    INR 1.16 02/08/2023 06:03 AM        CARDIAC LABS  ENZYMES:No results for input(s): CKMB, CKMBINDEX, TROPONINI in the last 72 hours. Invalid input(s): CKTOTAL;3  FASTING LIPID PANEL:  Lab Results   Component Value Date/Time    HDL 43 02/08/2023 06:03 AM    LDLCALC 45 02/08/2023 06:03 AM    TRIG 69 02/08/2023 06:03 AM    TSH 5.90 02/08/2023 06:03 AM     LIVER PROFILE:  Lab Results   Component Value Date/Time    AST 18 02/09/2023 05:46 AM    ALT 16 02/09/2023 05:46 AM     BNP:   Lab Results   Component Value Date/Time    PROBNP 4,437 02/18/2023 04:20 AM    PROBNP 4,489 02/13/2023 09:49 AM    PROBNP 5,672 02/10/2023 04:50 AM     Iron Studies:    Lab Results   Component Value Date/Time    FERRITIN 145.8 02/09/2023 05:45 AM     Lab Results   Component Value Date    IRON 43 (L) 02/09/2023    TIBC 260 02/09/2023    FERRITIN 145.8 02/09/2023      Iron Deficiency Anemia:  Yes IV Iron Therapy:  Yes  2017 ACC/AHA HF Guidelines:   intravenous iron replacement in patients with New York Heart Association (NYHA) class II and III HF and iron deficiency(ferritin <100 ng/ml or 100-300 ng/ml if transferrin saturation <20%), to improve functional status and QoL. 1. WEIGHT: Admit Weight: (!) 308 lb 6.4 oz (139.9 kg)      Today  Weight: 291 lb 0.1 oz (132 kg)   2.  I/O   Intake/Output Summary (Last 24 hours) at 2/19/2023 0831  Last data filed at 2/19/2023 0600  Gross per 24 hour   Intake 720 ml   Output 1400 ml   Net -680 ml         Assessment/Plan:     Acute Heart Failure:  ~compensated, 10L out   ~ BNP 7417>6684>5840>2789  ~Plan: continue po lasix,  Monitor I&O's, Daily weights, Pt education    Cardiomyopathy: New  ~Echo:  EF: 25-30%   Core measures for HF:  ACEi/ARB/ARNI: none for CKD, RAY, consider starting after [facility] if ok with nephrology  BB: Carvedilol  Brian: CKD  SGLT2i: CKD  ~Device: none    HTN:   ~controlled on coreg    CAD:   ~no chest pain  ~Meds:   Statin: lipitor  BB: Carvedilol  Antiplt: ASA, Plavix  ~Plan:s/p LHC, plan for staged PCI this week, NPO after MN                       RAY on CKD   ~Cr on admit 1.6>1.8>1.9>1.7>1.6  ~Daily labs; trend creatinine  ~Nephrology consulted  GILDA   ~s/p Venofer       All questions and concerns were addressed to the patient. Alternatives to my treatment were discussed. I have discussed the above stated plan with patient and the nurse. The patient verbalized understanding and agreed with the plan.     I appreciate the opportunity of cooperating in the care of this individual.    Rory Murguia, APRN - CNP, ACNP, [address] 2/19/2023, 8:31 AM  Heart Failure  The [address], [facility]  Ph: [phone]      Core Measures:   Discharge instructions:   LVEF documented:   ACEI for LV dysfunction:   Smoking Cessation:

## 2023-02-19 NOTE — PROGRESS NOTES
[facility] PROGRESS NOTE    2/19/2023 7:54 AM        Name: Jacqueline Patton            Admitted: 2/8/2023  Primary Care Provider: No primary care provider on file. (Tel: None)      Subjective:    Jacqueline Patton is a 69 y.o. male with a past medical history of hypertension, hyperlipidemia, DM2, DKD3, and CAD s/p remote CABG who presented from [facility] with chest pressure and SOB with accompanying edema. He has had poor cardiac follow up in the past.  He was started on lasix by PCP without improvement. Troponin 0.45 in ER an he was transferred to [facility] for NSTEMI and acute CHF. S/p [facility] 2/17 recommends PCI of SVG-OM1, possible AV malformation    Interval History: Today, he continues to feel well. Continues with swelling BLE, compression stockings in place. Denies SOB, CP, or orthopnea. No concerns with GI/. Last normal BM today. Cr 1.6, tolerating oral lasix. Independently reviewed interval ancillary notes from cardiology and nephrology . Problem List  Principal Problem:    Acute systolic congestive heart failure (HCC)  Active Problems:    Coronary artery disease due to calcified coronary lesion    Stage 3 chronic kidney disease (HCC)    Type 2 diabetes mellitus with stage 3 chronic kidney disease, without long-term current use of insulin (HCC)    Edema    Hyperlipidemia    Iron deficiency anemia    Acute on chronic systolic heart failure (HCC)    Electrolyte imbalance    Primary hypertension    Cardiomyopathy ([address]    Acute kidney injury superimposed on CKD ([address]  Resolved Problems:    * No resolved hospital problems.  *     Assessment and Plan:    Acute Combined CHF   - Improved, appears compensated   - Echo with EF 25-30% with GIIDD   - On lasix 40 mg daily PO   - Strict I&O, daily weight, and fluid restiction  Cardiomyopathy   - New problem, EF 25-30% with global hypokinesis more pronounced in the inferior and septal walls   - S/p [facility] 2/17 and needs PCI staged for Monday  CKD 3   - cr remains stable today 1.6 (baseline 1.6)   - Nephrology following Dr. Val Rao, appreciate recs for diuretics  NSTEMI/CAD   - Remote hx of CABG   - [facility] 2/17; Needs staged PCI this admission  DM2    - A1C 6.8   - On Levemir, actos, and nateglinide at home    - Reviewed BG , increase Lantus 20 units BID   - No Actos at discharge as can contribute to LE swelling  Hypertension   - Controlled, continue current medications,   Hyperlipidemia   - LDL 45, continue with statin   Normocytic Anemia    - Hgb 11.2 with low iron, received IV Venofer    - Needs OP colonoscopy screening   - CBC stable  BPH   - No acute concern for obstruction, continue home Flomax    - Staged PCI, early next week  - Elevated EDP, continue lasix 40 mg daily or per nephrology recs    Discussed care with patient and nursing  Discussed case, assessment and plan of care with Dr. Carolyn Meza: ADULT DIET;  Regular; Low Fat/Low Chol/High Fiber/2 gm Na  Code:Full Code  DVT PPX: Lovenox PPx    Disposition: Home without needs when medically able, likely needs 2 additional days for diuresis and further testing    Current Medications  furosemide (LASIX) tablet 40 mg, Daily  aspirin EC tablet 81 mg, Daily  sodium chloride flush 0.9 % injection 5-40 mL, 2 times per day  sodium chloride flush 0.9 % injection 5-40 mL, PRN  0.9 % sodium chloride infusion, PRN  acetaminophen (TYLENOL) tablet 650 mg, Q4H PRN  clopidogrel (PLAVIX) tablet 75 mg, Daily  insulin glargine (LANTUS) injection vial 15 Units, BID  sodium chloride flush 0.9 % injection 10 mL, 2 times per day  sodium chloride flush 0.9 % injection 10 mL, PRN  0.9 % sodium chloride infusion, PRN  ondansetron (ZOFRAN-ODT) disintegrating tablet 4 mg, Q8H PRN   Or  ondansetron (ZOFRAN) injection 4 mg, Q6H PRN  senna (SENOKOT) tablet 8.6 mg, Daily PRN  acetaminophen (TYLENOL) suppository 650 mg, Q6H PRN  enoxaparin Sodium (LOVENOX) injection 30 mg, BID  potassium chloride (KLOR-CON M) extended release tablet 40 mEq, PRN   Or  potassium bicarb-citric acid (EFFER-K) effervescent tablet 40 mEq, PRN   Or  potassium chloride 10 mEq/100 mL IVPB (Peripheral Line), PRN  magnesium sulfate 2000 mg in 50 mL IVPB premix, PRN  atorvastatin (LIPITOR) tablet 40 mg, Daily  tamsulosin (FLOMAX) capsule 0.8 mg, Nightly  insulin lispro (HUMALOG) injection vial 0-4 Units, TID WC  insulin lispro (HUMALOG) injection vial 0-4 Units, Nightly  dextrose bolus 10% 125 mL, PRN   Or  dextrose bolus 10% 250 mL, PRN  glucagon (rDNA) injection 1 mg, PRN  dextrose 10 % infusion, Continuous PRN  perflutren lipid microspheres (DEFINITY) injection 1.5 mL, ONCE PRN  carvedilol (COREG) tablet 3.125 mg, BID       Objective:  BP (!) 117/52   Pulse 71   Temp 98.2 °F (36.8 °C) (Temporal)   Resp 18   Ht 6' 1\" (1.854 m)   Wt 291 lb 0.1 oz (132 kg)   SpO2 95%   BMI 38.39 kg/m²   Vitals:    02/19/23 0450   BP: (!) 117/52   Pulse: 71   Resp: 18   Temp: 98.2 °F (36.8 °C)   SpO2: 95%       Intake/Output Summary (Last 24 hours) at 2/19/2023 0754  Last data filed at 2/19/2023 0600  Gross per 24 hour   Intake 720 ml   Output 1400 ml   Net -680 ml        Wt Readings from Last 3 Encounters:   02/19/23 291 lb 0.1 oz (132 kg)       Review of Systems:  Constitutional: No fever, Yes sudden weight changes, No weakness  Skin: No excessive bruising, No bleeding, No changes in skin pigment, normal turgor  Respiratory: Yes SOB, No cough, No wheezing, No recent URI  Cardiovascular: Negative for CP, palpitations, dizziness, or syncope  Gastrointestinal: No abdominal pain, No nausea, No vomiting, No diarrhea, No constipation, No black/tarry stools  Genito-Urinary: No hematuria, No dysuria, No polyuria,   Musculoskeletal: No pain, No swelling, No new mobility concerns  Endocrine: No excessive sweating   Neurological/Psych: No for confusion, No TIA-like symptoms. Denies any acute depression, anxiety, or insomnia.       Physical Examination:  Telemetry: Personally Reviewed Normal sinus rhythm, IVCD, PVCs occasional  Constitutional: Cooperative and in no apparent distress, appears well nourished, Yes obesity  Skin: Warm and pink; no cyanosis, bruising, or clubbing, No lesions/incisions, BLE enzo   HEENT: Symmetric and normocephalic. Conjunctiva pink with clear sclera. Mucus membranes pink and moist.   Cardiovascular: regular rate and rhythm. S1 & S2, negative for murmurs. Peripheral pulses 2+, Yes 1+ BLE edema, compression stockings  Respiratory: Respirations symmetric and unlabored. Lungs clear to auscultation bilaterally, no wheezing, crackles, or rhonchi + diminished BLL  Gastrointestinal: Abdomen soft and round. normal bowel sounds. No tenderness  Musculoskeletal: No focal weakness, muscle strength 5/5 bilaterally  Neurologic/Psych: Awake and orientated to person, place and time. Calm affect, appropriate mood. Pertinent labs, diagnostic, and imaging results reviewed as a part of this visit    Labs and Tests:  CBC:   Recent Labs     02/18/23  0420 02/19/23  0450   WBC 6.1 6.3   HGB 11.1* 11.2*    138       BMP:    Recent Labs     02/17/23  0431 02/18/23  0420 02/19/23  0450    139 139   K 4.2 4.2 4.4    106 106   CO2 29 28 25   BUN 55* 48* 42*   CREATININE 1.7* 1.7* 1.6*   GLUCOSE 153* 151* 114*       Hepatic: No results for input(s): AST, ALT, ALB, BILITOT, ALKPHOS in the last 72 hours. Relevant results:  CXR 2/8/2023:  Mild cardiomegaly with findings of pulmonary edema. Echo 2/9/2023:  Summary   Left ventricular size is moderately increased. Left ventricular systolic function is severely depressed with ejection   fraction estimated at 25-30%. Global hypokinesis with hypokinesis more pronounced in the inferior and septal walls. There is mild concentric left ventricular hypertrophy. Grade II diastolic dysfunction with elevated LV filling pressures. Mild to moderate mitral regurgitation.    The left atrium is mildly dilated. Moderate tricuspid regurgitation. Systolic pulmonary artery pressure (SPAP) is normal and estimated at 30 mmHg (right atrial pressure 8 mmHg).     ZION Zelaya - CNP   2/19/2023 7:54 AM

## 2023-02-20 DIAGNOSIS — I25.10 CORONARY ARTERY DISEASE DUE TO LIPID RICH PLAQUE: Primary | ICD-10-CM

## 2023-02-20 DIAGNOSIS — I25.83 CORONARY ARTERY DISEASE DUE TO LIPID RICH PLAQUE: Primary | ICD-10-CM

## 2023-02-20 LAB
ANION GAP SERPL CALCULATED.3IONS-SCNC: 9 MMOL/L (ref 3–16)
BUN BLDV-MCNC: 39 MG/DL (ref 7–20)
CALCIUM SERPL-MCNC: 8.3 MG/DL (ref 8.3–10.6)
CHLORIDE BLD-SCNC: 107 MMOL/L (ref 99–110)
CO2: 24 MMOL/L (ref 21–32)
CREAT SERPL-MCNC: 1.7 MG/DL (ref 0.8–1.3)
GFR SERPL CREATININE-BSD FRML MDRD: 41 ML/MIN/{1.73_M2}
GLUCOSE BLD-MCNC: 119 MG/DL (ref 70–99)
GLUCOSE BLD-MCNC: 125 MG/DL (ref 70–99)
GLUCOSE BLD-MCNC: 127 MG/DL (ref 70–99)
GLUCOSE BLD-MCNC: 185 MG/DL (ref 70–99)
GLUCOSE BLD-MCNC: 197 MG/DL (ref 70–99)
HCT VFR BLD CALC: 33.9 % (ref 40.5–52.5)
HEMOGLOBIN: 11.1 G/DL (ref 13.5–17.5)
MCH RBC QN AUTO: 30.2 PG (ref 26–34)
MCHC RBC AUTO-ENTMCNC: 32.8 G/DL (ref 31–36)
MCV RBC AUTO: 91.9 FL (ref 80–100)
PDW BLD-RTO: 13.6 % (ref 12.4–15.4)
PERFORMED ON: ABNORMAL
PLATELET # BLD: 131 K/UL (ref 135–450)
PMV BLD AUTO: 10.4 FL (ref 5–10.5)
POC ACT LR: 201 SEC
POC ACT LR: 237 SEC
POC ACT LR: 311 SEC
POC ACT LR: 384 SEC
POTASSIUM SERPL-SCNC: 4.8 MMOL/L (ref 3.5–5.1)
RBC # BLD: 3.69 M/UL (ref 4.2–5.9)
SODIUM BLD-SCNC: 140 MMOL/L (ref 136–145)
WBC # BLD: 5.7 K/UL (ref 4–11)

## 2023-02-20 PROCEDURE — 027034Z DILATION OF CORONARY ARTERY, ONE ARTERY WITH DRUG-ELUTING INTRALUMINAL DEVICE, PERCUTANEOUS APPROACH: ICD-10-PCS | Performed by: INTERNAL MEDICINE

## 2023-02-20 PROCEDURE — 6370000000 HC RX 637 (ALT 250 FOR IP): Performed by: INTERNAL MEDICINE

## 2023-02-20 PROCEDURE — 99152 MOD SED SAME PHYS/QHP 5/>YRS: CPT

## 2023-02-20 PROCEDURE — C1725 CATH, TRANSLUMIN NON-LASER: HCPCS

## 2023-02-20 PROCEDURE — 99152 MOD SED SAME PHYS/QHP 5/>YRS: CPT | Performed by: INTERNAL MEDICINE

## 2023-02-20 PROCEDURE — 92928 PRQ TCAT PLMT NTRAC ST 1 LES: CPT | Performed by: INTERNAL MEDICINE

## 2023-02-20 PROCEDURE — 2500000003 HC RX 250 WO HCPCS

## 2023-02-20 PROCEDURE — C9604 PERC D-E COR REVASC T CABG S: HCPCS

## 2023-02-20 PROCEDURE — 93005 ELECTROCARDIOGRAM TRACING: CPT | Performed by: INTERNAL MEDICINE

## 2023-02-20 PROCEDURE — C1769 GUIDE WIRE: HCPCS

## 2023-02-20 PROCEDURE — C1887 CATHETER, GUIDING: HCPCS

## 2023-02-20 PROCEDURE — 6360000004 HC RX CONTRAST MEDICATION: Performed by: INTERNAL MEDICINE

## 2023-02-20 PROCEDURE — 6360000002 HC RX W HCPCS

## 2023-02-20 PROCEDURE — 99153 MOD SED SAME PHYS/QHP EA: CPT

## 2023-02-20 PROCEDURE — 2580000003 HC RX 258: Performed by: INTERNAL MEDICINE

## 2023-02-20 PROCEDURE — 85027 COMPLETE CBC AUTOMATED: CPT

## 2023-02-20 PROCEDURE — 2000000000 HC ICU R&B

## 2023-02-20 PROCEDURE — 99233 SBSQ HOSP IP/OBS HIGH 50: CPT | Performed by: INTERNAL MEDICINE

## 2023-02-20 PROCEDURE — 6360000002 HC RX W HCPCS: Performed by: INTERNAL MEDICINE

## 2023-02-20 PROCEDURE — 85347 COAGULATION TIME ACTIVATED: CPT

## 2023-02-20 PROCEDURE — 92937 PRQ TRLUML REVSC CAB GRF 1: CPT | Performed by: INTERNAL MEDICINE

## 2023-02-20 PROCEDURE — C1874 STENT, COATED/COV W/DEL SYS: HCPCS

## 2023-02-20 PROCEDURE — 80048 BASIC METABOLIC PNL TOTAL CA: CPT

## 2023-02-20 PROCEDURE — C1894 INTRO/SHEATH, NON-LASER: HCPCS

## 2023-02-20 RX ORDER — ACETAMINOPHEN 325 MG/1
650 TABLET ORAL EVERY 4 HOURS PRN
Status: DISCONTINUED | OUTPATIENT
Start: 2023-02-20 | End: 2023-02-21 | Stop reason: HOSPADM

## 2023-02-20 RX ORDER — SODIUM CHLORIDE 9 MG/ML
INJECTION, SOLUTION INTRAVENOUS CONTINUOUS
Status: ACTIVE | OUTPATIENT
Start: 2023-02-20 | End: 2023-02-20

## 2023-02-20 RX ORDER — SODIUM CHLORIDE 0.9 % (FLUSH) 0.9 %
5-40 SYRINGE (ML) INJECTION PRN
Status: DISCONTINUED | OUTPATIENT
Start: 2023-02-20 | End: 2023-02-21 | Stop reason: HOSPADM

## 2023-02-20 RX ORDER — SODIUM CHLORIDE 0.9 % (FLUSH) 0.9 %
5-40 SYRINGE (ML) INJECTION EVERY 12 HOURS SCHEDULED
Status: DISCONTINUED | OUTPATIENT
Start: 2023-02-20 | End: 2023-02-21 | Stop reason: HOSPADM

## 2023-02-20 RX ORDER — SODIUM CHLORIDE 9 MG/ML
INJECTION, SOLUTION INTRAVENOUS PRN
Status: DISCONTINUED | OUTPATIENT
Start: 2023-02-20 | End: 2023-02-21 | Stop reason: HOSPADM

## 2023-02-20 RX ADMIN — IOPAMIDOL 48 ML: 755 INJECTION, SOLUTION INTRAVENOUS at 16:27

## 2023-02-20 RX ADMIN — SODIUM CHLORIDE: 9 INJECTION, SOLUTION INTRAVENOUS at 08:52

## 2023-02-20 RX ADMIN — SODIUM CHLORIDE: 9 INJECTION, SOLUTION INTRAVENOUS at 17:02

## 2023-02-20 RX ADMIN — CARVEDILOL 3.12 MG: 3.12 TABLET, FILM COATED ORAL at 08:48

## 2023-02-20 RX ADMIN — TAMSULOSIN HYDROCHLORIDE 0.8 MG: 0.4 CAPSULE ORAL at 19:32

## 2023-02-20 RX ADMIN — CLOPIDOGREL BISULFATE 75 MG: 75 TABLET ORAL at 08:45

## 2023-02-20 RX ADMIN — ENOXAPARIN SODIUM 30 MG: 100 INJECTION SUBCUTANEOUS at 19:32

## 2023-02-20 RX ADMIN — ATORVASTATIN CALCIUM 40 MG: 40 TABLET, FILM COATED ORAL at 08:45

## 2023-02-20 RX ADMIN — INSULIN GLARGINE 20 UNITS: 100 INJECTION, SOLUTION SUBCUTANEOUS at 19:32

## 2023-02-20 RX ADMIN — Medication 10 ML: at 08:53

## 2023-02-20 RX ADMIN — ASPIRIN 81 MG: 81 TABLET, COATED ORAL at 08:45

## 2023-02-20 RX ADMIN — CARVEDILOL 3.12 MG: 3.12 TABLET, FILM COATED ORAL at 17:04

## 2023-02-20 RX ADMIN — ENOXAPARIN SODIUM 30 MG: 100 INJECTION SUBCUTANEOUS at 08:46

## 2023-02-20 ASSESSMENT — PAIN SCALES - GENERAL
PAINLEVEL_OUTOF10: 0

## 2023-02-20 ASSESSMENT — PAIN DESCRIPTION - DESCRIPTORS: DESCRIPTORS: ACHING

## 2023-02-20 ASSESSMENT — PAIN DESCRIPTION - ORIENTATION: ORIENTATION: RIGHT

## 2023-02-20 ASSESSMENT — PAIN DESCRIPTION - LOCATION: LOCATION: LEG

## 2023-02-20 NOTE — PLAN OF CARE
Problem: Discharge Planning  Goal: Discharge to home or other facility with appropriate resources  Outcome: Progressing  Flowsheets (Taken 2/20/2023 0930)  Discharge to home or other facility with appropriate resources: Identify barriers to discharge with patient and caregiver     Problem: Safety - Adult  Goal: Free from fall injury  Outcome: Progressing

## 2023-02-20 NOTE — PRE SEDATION
Brief Pre-Op Note/Sedation Assessment      Jordis Scales  1945  [id number]  11:54 AM    Planned Procedure: Cardiac Catheterization Procedure  Post Procedure Plan: Return to same level of care  Consent: I have discussed with the patient and/or the patient representative the indication, alternatives, and the possible risks and/or complications of the planned procedure and the anesthesia methods. The patient and/or patient representative appear to understand and agree to proceed. Chief Complaint:   Anginal Equivalent  Dyspnea on Exertion  Dyspnea      Indications for Cath Procedure:  Presentation:  Cardiomyopathy and LV Dysfunction  2. Anginal Classification within 2 weeks:  No symptoms  3. Angina Symptoms Assessment:  Asymptomatic  4. Heart Failure Class within last 2 weeks:  Yes:  Heart Failure Type: Systolic Severity:  Class IV - Symptoms of HF at rest  5. Cardiovascular Instability:  No    Prior Ischemic Workup/Eval:  Pre-Procedural Medications: Yes: Aspirin, Beta Blockers, and STATIN  2. Stress Test Completed? No    Does Patient need surgery? Cath Valve Surgery:  No    Pre-Procedure Medical History:  Vital Signs:  /74   Pulse 65   Temp 98 °F (36.7 °C) (Temporal)   Resp 18   Ht 6' 1\" (1.854 m)   Wt 292 lb 8.8 oz (132.7 kg)   SpO2 99%   BMI 38.60 kg/m²     Allergies:     Allergies   Allergen Reactions    Latex      Medications:    Current Facility-Administered Medications   Medication Dose Route Frequency Provider Last Rate Last Admin    0.9 % sodium chloride infusion   IntraVENous Continuous Franco Arredondo MD 75 mL/hr at 02/20/23 0852 New Bag at 02/20/23 0852    insulin glargine (LANTUS) injection vial 20 Units  20 Units SubCUTAneous BID ZION Balderas CNP   20 Units at 02/19/23 0841    insulin lispro (HUMALOG) injection vial 0-4 Units  0-4 Units SubCUTAneous TID  Farideh Bermudez MD        insulin lispro (HUMALOG) injection vial 0-4 Units  0-4 Units SubCUTAneous Nightly Carloz Razo MD        [facility]] furosemide (LASIX) tablet 40 mg  40 mg Oral Daily Stef White MD   40 mg at 02/19/23 0840    aspirin EC tablet 81 mg  81 mg Oral Daily Rubén Aylaa MD   81 mg at 02/20/23 0845    sodium chloride flush 0.9 % injection 5-40 mL  5-40 mL IntraVENous 2 times per day Rubén Ayala MD   10 mL at 02/20/23 0853    sodium chloride flush 0.9 % injection 5-40 mL  5-40 mL IntraVENous PRN Rubén Ayala MD        0.9 % sodium chloride infusion   IntraVENous PRN Rubén Ayala MD        acetaminophen (TYLENOL) tablet 650 mg  650 mg Oral Q4H PRN Rubén Ayala MD        clopidogrel (PLAVIX) tablet 75 mg  75 mg Oral Daily Rubén Ayala MD   75 mg at 02/20/23 0845    sodium chloride flush 0.9 % injection 10 mL  10 mL IntraVENous 2 times per day Rubén Ayala MD   10 mL at 02/19/23 2040    sodium chloride flush 0.9 % injection 10 mL  10 mL IntraVENous PRN Rubén Ayala MD   10 mL at 02/11/23 1752    0.9 % sodium chloride infusion   IntraVENous PRN Rubén Ayala MD        ondansetron (ZOFRAN-ODT) disintegrating tablet 4 mg  4 mg Oral Q8H PRN Rubén Ayala MD        Or    ondansetron [facility]) injection 4 mg  4 mg IntraVENous Q6H PRN Rubén Ayala MD        senna (SENOKOT) tablet 8.6 mg  1 tablet Oral Daily PRN Rubén Ayala MD   8.6 mg at 02/16/23 0827    acetaminophen (TYLENOL) suppository 650 mg  650 mg Rectal Q6H PRN Rubén Ayala MD        enoxaparin Sodium (LOVENOX) injection 30 mg  30 mg SubCUTAneous BID Rubén Ayala MD   30 mg at 02/20/23 0846    potassium chloride (KLOR-CON M) extended release tablet 40 mEq  40 mEq Oral PRN Rubén Ayala MD   40 mEq at 02/09/23 1456    Or    potassium bicarb-citric acid (EFFER-K) effervescent tablet 40 mEq  40 mEq Oral PRN Rubén Ayala MD        Or    potassium chloride 10 mEq/100 mL IVPB (Peripheral Line)  10 mEq IntraVENous PRN Rubén Ayala MD        magnesium sulfate 2000 mg in 50 mL IVPB premix  2,000 mg IntraVENous PRN Radha Melendez MD        atorvastatin (LIPITOR) tablet 40 mg  40 mg Oral Daily Radha Melendez MD   40 mg at 02/20/23 0845    tamsulosin (FLOMAX) capsule 0.8 mg  0.8 mg Oral Nightly Radha Melendez MD   0.8 mg at 02/19/23 2040    dextrose bolus 10% 125 mL  125 mL IntraVENous PRN Radha Melendez MD        Or    dextrose bolus 10% 250 mL  250 mL IntraVENous PRN Radha Melendez MD        glucagon (rDNA) injection 1 mg  1 mg SubCUTAneous PRN Radha Melendez MD        dextrose 10 % infusion   IntraVENous Continuous PRN Radha Melendez MD        perflutren lipid microspheres (DEFINITY) injection 1.5 mL  1.5 mL IntraVENous ONCE PRN Radha Melendez MD        carvedilol (COREG) tablet 3.125 mg  3.125 mg Oral BID WC Radha Melendez MD   3.125 mg at 02/20/23 0848       Past Medical History:  History reviewed. No pertinent past medical history. Surgical History:  No past surgical history on file. Pre-Sedation:  Pre-Sedation Documentation and Exam:  I have assessed the patient and reviewed the H&P on the chart. Prior History of Anesthesia Complications:   none    Modified Mallampati:  II (soft palate, uvula, fauces visible)    ASA Classification:  Class 3 - A patient with severe systemic disease that limits activity but is not incapacitating    Gabino Scale: Activity:  2 - Able to move 4 extremities voluntarily on command  Respiration:  2 - Able to breathe deeply and cough freely  Circulation:  2 - BP+/- 20mmHg of normal  Consciousness:  2 - Fully awake  Oxygen Saturation (color):  2 - Able to maintain oxygen saturation >92% on room air    Sedation/Anesthesia Plan:  Guard the patient's safety and welfare. Minimize physical discomfort and pain. Minimize negative psychological responses to treatment by providing sedation and analgesia and maximize the potential amnesia. Patient to meet pre-procedure discharge plan.     Medication Planned:  midazolam intravenously and fentanyl intravenously    Patient is an appropriate candidate for plan of sedation:   yes    Discussed performance of the procedure as well as the associated risk, benefits and alternatives with the patient. Given CABG history grafts unknown, additional contrast required. Patient would be at at least moderate if not moderate to high, risk for contrast-induced nephropathy. Good and pertinent questions asked and answered.     Electronically signed by Ted Carter MD on 2/20/2023 at 11:54 AM

## 2023-02-20 NOTE — BRIEF OP NOTE
Cardiac Cath/PCI 2/20/2023:  Access: Right RA  Ultrasound: Ultrasound guidance used to determine after mentioned artery patency, size (> 2 mm), anatomic variations and ideal puncture location. Real-time ultrasound utilized concurrent with vascular needle entry into the artery. Images permanently recorded and reported in the patient chart. Hemostasis: TR band    Moderate Sedation:  Start time: 1448  Stop time: 1614  4.5 mg versed   200 mcg fentanyl   An independent trained observer pushed medications at my direction. We monitored the patient's level of consciousness and vital signs/physiologic status throughout the procedure duration (see start and start times above). Bleeding risk: Intermediate  LVEDP: - mmHg  AO: 113/51 mmHg  Estimated blood loss: Less than 20 mL  Contrast: 48 mL  Fluoroscopy time: 24.8 min. Anatomy:   PCI: SVG-OM1: 99% to 0% distal with a 3.0 mm x 38 mm Xience Zee YUN postdilated with a 3.5 mm NC balloon to 3.5 mm distally in the stent and 3.75 mm proximally in the stent. Impression:  1. Critical stenosis of SVG-OM1 which was successfully revascularized with YUN x1. Plan:  1. DAPT for minimum 6 months and preferably 12 months. 2.  Gentle hydration. 3.  Trend BUN and creatinine. 4.  Can resume losartan in 48 hours or when nephrology recommends.

## 2023-02-20 NOTE — PROGRESS NOTES
Pt back from cath lab. A&O. RA. No sign of bleeding. Site clean and dry. Denies pain. Instructed not to put pressure on site. Agreeable to instruction. Will continue to monitor.

## 2023-02-20 NOTE — PROGRESS NOTES
[facility]    2/20/2023 8:47 AM        Name: Alfredo More            Admitted: 2/8/2023  Primary Care Provider: No primary care provider on file. (Tel: None)      Subjective:    Alfredo More is a 69 y.o. male with a past medical history of hypertension, hyperlipidemia, DM2, DKD3, and CAD s/p remote CABG who presented from [facility] with chest pressure and SOB with accompanying edema. He has had poor cardiac follow up in the past.  He was started on lasix by PCP without improvement. Troponin 0.45 in ER an he was transferred to [facility] for NSTEMI and acute CHF. S/p [facility] 2/17 recommends PCI of SVG-OM1, possible AV malformation    Interval History: Ovi Hernandez is feeling well, admits he is more tired today. Cr 1.7 today. No worsening swelling. Denies CP, SOB, or orthopnea. Wearing compression stockings. NPO for [facility] planned for later today, he is hungry. Independently reviewed interval ancillary notes from cardiology and nephrology . Problem List  Principal Problem:    Acute systolic congestive heart failure (HCC)  Active Problems:    Coronary artery disease due to calcified coronary lesion    Stage 3 chronic kidney disease (HCC)    Type 2 diabetes mellitus with stage 3 chronic kidney disease, without long-term current use of insulin (HCC)    Edema    Hyperlipidemia    Iron deficiency anemia    Acute on chronic systolic heart failure (HCC)    Electrolyte imbalance    Primary hypertension    Cardiomyopathy ([address]    Acute kidney injury superimposed on CKD ([address]  Resolved Problems:    * No resolved hospital problems.  *     Assessment and Plan:    Acute Combined CHF   - Improved, appears compensated   - Echo with EF 25-30% with GIIDD   - On lasix 40 mg daily PO   - Strict I&O, daily weight, and fluid restiction  Cardiomyopathy   - New problem, EF 25-30% with global hypokinesis more pronounced in the inferior and septal walls   - S/p [facility] 2/17 and needs PCI staged for Monday  CKD 3   - cr remains stable today 1.6 (baseline 1.6)   - Nephrology following Dr. Shruthi Osborn, appreciate recs for diuretics  NSTEMI/CAD   - Remote hx of CABG   - [facility] 2/17;  Needs staged PCI this admission  DM2    - A1C 6.8   - On Levemir, actos, and nateglinide at home    - Reviewed BG , increase Lantus 20 units BID   - No Actos at discharge as can contribute to LE swelling  Hypertension   - Controlled, continue current medications,   Hyperlipidemia   - LDL 45, continue with statin   Normocytic Anemia    - Hgb 11.2 with low iron, received IV Venofer    - Needs OP colonoscopy screening   - CBC stable  BPH   - No acute concern for obstruction, continue home Flomax    - Staged PCI later today with Dr. Mac Almeida   - Continue lasix 40 mg daily or per nephrology recs    Discussed care with patient and nursing  Discussed case, assessment and plan of care with Dr. Kaley Jackson    Diet: Diet NPO Exceptions are: [place] with Meds  Code:Full Code  DVT PPX: Lovenox PPx    Disposition: Home without needs when medically able, likely needs 2 additional days for diuresis and further testing    Current Medications  0.9 % sodium chloride infusion, Continuous  insulin glargine (LANTUS) injection vial 20 Units, BID  insulin lispro (HUMALOG) injection vial 0-4 Units, TID WC  insulin lispro (HUMALOG) injection vial 0-4 Units, Nightly  [Held by provider] furosemide (LASIX) tablet 40 mg, Daily  aspirin EC tablet 81 mg, Daily  sodium chloride flush 0.9 % injection 5-40 mL, 2 times per day  sodium chloride flush 0.9 % injection 5-40 mL, PRN  0.9 % sodium chloride infusion, PRN  acetaminophen (TYLENOL) tablet 650 mg, Q4H PRN  clopidogrel (PLAVIX) tablet 75 mg, Daily  sodium chloride flush 0.9 % injection 10 mL, 2 times per day  sodium chloride flush 0.9 % injection 10 mL, PRN  0.9 % sodium chloride infusion, PRN  ondansetron (ZOFRAN-ODT) disintegrating tablet 4 mg, Q8H PRN   Or  ondansetron (ZOFRAN) injection 4 mg, Q6H PRN  senna (SENOKOT) tablet 8.6 mg, Daily PRN  acetaminophen (TYLENOL) suppository 650 mg, Q6H PRN  enoxaparin Sodium (LOVENOX) injection 30 mg, BID  potassium chloride (KLOR-CON M) extended release tablet 40 mEq, PRN   Or  potassium bicarb-citric acid (EFFER-K) effervescent tablet 40 mEq, PRN   Or  potassium chloride 10 mEq/100 mL IVPB (Peripheral Line), PRN  magnesium sulfate 2000 mg in 50 mL IVPB premix, PRN  atorvastatin (LIPITOR) tablet 40 mg, Daily  tamsulosin (FLOMAX) capsule 0.8 mg, Nightly  dextrose bolus 10% 125 mL, PRN   Or  dextrose bolus 10% 250 mL, PRN  glucagon (rDNA) injection 1 mg, PRN  dextrose 10 % infusion, Continuous PRN  perflutren lipid microspheres (DEFINITY) injection 1.5 mL, ONCE PRN  carvedilol (COREG) tablet 3.125 mg, BID WC      Objective:  BP (!) 132/58   Pulse 71   Temp 98.5 °F (36.9 °C) (Temporal)   Resp 20   Ht 6' 1\" (1.854 m)   Wt 292 lb 8.8 oz (132.7 kg)   SpO2 98%   BMI 38.60 kg/m²   Vitals:    02/20/23 0400   BP: (!) 132/58   Pulse:    Resp: 20   Temp: 98.5 °F (36.9 °C)   SpO2: 98%       Intake/Output Summary (Last 24 hours) at 2/20/2023 0847  Last data filed at 2/19/2023 2000  Gross per 24 hour   Intake 600 ml   Output 1300 ml   Net -700 ml        Wt Readings from Last 3 Encounters:   02/20/23 292 lb 8.8 oz (132.7 kg)       Review of Systems:  Constitutional: No fever, Yes sudden weight changes, No weakness  Skin: No excessive bruising, No bleeding, No changes in skin pigment, normal turgor  Respiratory: Yes SOB, No cough, No wheezing, No recent URI  Cardiovascular: Negative for CP, palpitations, dizziness, or syncope  Gastrointestinal: No abdominal pain, No nausea, No vomiting, No diarrhea, No constipation, No black/tarry stools  Genito-Urinary: No hematuria, No dysuria, No polyuria,   Musculoskeletal: No pain, No swelling, No new mobility concerns  Endocrine: No excessive sweating   Neurological/Psych: No for confusion, No TIA-like symptoms.  Denies any acute depression, anxiety, or insomnia. Physical Examination:  Telemetry: Personally Reviewed Normal sinus rhythm, IVCD, PVCs occasional  Constitutional: Cooperative and in no apparent distress, appears well nourished, Yes obesity  Skin: Warm and pink; no cyanosis, bruising, or clubbing, No lesions/incisions, BLE enzo   HEENT: Symmetric and normocephalic. Conjunctiva pink with clear sclera. Mucus membranes pink and moist.   Cardiovascular: regular rate and rhythm. S1 & S2, negative for murmurs. Peripheral pulses 2+, Yes 1+ BLE edema, compression stockings  Respiratory: Respirations symmetric and unlabored. Lungs clear to auscultation bilaterally, no wheezing, crackles, or rhonchi + diminished BLL  Gastrointestinal: Abdomen soft and round. normal bowel sounds. No tenderness  Musculoskeletal: No focal weakness, muscle strength 5/5 bilaterally  Neurologic/Psych: Awake and orientated to person, place and time. Calm affect, appropriate mood. Pertinent labs, diagnostic, and imaging results reviewed as a part of this visit    Labs and Tests:  CBC:   Recent Labs     02/18/23 0420 02/19/23 0450 02/20/23 0450   WBC 6.1 6.3 5.7   HGB 11.1* 11.2* 11.1*    138 131*       BMP:    Recent Labs     02/18/23 0420 02/19/23 0450 02/20/23 0450    139 140   K 4.2 4.4 4.8    106 107   CO2 28 25 24   BUN 48* 42* 39*   CREATININE 1.7* 1.6* 1.7*   GLUCOSE 151* 114* 185*       Hepatic: No results for input(s): AST, ALT, ALB, BILITOT, ALKPHOS in the last 72 hours. Relevant results:  CXR 2/8/2023:  Mild cardiomegaly with findings of pulmonary edema. Echo 2/9/2023:  Summary   Left ventricular size is moderately increased. Left ventricular systolic function is severely depressed with ejection   fraction estimated at 25-30%. Global hypokinesis with hypokinesis more pronounced in the inferior and septal walls. There is mild concentric left ventricular hypertrophy.    Grade II diastolic dysfunction with elevated LV filling pressures. Mild to moderate mitral regurgitation. The left atrium is mildly dilated. Moderate tricuspid regurgitation. Systolic pulmonary artery pressure (SPAP) is normal and estimated at 30 mmHg (right atrial pressure 8 mmHg).     ZION Gee - CNP   2/20/2023 8:47 AM

## 2023-02-20 NOTE — PROGRESS NOTES
[facility] PROGRESS NOTE    2/21/2023 8:07 AM        Name: Ashley Andino . Admitted: 2/8/2023  Primary Care Provider: No primary care provider on file. (Tel: None)      Brief History: 68 yo male with hx CAD/CABG, HTN, HLD, DM2, CKD stage III. He presented to [facility] with chest pressure, shortness of breath, edema. He was recently started on Lasix by PCP with no improvement. Troponin was noted to be elevated (0.45). He was transferred to [facility] for NSTEMI and acute CHF. Subjective:  Resting in bed, states he feels \"great. \" Has been up in room, denies chest pain, shortness of breath, lightheadedness, palpitations. Hopeful for possible DC today.      Reviewed interval ancillary notes    Current Medications  sodium chloride flush 0.9 % injection 5-40 mL, 2 times per day  sodium chloride flush 0.9 % injection 5-40 mL, PRN  0.9 % sodium chloride infusion, PRN  acetaminophen (TYLENOL) tablet 650 mg, Q4H PRN  insulin glargine (LANTUS) injection vial 20 Units, BID  insulin lispro (HUMALOG) injection vial 0-4 Units, TID WC  insulin lispro (HUMALOG) injection vial 0-4 Units, Nightly  [Held by provider] furosemide (LASIX) tablet 40 mg, Daily  aspirin EC tablet 81 mg, Daily  sodium chloride flush 0.9 % injection 5-40 mL, 2 times per day  sodium chloride flush 0.9 % injection 5-40 mL, PRN  0.9 % sodium chloride infusion, PRN  acetaminophen (TYLENOL) tablet 650 mg, Q4H PRN  clopidogrel (PLAVIX) tablet 75 mg, Daily  sodium chloride flush 0.9 % injection 10 mL, 2 times per day  sodium chloride flush 0.9 % injection 10 mL, PRN  0.9 % sodium chloride infusion, PRN  ondansetron (ZOFRAN-ODT) disintegrating tablet 4 mg, Q8H PRN   Or  ondansetron (ZOFRAN) injection 4 mg, Q6H PRN  senna (SENOKOT) tablet 8.6 mg, Daily PRN  acetaminophen (TYLENOL) suppository 650 mg, Q6H PRN  enoxaparin Sodium (LOVENOX) injection 30 mg, BID  potassium chloride (KLOR-CON M) extended release tablet 40 mEq, PRN   Or  potassium bicarb-citric acid (EFFER-K) effervescent tablet 40 mEq, PRN   Or  potassium chloride 10 mEq/100 mL IVPB (Peripheral Line), PRN  magnesium sulfate 2000 mg in 50 mL IVPB premix, PRN  atorvastatin (LIPITOR) tablet 40 mg, Daily  tamsulosin (FLOMAX) capsule 0.8 mg, Nightly  dextrose bolus 10% 125 mL, PRN   Or  dextrose bolus 10% 250 mL, PRN  glucagon (rDNA) injection 1 mg, PRN  dextrose 10 % infusion, Continuous PRN  perflutren lipid microspheres (DEFINITY) injection 1.5 mL, ONCE PRN  carvedilol (COREG) tablet 3.125 mg, BID WC      Objective:  /76   Pulse 70   Temp 98.2 °F (36.8 °C) (Temporal)   Resp 18   Ht 6' 1\" (1.854 m)   Wt 283 lb 4.7 oz (128.5 kg)   SpO2 98%   BMI 37.38 kg/m²     Intake/Output Summary (Last 24 hours) at 2/21/2023 0807  Last data filed at 2/21/2023 1441  Gross per 24 hour   Intake 1046.96 ml   Output 1800 ml   Net -753.04 ml      Wt Readings from Last 3 Encounters:   02/21/23 283 lb 4.7 oz (128.5 kg)     General:  Awake, alert, oriented in NAD  Skin:  Warm and dry. Chronic discoloration BLE  Neck:  Supple. No JVD appreciated  Chest:  Normal effort. Clear to auscultation, no wheezes/rhonchi/rales  Cardiovascular:  RRR, normal S1/S2, no murmur/gallop/rub, right radial arteriotomy site soft, non tender, without hematoma.   Abdomen:  Soft, nontender, +bowel sounds  Extremities:  No edema  Neurological: No focal deficits  Psychological: Normal mood and affect      Labs and Tests:  CBC:   Recent Labs     02/19/23 0450 02/20/23 0450 02/21/23  0335   WBC 6.3 5.7 5.5   HGB 11.2* 11.1* 11.1*    131* 122*     BMP:    Recent Labs     02/19/23 0450 02/20/23 0450 02/21/23  0335    140 139   K 4.4 4.8 4.5    107 107   CO2 25 24 26   BUN 42* 39* 35*   CREATININE 1.6* 1.7* 1.6*   GLUCOSE 114* 185* 151*     Hepatic:   No results for input(s): AST, ALT, ALB, BILITOT, ALKPHOS in the last 72 hours.    CXR 2/8/2023:  Mild cardiomegaly with findings of pulmonary edema. Echo 2/9/2023:  Summary   Left ventricular size is moderately increased. Left ventricular systolic function is severely depressed with ejection   fraction estimated at 25-30%. Global hypokinesis with hypokinesis more pronounced in the inferior and septal walls. There is mild concentric left ventricular hypertrophy. Grade II diastolic dysfunction with elevated LV filling pressures. Mild to moderate mitral regurgitation. The left atrium is mildly dilated. Moderate tricuspid regurgitation. Systolic pulmonary artery pressure (SPAP) is normal and estimated at 30 mmHg (right atrial pressure 8 mmHg). [facility] 2/17/2023:  Anatomy:   LM-100% prox  RCA-100% prox, left to right collaterals  LIMA-LAD: widely patent  SVG-OM 1: 95% distal  SVG-RCA: 100% ostial  LVEF- not done due to renal insufficiency  Aortic root: Non-aneurysmal, no significant AI, 1 SVG visualized. Note: Area of vascularity noted on chest imaging suggestive of AV malformation. Impression:  Severe native CAD. Widely patent LIMA-LAD. Severely stenosed SVG-OM1. Occluded SVG-RCA. Elevated LVEDP. Possible AV malformation. Plan:  1. Plan staged PCI of SVG-OM1, renal function permitting, early next week. 2.  Given elevated LVEDP, will not hydrate. Further management per nephrology in terms of renal optimization. 3.  Plavix 75 mg daily starting tomorrow. 4.  Clinical correlation regarding possibly AV malformation. PCI 2/20/2023:  Anatomy:   PCI: SVG-OM1: 99% to 0% distal with a 3.0 mm x 38 mm Xience Zee YUN postdilated with a 3.5 mm NC balloon to 3.5 mm distally in the stent and 3.75 mm proximally in the stent. Impression:  1. Critical stenosis of SVG-OM1 which was successfully revascularized with YUN x1. Plan:  1. DAPT for minimum 6 months and preferably 12 months. 2.  Gentle hydration. 3.  Trend BUN and creatinine.   4.  Can resume losartan in 48 hours or when nephrology recommends. Problem List  Principal Problem:    Acute systolic congestive heart failure (HCC)  Active Problems:    Coronary artery disease due to calcified coronary lesion    Stage 3 chronic kidney disease (HCC)    Type 2 diabetes mellitus with stage 3 chronic kidney disease, without long-term current use of insulin (HCC)    Edema    Hyperlipidemia    Iron deficiency anemia    Acute on chronic systolic heart failure (HCC)    Electrolyte imbalance    Primary hypertension    Cardiomyopathy ([facility] [address]    Acute kidney injury superimposed on CKD ([facility] [address]  Resolved Problems:    * No resolved hospital problems. *       Assessment & Plan:   Acute s/dCHF. Presents with edema and worsening dyspnea. CXR with pulmonary edema, proBNP 7200. Echo reveals EF 25-30%, grade II diastolic dysfunction. Started on IV Lasix with good response, transitioned to po Lasix 40 mg daily. Appreciate cardiology and nephrology recs. NSTEMI / CAD / New onset cardiomyopathy. Known CAD with CABG 32 years ago. Echo this admission with EF 25-30% with global hypokinesis more pronounced in inferior and septal walls. LHC 2/17 revealed severe native CAD, widely patent LIMA-LAD, severely stenosed SVG-OM1, occluded SVG-RCA. Underwent PCI 2/20 with YUN placed SVG-OM1. Continue DAPT minimum 6 mos and preferably 12 mos, statin, carvedilol. Hold ACE/ARB/ARNI for now considering CKD and contrast with angiogram.   CKD stage III. Creatinine 1.6 on presentation, stable at ~ 1.6, receiving gentle hydration post intervention yesterday. Appreciate nephrology recs. DM2. A1c 6.8. Takes Levemir, Actos and nateglinide at home. Being covered with Lantus and low dose correction. Reviewed BG values, 119-197, continue current management. Recommend not resuming Actos on DC as it can worsen peripheral edema. HTN. Controlled. Continue carvedilol. Continue to follow. HLD. , LDL 45. Continue statin. Normocytic anemia.  Hgb 11.5, iron level low, received IV Venofer. Never had colonoscopy. Stool occult blood negative. Likely component of CKD. Recommend GI follow up as outpatient for screening colonoscopy. Disposition: Anticipate home likely later today or tomorrow, await nephrology recs. PT/OT evaluated (2/8) and no needs on DC. Diet: ADULT DIET; Regular; 4 carb choices (60 gm/meal);  Low Fat/Low Chol/High Fiber/2 gm Na  Code:Full Code  DVT PPX: enoxaparin      ZION Hamilton - CNP   2/21/2023 8:08 AM

## 2023-02-20 NOTE — PROGRESS NOTES
[facility] Interventional Cardiology Daily Progress Note      Admit Date:  2/8/2023    Chief Complaint: CAD    Subjective:  Mr. Elizabeth Chavis . He denies chest discomfort or shortness of breath.     Objective:   BP (!) 132/58   Pulse 71   Temp 98.5 °F (36.9 °C) (Temporal)   Resp 20   Ht 6' 1\" (1.854 m)   Wt 292 lb 8.8 oz (132.7 kg)   SpO2 98%   BMI 38.60 kg/m²     Intake/Output Summary (Last 24 hours) at 2/20/2023 0759  Last data filed at 2/19/2023 2000  Gross per 24 hour   Intake 840 ml   Output 1500 ml   Net -660 ml       Physical Exam:  General:  Awake, alert, oriented x 3, NAD  Skin:  Warm and dry  Neck:  JVD 7 cm  Chest:  normal air entry  Cardiovascular:  RRR S1S2, no S3, no significant murmur  Abdomen:  Soft, ND, NT, No HSM  Extremities: Trace to 1+ edema    Medications:    insulin glargine  20 Units SubCUTAneous BID    insulin lispro  0-4 Units SubCUTAneous TID WC    insulin lispro  0-4 Units SubCUTAneous Nightly    [Held by provider] furosemide  40 mg Oral Daily    aspirin  81 mg Oral Daily    sodium chloride flush  5-40 mL IntraVENous 2 times per day    clopidogrel  75 mg Oral Daily    sodium chloride flush  10 mL IntraVENous 2 times per day    enoxaparin  30 mg SubCUTAneous BID    atorvastatin  40 mg Oral Daily    tamsulosin  0.8 mg Oral Nightly    carvedilol  3.125 mg Oral BID WC      sodium chloride      sodium chloride      dextrose       sodium chloride flush, sodium chloride, acetaminophen, sodium chloride flush, sodium chloride, ondansetron **OR** ondansetron, senna, [DISCONTINUED] acetaminophen **OR** acetaminophen, potassium chloride **OR** potassium alternative oral replacement **OR** potassium chloride, magnesium sulfate, dextrose bolus **OR** dextrose bolus, glucagon (rDNA), dextrose, perflutren lipid microspheres    TELEMETRY (Personally reviewed by me): Sinus     Lab Data:  CBC:   Recent Labs     02/18/23  0420 02/19/23  0450 02/20/23  0450   WBC 6.1 6.3 5.7   HGB 11.1* 11.2* 11.1*   HCT 33.5* 34.0* 33.9*   MCV 91.7 93.0 91.9    138 131*     BMP:   Recent Labs     02/18/23  0420 02/19/23  0450 02/20/23  0450    139 140   K 4.2 4.4 4.8    106 107   CO2 28 25 24   BUN 48* 42* 39*   CREATININE 1.7* 1.6* 1.7*     LIVER PROFILE: No results for input(s): AST, ALT, LIPASE, BILIDIR, BILITOT, ALKPHOS in the last 72 hours. Invalid input(s): AMYLASE,  ALB  PT/INR: No results for input(s): PROTIME, INR in the last 72 hours. APTT: No results for input(s): APTT in the last 72 hours. BNP:  No results for input(s): BNP in the last 72 hours. Imaging/Procedures:       Cardiac Cath 2/17/2023:  Access: Right CFA   Hemostasis: Angioseal closure device  Note: High access point above inferior epigastric artery which had a low takeoff. Moderate Sedation:  Start time: 1557  Stop time: 1647  2 mg versed   75 mcg fentanyl   An independent trained observer pushed medications at my direction. We monitored the patient's level of consciousness and vital signs/physiologic status throughout the procedure duration (see start and start times above). Bleeding risk: low  LVEDP: 18 mmHg  AO: 110/46 mmHg  Estimated blood loss: less than 20 mL  Contrast: 78 mL  Fluoroscopy time: 10.3 min. Anatomy:   LM-100% prox  RCA-100% prox, left to right collaterals  LIMA-LAD: widely patent  SVG-OM 1: 95% distal  SVG-RCA: 100% ostial  LVEF- not done due to renal insufficiency  Aortic root: Non-aneurysmal, no significant AI, 1 SVG visualized. Note: Area of vascularity noted on chest imaging suggestive of AV malformation. Impression:  Severe native CAD. Widely patent LIMA-LAD. Severely stenosed SVG-OM1. Occluded SVG-RCA. Elevated LVEDP. Possible AV malformation. Plan:  1. Plan staged PCI of SVG-OM1, renal function permitting, early next week. 2.  Given elevated LVEDP, will not hydrate. Further management per nephrology in terms of renal optimization.   3.  Plavix 75 mg daily starting tomorrow. 4.  Clinical correlation regarding possibly AV malformation. Emanated    Assessment/Plan:  Principal Problem:    Acute systolic congestive heart failure ([address]  Plan: Near compensated. Active Problems:    Coronary artery disease due to calcified coronary lesion  Plan: Critical stenosis of SVG to OM1. Would benefit from staged PCI. Stage 3 chronic kidney disease ([address]  Plan: Acute component resolved. So far no worsening postcontrast load on Friday. Will discuss with nephrology. Type 2 diabetes mellitus with stage 3 chronic kidney disease, without long-term current use of insulin ([address]  Plan: Per primary service. Cardiomyopathy ([address]  Plan: Ischemic. Acute kidney injury superimposed on CKD ([address]  Plan: Much improved and now back to baseline. So far no worsening from contrast load 2/17/2023. Discussed with nephrology, okay to proceed with PCI today. Recommendation of normal saline at 75 ml/hour for total of 1 L.         Maritza Duarte MD, MD 2/20/2023 7:59 AM

## 2023-02-20 NOTE — PROGRESS NOTES
Nephrology  Note                                                                                                                                                                                                                                                                                                                                                               Office : [phone]     Fax :[phone]              Patient's Name: Dequan Liang  10:35 AM  2/20/2023        Feels fine   No SOB  Creatinine level stable                            I/O last 3 completed shifts: In: 1560 [P.O.:1560]  Out: 2500 [Urine:2500]  No intake/output data recorded. reports that he quit smoking about 29 years ago. His smoking use included cigarettes. He quit smokeless tobacco use about 12 years ago. His smokeless tobacco use included chew. He reports that he does not drink alcohol and does not use drugs.     Allergies:  Latex    Current Medications:    0.9 % sodium chloride infusion, Continuous  insulin glargine (LANTUS) injection vial 20 Units, BID  insulin lispro (HUMALOG) injection vial 0-4 Units, TID WC  insulin lispro (HUMALOG) injection vial 0-4 Units, Nightly  [Held by provider] furosemide (LASIX) tablet 40 mg, Daily  aspirin EC tablet 81 mg, Daily  sodium chloride flush 0.9 % injection 5-40 mL, 2 times per day  sodium chloride flush 0.9 % injection 5-40 mL, PRN  0.9 % sodium chloride infusion, PRN  acetaminophen (TYLENOL) tablet 650 mg, Q4H PRN  clopidogrel (PLAVIX) tablet 75 mg, Daily  sodium chloride flush 0.9 % injection 10 mL, 2 times per day  sodium chloride flush 0.9 % injection 10 mL, PRN  0.9 % sodium chloride infusion, PRN  ondansetron (ZOFRAN-ODT) disintegrating tablet 4 mg, Q8H PRN   Or  ondansetron (ZOFRAN) injection 4 mg, Q6H PRN  senna (SENOKOT) tablet 8.6 mg, Daily PRN  acetaminophen (TYLENOL) suppository 650 mg, Q6H PRN  enoxaparin Sodium (LOVENOX) injection 30 mg, BID  potassium chloride (KLOR-CON M) extended release tablet 40 mEq, PRN   Or  potassium bicarb-citric acid (EFFER-K) effervescent tablet 40 mEq, PRN   Or  potassium chloride 10 mEq/100 mL IVPB (Peripheral Line), PRN  magnesium sulfate 2000 mg in 50 mL IVPB premix, PRN  atorvastatin (LIPITOR) tablet 40 mg, Daily  tamsulosin (FLOMAX) capsule 0.8 mg, Nightly  dextrose bolus 10% 125 mL, PRN   Or  dextrose bolus 10% 250 mL, PRN  glucagon (rDNA) injection 1 mg, PRN  dextrose 10 % infusion, Continuous PRN  perflutren lipid microspheres (DEFINITY) injection 1.5 mL, ONCE PRN  carvedilol (COREG) tablet 3.125 mg, BID WC        Physical exam:     Vitals:  /74   Pulse 65   Temp 98 °F (36.7 °C) (Temporal)   Resp 18   Ht 6' 1\" (1.854 m)   Wt 292 lb 8.8 oz (132.7 kg)   SpO2 99%   BMI 38.60 kg/m²   Constitutional:  OAA X3 NAD  Skin: no rash, turgor wnl  Heent:  eomi, mmm  Neck: no bruits or jvd noted  Cardiovascular:  S1, S2 without m/r/g  Respiratory: CTA B without w/r/r  Abdomen:  +bs, soft, nt, nd  Ext: + lower extremity edema  Psychiatric: mood and affect appropriate  Musculoskeletal:  Rom, muscular strength intact    Data:   Labs:  CBC:   Recent Labs     02/18/23 0420 02/19/23 0450 02/20/23 0450   WBC 6.1 6.3 5.7   HGB 11.1* 11.2* 11.1*    138 131*     BMP:    Recent Labs     02/18/23 0420 02/19/23 0450 02/20/23  0450    139 140   K 4.2 4.4 4.8    106 107   CO2 28 25 24   BUN 48* 42* 39*   CREATININE 1.7* 1.6* 1.7*   GLUCOSE 151* 114* 185*     Ca/Mg/Phos:   Recent Labs     02/18/23 0420 02/19/23 0450 02/20/23  0450   CALCIUM 9.0 8.7 8.3     Hepatic:   No results for input(s): AST, ALT, ALB, BILITOT, ALKPHOS in the last 72 hours. Troponin:   No results for input(s): TROPONINI in the last 72 hours. BNP: No results for input(s): BNP in the last 72 hours. Lipids:   No results for input(s): CHOL, TRIG, HDL, LDLCALC, LABVLDL in the last 72 hours.     ABGs: No results for input(s): PHART, PO2ART, NUN3ANS in the last 72 hours.  INR:   No results for input(s): INR in the last 72 hours. UA:No results for input(s): Laren Dollar, GLUCOSEU, BILIRUBINUR, Tom Samaritan, BLOODU, PHUR, PROTEINU, UROBILINOGEN, NITRU, LEUKOCYTESUR, LABMICR, URINETYPE in the last 72 hours. Urine Microscopic: No results for input(s): LABCAST, BACTERIA, COMU, HYALCAST, WBCUA, RBCUA, EPIU in the last 72 hours. Urine Culture: No results for input(s): LABURIN in the last 72 hours. Urine Chemistry:   No results for input(s): Bevely Bolds, PROTEINUR, NAUR in the last 72 hours. IMAGING:  XR CHEST PORTABLE   Final Result   Mild cardiomegaly with findings of pulmonary edema. Assessment/Plan   CKD 3     2. HTN    3. Anemia    4. Acid- base/ Electrolyte imbalance     5.  CHF       - daily weights   - monitor UO and renal function   - labs in am         Planned for staged intervention on Monday       Use minimal contrast possible   Low to moderate range risk for JAYCOB     Hold lasix today   Gentle fluids   Recommend to dose adjust all medications  based on renal functions  Maintain SBP> 90 mmHg   Daily weights   AVOID NSAIDs  Avoid Nephrotoxins  Monitor Intake/Output  Call if significant decrease in urine output                   Thank you for allowing us to participate in care of Radha Workman MD  Feel free to contact me   [facility] [address]  Office : [phone]  Fax :[phone]

## 2023-02-21 VITALS
SYSTOLIC BLOOD PRESSURE: 119 MMHG | HEART RATE: 70 BPM | WEIGHT: 283.29 LBS | TEMPERATURE: 98.6 F | RESPIRATION RATE: 18 BRPM | OXYGEN SATURATION: 98 % | DIASTOLIC BLOOD PRESSURE: 76 MMHG | BODY MASS INDEX: 37.55 KG/M2 | HEIGHT: 73 IN

## 2023-02-21 LAB
ANION GAP SERPL CALCULATED.3IONS-SCNC: 6 MMOL/L (ref 3–16)
BUN BLDV-MCNC: 35 MG/DL (ref 7–20)
CALCIUM SERPL-MCNC: 8.7 MG/DL (ref 8.3–10.6)
CHLORIDE BLD-SCNC: 107 MMOL/L (ref 99–110)
CHOLESTEROL, TOTAL: 91 MG/DL (ref 0–199)
CO2: 26 MMOL/L (ref 21–32)
CREAT SERPL-MCNC: 1.6 MG/DL (ref 0.8–1.3)
EKG ATRIAL RATE: 61 BPM
EKG DIAGNOSIS: NORMAL
EKG P AXIS: -14 DEGREES
EKG P-R INTERVAL: 80 MS
EKG Q-T INTERVAL: 476 MS
EKG QRS DURATION: 150 MS
EKG QTC CALCULATION (BAZETT): 479 MS
EKG R AXIS: -48 DEGREES
EKG T AXIS: 28 DEGREES
EKG VENTRICULAR RATE: 61 BPM
GFR SERPL CREATININE-BSD FRML MDRD: 44 ML/MIN/{1.73_M2}
GLUCOSE BLD-MCNC: 151 MG/DL (ref 70–99)
GLUCOSE BLD-MCNC: 82 MG/DL (ref 70–99)
HCT VFR BLD CALC: 34.3 % (ref 40.5–52.5)
HDLC SERPL-MCNC: 28 MG/DL (ref 40–60)
HEMOGLOBIN: 11.1 G/DL (ref 13.5–17.5)
LDL CHOLESTEROL CALCULATED: 49 MG/DL
MCH RBC QN AUTO: 29.8 PG (ref 26–34)
MCHC RBC AUTO-ENTMCNC: 32.4 G/DL (ref 31–36)
MCV RBC AUTO: 92.2 FL (ref 80–100)
PDW BLD-RTO: 13.7 % (ref 12.4–15.4)
PERFORMED ON: NORMAL
PLATELET # BLD: 122 K/UL (ref 135–450)
PMV BLD AUTO: 9.9 FL (ref 5–10.5)
POTASSIUM SERPL-SCNC: 4.5 MMOL/L (ref 3.5–5.1)
RBC # BLD: 3.72 M/UL (ref 4.2–5.9)
SODIUM BLD-SCNC: 139 MMOL/L (ref 136–145)
TRIGL SERPL-MCNC: 72 MG/DL (ref 0–150)
VLDLC SERPL CALC-MCNC: 14 MG/DL
WBC # BLD: 5.5 K/UL (ref 4–11)

## 2023-02-21 PROCEDURE — 2580000003 HC RX 258: Performed by: INTERNAL MEDICINE

## 2023-02-21 PROCEDURE — 93010 ELECTROCARDIOGRAM REPORT: CPT | Performed by: INTERNAL MEDICINE

## 2023-02-21 PROCEDURE — 6370000000 HC RX 637 (ALT 250 FOR IP): Performed by: INTERNAL MEDICINE

## 2023-02-21 PROCEDURE — 80061 LIPID PANEL: CPT

## 2023-02-21 PROCEDURE — 6360000002 HC RX W HCPCS: Performed by: INTERNAL MEDICINE

## 2023-02-21 PROCEDURE — 85027 COMPLETE CBC AUTOMATED: CPT

## 2023-02-21 PROCEDURE — 80048 BASIC METABOLIC PNL TOTAL CA: CPT

## 2023-02-21 RX ORDER — INSULIN DETEMIR 100 [IU]/ML
20 INJECTION, SOLUTION SUBCUTANEOUS 2 TIMES DAILY
COMMUNITY
Start: 2023-02-21

## 2023-02-21 RX ORDER — CARVEDILOL 3.12 MG/1
3.12 TABLET ORAL 2 TIMES DAILY WITH MEALS
Qty: 60 TABLET | Refills: 3 | Status: SHIPPED | OUTPATIENT
Start: 2023-02-21

## 2023-02-21 RX ORDER — CLOPIDOGREL BISULFATE 75 MG/1
75 TABLET ORAL DAILY
Qty: 30 TABLET | Refills: 3 | Status: SHIPPED | OUTPATIENT
Start: 2023-02-21

## 2023-02-21 RX ORDER — TAMSULOSIN HYDROCHLORIDE 0.4 MG/1
0.8 CAPSULE ORAL NIGHTLY
Qty: 60 CAPSULE | Refills: 1 | Status: SHIPPED | OUTPATIENT
Start: 2023-02-21

## 2023-02-21 RX ORDER — ASPIRIN 81 MG/1
81 TABLET ORAL DAILY
Qty: 30 TABLET | Refills: 3 | Status: SHIPPED | OUTPATIENT
Start: 2023-02-21

## 2023-02-21 RX ADMIN — ASPIRIN 81 MG: 81 TABLET, COATED ORAL at 09:12

## 2023-02-21 RX ADMIN — CARVEDILOL 3.12 MG: 3.12 TABLET, FILM COATED ORAL at 08:19

## 2023-02-21 RX ADMIN — CLOPIDOGREL BISULFATE 75 MG: 75 TABLET ORAL at 09:12

## 2023-02-21 RX ADMIN — Medication 10 ML: at 09:14

## 2023-02-21 RX ADMIN — INSULIN GLARGINE 20 UNITS: 100 INJECTION, SOLUTION SUBCUTANEOUS at 09:12

## 2023-02-21 RX ADMIN — ATORVASTATIN CALCIUM 40 MG: 40 TABLET, FILM COATED ORAL at 09:12

## 2023-02-21 RX ADMIN — ENOXAPARIN SODIUM 30 MG: 100 INJECTION SUBCUTANEOUS at 09:12

## 2023-02-21 ASSESSMENT — PAIN SCALES - GENERAL
PAINLEVEL_OUTOF10: 0

## 2023-02-21 NOTE — PROGRESS NOTES
Discharge ok per Dr. Ligia Calzada. Patient aware. Patient is from [facility], has notified his family that he can be discharged today, they are a few hours away but will be coming to transport home.

## 2023-02-21 NOTE — DISCHARGE SUMMARY
[facility]    Patient Demographics    Nori Koo  Date of Birth. 1945  MRN. [id number]     Primary care provider. No primary care provider on file. (Tel: None)    Admit date: 2/8/2023    Discharge date (blank if same as Note Date): Note Date: 2/21/2023     Reason for Hospitalization. Chest pain, shortness of breath, edema        Significant Findings. Principal Problem:    Acute systolic congestive heart failure (HCC)  Active Problems:    Coronary artery disease due to calcified coronary lesion    Stage 3 chronic kidney disease (HCC)    Type 2 diabetes mellitus with stage 3 chronic kidney disease, without long-term current use of insulin (HCC)    Edema    Hyperlipidemia    Iron deficiency anemia    Acute on chronic systolic heart failure (HCC)    Electrolyte imbalance    Primary hypertension    Cardiomyopathy ([facility] [address]    Acute kidney injury superimposed on CKD ([facility] [address]  Resolved Problems:    * No resolved hospital problems. *       Problems and results from this hospitalization that need follow up. BMP in 1 week. Order in [facility]   CAD. Chronic combined s/dCHF. CKD stage III. Normocytic anemia. Recommend screening colonoscopy. Significant test results and incidental findings. CXR 2/8/2023:  Mild cardiomegaly with findings of pulmonary edema. Echo 2/9/2023:  Summary   Left ventricular size is moderately increased. Left ventricular systolic function is severely depressed with ejection   fraction estimated at 25-30%. Global hypokinesis with hypokinesis more pronounced in the inferior and septal walls. There is mild concentric left ventricular hypertrophy. Grade II diastolic dysfunction with elevated LV filling pressures. Mild to moderate mitral regurgitation. The left atrium is mildly dilated. Moderate tricuspid regurgitation.    Systolic pulmonary artery pressure (SPAP) is normal and estimated at 30 mmHg (right atrial pressure 8 mmHg). Invasive procedures and treatments. [facility] 2/17/2023:  Anatomy:   LM-100% prox  RCA-100% prox, left to right collaterals  LIMA-LAD: widely patent  SVG-OM 1: 95% distal  SVG-RCA: 100% ostial  LVEF- not done due to renal insufficiency  Aortic root: Non-aneurysmal, no significant AI, 1 SVG visualized. Note: Area of vascularity noted on chest imaging suggestive of AV malformation. Impression:  Severe native CAD. Widely patent LIMA-LAD. Severely stenosed SVG-OM1. Occluded SVG-RCA. Elevated LVEDP. Possible AV malformation. Plan:  1. Plan staged PCI of SVG-OM1, renal function permitting, early next week. 2.  Given elevated LVEDP, will not hydrate. Further management per nephrology in terms of renal optimization. 3.  Plavix 75 mg daily starting tomorrow. 4.  Clinical correlation regarding possibly AV malformation. PCI 2/20/2023:  Anatomy:   PCI: SVG-OM1: 99% to 0% distal with a 3.0 mm x 38 mm Xience Zee YUN postdilated with a 3.5 mm NC balloon to 3.5 mm distally in the stent and 3.75 mm proximally in the stent. Impression:  1. Critical stenosis of SVG-OM1 which was successfully revascularized with YUN x1. Plan:  1. DAPT for minimum 6 months and preferably 12 months. 2.  Gentle hydration. 3.  Trend BUN and creatinine. 4.  Can resume losartan in 48 hours or when nephrology recommends. [facility]. 68 yo male with hx CAD/CABG, HTN, HLD, DM2, CKD stage III. He presented to [facility] with chest pressure, shortness of breath, edema. He was recently started on Lasix by PCP with no improvement. Troponin was noted to be elevated (0.45). He was transferred to [facility] for NSTEMI and acute CHF. Acute s/dCHF. Presents with edema and worsening dyspnea. CXR with pulmonary edema, proBNP 7200. Echo reveals EF 25-30%, grade II diastolic dysfunction.  Started on IV Lasix with good response, transitioned to po Lasix 40 mg daily. Euvolemic on DC.  NSTEMI / CAD / new onset cardiomyopathy. Known CAD with CABG 32 years ago. Echo this admission with EF 25-30% with global hypokinesis more pronounced in inferior and septal walls. LHC 2/17 revealed severe native CAD, widely patent LIMA-LAD, severely stenosed SVG-OM1, occluded SVG-RCA. Underwent PCI 2/20 with YUN placed SVG-OM1. Continue DAPT minimum 6 mos and preferably 12 mos, statin, carvedilol. Hold ACE/ARB/ARNI for now considering CKD, consideration addition if renal function stable on follow up. CKD stage III. Creatinine 1.6 on presentation, stable at ~ 1.6, received gentle hydration post intervention. Recommend BMP in 1 week, nephrology follow up in 2 weeks. DM2. A1c 6.8. Takes Levemir, Actos and nateglinide at home. Covered with Lantus and low dose correction. Reviewed BG values, 119-197. Actos discontinued on DC as it can worsen peripheral edema. HTN. Controlled. Continued carvedilol. HLD. , LDL 45. Continued statin. Normocytic anemia. Hgb 11.5, iron level low, received IV Venofer. Never had colonoscopy. Stool occult blood negative. Likely component of CKD. Recommend GI follow up as outpatient for screening colonoscopy. Patient feels good, eager for DC home. Has been ambulatory in room, denies chest pain, shortness of breath. Reviewed DC plans, follow up and medications. Expresses understanding. Questions answered. PT/OT evaluated, patient at baseline. Consults. IP CONSULT TO CARDIOLOGY  IP CONSULT TO HEART FAILURE NURSE/COORDINATOR  IP CONSULT TO DIETITIAN  IP CONSULT TO NEPHROLOGY  IP CONSULT TO CARDIAC REHAB  IP CONSULT TO CARDIAC REHAB    Physical examination on discharge day. /76   Pulse 70   Temp 98.2 °F (36.8 °C) (Temporal)   Resp 18   Ht 6' 1\" (1.854 m)   Wt 283 lb 4.7 oz (128.5 kg)   SpO2 98%   BMI 37.38 kg/m²   General appearance. Alert. Looks comfortable. HEENT. Sclera clear.  Moist mucus membranes. Cardiovascular. Regular rate and rhythm, normal S1, S2. No murmur. Respiratory. Not using accessory muscles. Clear to auscultation bilaterally, no wheeze. Gastrointestinal. Abdomen soft, non-tender, not distended, normal bowel sounds  Neurology. Facial symmetry. No speech deficits. Moving all extremities equally. Extremities. Trace edema in lower extremities. Skin. Warm, dry, normal turgor    Condition at time of discharge stable    Medication instructions provided to patient at discharge.      Medication List        START taking these medications      aspirin 81 MG EC tablet  Take 1 tablet by mouth daily  Replaces: aspirin 325 MG tablet     carvedilol 3.125 MG tablet  Commonly known as: COREG  Take 1 tablet by mouth 2 times daily (with meals)     clopidogrel 75 MG tablet  Commonly known as: PLAVIX  Take 1 tablet by mouth daily            CHANGE how you take these medications      Levemir FlexTouch 100 UNIT/ML injection pen  Generic drug: insulin detemir  What changed:   medication strength  how much to take     tamsulosin 0.4 MG capsule  Commonly known as: FLOMAX  Take 2 capsules by mouth at bedtime  What changed:   how much to take  when to take this  additional instructions            CONTINUE taking these medications      atorvastatin 40 MG tablet  Commonly known as: LIPITOR     furosemide 40 MG tablet  Commonly known as: LASIX            STOP taking these medications      amLODIPine 5 MG tablet  Commonly known as: NORVASC     aspirin 325 MG tablet  Replaced by: aspirin 81 MG EC tablet     Hyzaar 50-12.5 MG per tablet  Generic drug: losartan-hydroCHLOROthiazide     nateglinide 120 MG tablet  Commonly known as: STARLIX     pioglitazone 45 MG tablet  Commonly known as: ACTOS               Where to Get Your Medications        These medications were sent to [facility], [place] [address] [address] [phone]  [address] [place] [address] Phone: [phone]   aspirin 81 MG EC tablet  carvedilol 3.125 MG tablet  clopidogrel 75 MG tablet  tamsulosin 0.4 MG capsule         Discharge recommendations given to patient. Follow Up. PCP in 1 week, nephrology in 1-2 weeks, cardiology in 1 week  Disposition. home  Activity. activity as tolerated  Diet: ADULT DIET; Regular; 4 carb choices (60 gm/meal); Low Fat/Low Chol/High Fiber/2 gm Na      Spent > 30 minutes in discharge process.     Signed:  ZION Alejandro CNP     2/21/2023 9:08 AM

## 2023-02-21 NOTE — PLAN OF CARE
Problem: Discharge Planning  Goal: Discharge to home or other facility with appropriate resources  Outcome: Completed  Flowsheets (Taken 2/21/2023 5047 by Pedrito Ferguson, RN)  Discharge to home or other facility with appropriate resources:   Identify barriers to discharge with patient and caregiver   Identify discharge learning needs (meds, wound care, etc)   Arrange for needed discharge resources and transportation as appropriate     Problem: Safety - Adult  Goal: Free from fall injury  Outcome: Completed     Problem: ABCDS Injury Assessment  Goal: Absence of physical injury  Outcome: Completed     Problem: Respiratory - Adult  Goal: Achieves optimal ventilation and oxygenation  Outcome: Completed  Flowsheets (Taken 2/21/2023 0338 by Pedrito Ferguson, RN)  Achieves optimal ventilation and oxygenation:   Assess for changes in respiratory status   Assess for changes in mentation and behavior   Position to facilitate oxygenation and minimize respiratory effort   Encourage broncho-pulmonary hygiene including cough, deep breathe, incentive spirometry   Assess and instruct to report shortness of breath or any respiratory difficulty     Problem: Cardiovascular - Adult  Goal: Maintains optimal cardiac output and hemodynamic stability  Outcome: Completed  Flowsheets (Taken 2/21/2023 0338 by Pedrito Ferguson, RN)  Maintains optimal cardiac output and hemodynamic stability:   Monitor blood pressure and heart rate   Monitor urine output and notify Licensed Independent Practitioner for values outside of normal range   Assess for signs of decreased cardiac output     Problem: Skin/Tissue Integrity - Adult  Goal: Skin integrity remains intact  Outcome: Completed  Flowsheets (Taken 2/21/2023 0338 by Pedrito Ferguson, RN)  Skin Integrity Remains Intact:   Monitor for areas of redness and/or skin breakdown   Assess vascular access sites hourly     Problem: Musculoskeletal - Adult  Goal: Return mobility to safest level of function  Outcome: Completed  Flowsheets (Taken 2/21/2023 308 by Iesha Mendosa RN)  Return Mobility to Safest Level of Function:   Assess patient stability and activity tolerance for standing, transferring and ambulating with or without assistive devices   Assist with transfers and ambulation using safe patient handling equipment as needed   Instruct patient/family in ordered activity level     Problem: Gastrointestinal - Adult  Goal: Maintains or returns to baseline bowel function  Outcome: Completed  Flowsheets (Taken 2/21/2023 0338 by Iesha Mendosa RN)  Maintains or returns to baseline bowel function:   Assess bowel function   Encourage oral fluids to ensure adequate hydration   Administer ordered medications as needed   Encourage mobilization and activity     Problem: Genitourinary - Adult  Goal: Absence of urinary retention  Outcome: Completed  Flowsheets (Taken 2/21/2023 0338 by Iesha Mendosa RN)  Absence of urinary retention:   Assess patients ability to void and empty bladder   Monitor intake/output and perform bladder scan as needed     Problem: Metabolic/Fluid and Electrolytes - Adult  Goal: Electrolytes maintained within normal limits  Outcome: Completed  Flowsheets (Taken 2/21/2023 0338 by Iesha Mendosa RN)  Electrolytes maintained within normal limits:   Monitor labs and assess patient for signs and symptoms of electrolyte imbalances   Administer electrolyte replacement as ordered   Monitor response to electrolyte replacements, including repeat lab results as appropriate     Problem: Pain  Goal: Verbalizes/displays adequate comfort level or baseline comfort level  Outcome: Completed     Problem: Chronic Conditions and Co-morbidities  Goal: Patient's chronic conditions and co-morbidity symptoms are monitored and maintained or improved  Outcome: Completed  Flowsheets (Taken 2/21/2023 0338 by Iesha Mendosa RN)  Care Plan - Patient's Chronic Conditions and Co-Morbidity Symptoms are Monitored and Maintained or Improved:   Monitor and assess patient's chronic conditions and comorbid symptoms for stability, deterioration, or improvement   Update acute care plan with appropriate goals if chronic or comorbid symptoms are exacerbated and prevent overall improvement and discharge   Collaborate with multidisciplinary team to address chronic and comorbid conditions and prevent exacerbation or deterioration

## 2023-02-21 NOTE — PLAN OF CARE
Problem: Safety - Adult  Goal: Free from fall injury  2/20/2023 2005 by Jim Harris RN  Outcome: Progressing  Note: Fall precautions in place, bed alarm on, nonskid foot wear applied, bed in lowest position, and call light within reach. Will continue to monitor. Problem: Cardiovascular - Adult  Goal: Maintains optimal cardiac output and hemodynamic stability  2/20/2023 2005 by Jim Harris RN  Outcome: Progressing  Flowsheets (Taken 2/20/2023 2005)  Maintains optimal cardiac output and hemodynamic stability:   Administer vasoactive medications as ordered   Administer fluid and/or volume expanders as ordered   Assess for signs of decreased cardiac output   Monitor urine output and notify Licensed Independent Practitioner for values outside of normal range   Monitor blood pressure and heart rate  Note: Pulse rate and rhythm, peripheral pulses, and capillary refill assessed every shift with assessment. General color and body temperature monitored throughout shift and with vitals. Assess for edema with head to toe assessment. Administer treatments and medications as ordered. Monitor patient's weight. Problem: Pain  Goal: Verbalizes/displays adequate comfort level or baseline comfort level  2/20/2023 2005 by Jim Harris RN  Outcome: Progressing  Flowsheets (Taken 2/20/2023 2005)  Verbalizes/displays adequate comfort level or baseline comfort level:   Notify Licensed Independent Practitioner if interventions unsuccessful or patient reports new pain   Consider cultural and social influences on pain and pain management   Implement non-pharmacological measures as appropriate and evaluate response   Administer analgesics based on type and severity of pain and evaluate response   Assess pain using appropriate pain scale   Encourage patient to monitor pain and request assistance  Note: Assessed pt's pain on 0 -10 scale.  Repositioned, provided emotional support, and administered pain medication. Call light within reach, will continue to monitor.

## 2023-02-21 NOTE — DISCHARGE SUMMARY
Patient discharge per orders. A&O. RA. VSS. Discharge instruction given. Pt verbalized understand of instruction. Piv out. Picked up by spouse and son.

## 2023-02-21 NOTE — PROGRESS NOTES
CLINICAL PHARMACY NOTE: MEDS TO BEDS    Total # of Prescriptions Filled: 3   The following medications were delivered to the patient:  Carvedilol 3.125 mg  Aspirin 81 mg  Plavix 75 mg    Additional Documentation:  Patient picked up from Outpatient Pharmacy=Signed  Pauline Gamez CPhT

## 2023-02-21 NOTE — PROGRESS NOTES
Nephrology  Note                                                                                                                                                                                                                                                                                                                                                               Office : [phone]     Fax :[phone]              Patient's Name: Florence De La Cruz  10:45 AM  2/21/2023        Feels fine   No SOB  Creatinine level stable                            I/O last 3 completed shifts: In: 4852 [P.O.:600; I.V.:807]  Out: 2100 [Urine:2100]  No intake/output data recorded. reports that he quit smoking about 29 years ago. His smoking use included cigarettes. He quit smokeless tobacco use about 12 years ago. His smokeless tobacco use included chew. He reports that he does not drink alcohol and does not use drugs.     Allergies:  Latex    Current Medications:    sodium chloride flush 0.9 % injection 5-40 mL, 2 times per day  sodium chloride flush 0.9 % injection 5-40 mL, PRN  0.9 % sodium chloride infusion, PRN  acetaminophen (TYLENOL) tablet 650 mg, Q4H PRN  insulin glargine (LANTUS) injection vial 20 Units, BID  insulin lispro (HUMALOG) injection vial 0-4 Units, TID WC  insulin lispro (HUMALOG) injection vial 0-4 Units, Nightly  [Held by provider] furosemide (LASIX) tablet 40 mg, Daily  aspirin EC tablet 81 mg, Daily  sodium chloride flush 0.9 % injection 5-40 mL, 2 times per day  sodium chloride flush 0.9 % injection 5-40 mL, PRN  0.9 % sodium chloride infusion, PRN  acetaminophen (TYLENOL) tablet 650 mg, Q4H PRN  clopidogrel (PLAVIX) tablet 75 mg, Daily  sodium chloride flush 0.9 % injection 10 mL, 2 times per day  sodium chloride flush 0.9 % injection 10 mL, PRN  0.9 % sodium chloride infusion, PRN  ondansetron (ZOFRAN-ODT) disintegrating tablet 4 mg, Q8H PRN   Or  ondansetron (ZOFRAN) injection 4 mg, Q6H PRN  senna (SENOKOT) tablet 8.6 mg, Daily PRN  acetaminophen (TYLENOL) suppository 650 mg, Q6H PRN  enoxaparin Sodium (LOVENOX) injection 30 mg, BID  potassium chloride (KLOR-CON M) extended release tablet 40 mEq, PRN   Or  potassium bicarb-citric acid (EFFER-K) effervescent tablet 40 mEq, PRN   Or  potassium chloride 10 mEq/100 mL IVPB (Peripheral Line), PRN  magnesium sulfate 2000 mg in 50 mL IVPB premix, PRN  atorvastatin (LIPITOR) tablet 40 mg, Daily  tamsulosin (FLOMAX) capsule 0.8 mg, Nightly  dextrose bolus 10% 125 mL, PRN   Or  dextrose bolus 10% 250 mL, PRN  glucagon (rDNA) injection 1 mg, PRN  dextrose 10 % infusion, Continuous PRN  perflutren lipid microspheres (DEFINITY) injection 1.5 mL, ONCE PRN  carvedilol (COREG) tablet 3.125 mg, BID WC        Physical exam:     Vitals:  /76   Pulse 70   Temp 98.2 °F (36.8 °C) (Temporal)   Resp 18   Ht 6' 1\" (1.854 m)   Wt 283 lb 4.7 oz (128.5 kg)   SpO2 98%   BMI 37.38 kg/m²   Constitutional:  OAA X3 NAD  Skin: no rash, turgor wnl  Heent:  eomi, mmm  Neck: no bruits or jvd noted  Cardiovascular:  S1, S2 without m/r/g  Respiratory: CTA B without w/r/r  Abdomen:  +bs, soft, nt, nd  Ext: + lower extremity edema  Psychiatric: mood and affect appropriate  Musculoskeletal:  Rom, muscular strength intact    Data:   Labs:  CBC:   Recent Labs     02/19/23 0450 02/20/23 0450 02/21/23  0335   WBC 6.3 5.7 5.5   HGB 11.2* 11.1* 11.1*    131* 122*     BMP:    Recent Labs     02/19/23 0450 02/20/23 0450 02/21/23  0335    140 139   K 4.4 4.8 4.5    107 107   CO2 25 24 26   BUN 42* 39* 35*   CREATININE 1.6* 1.7* 1.6*   GLUCOSE 114* 185* 151*     Ca/Mg/Phos:   Recent Labs     02/19/23 0450 02/20/23  0450 02/21/23  0335   CALCIUM 8.7 8.3 8.7     Hepatic:   No results for input(s): AST, ALT, ALB, BILITOT, ALKPHOS in the last 72 hours. Troponin:   No results for input(s): TROPONINI in the last 72 hours.     BNP: No results for input(s): BNP in the last 72 hours. Lipids:   No results for input(s): CHOL, TRIG, HDL, LDLCALC, LABVLDL in the last 72 hours. ABGs: No results for input(s): PHART, PO2ART, KSB9LTC in the last 72 hours. INR:   No results for input(s): INR in the last 72 hours. UA:No results for input(s): Rogenia Puller, GLUCOSEU, BILIRUBINUR, Christina Searing, BLOODU, PHUR, PROTEINU, UROBILINOGEN, NITRU, LEUKOCYTESUR, LABMICR, URINETYPE in the last 72 hours. Urine Microscopic: No results for input(s): LABCAST, BACTERIA, COMU, HYALCAST, WBCUA, RBCUA, EPIU in the last 72 hours. Urine Culture: No results for input(s): LABURIN in the last 72 hours. Urine Chemistry:   No results for input(s): Laretta Crystal, PROTEINUR, NAUR in the last 72 hours. IMAGING:  XR CHEST PORTABLE   Final Result   Mild cardiomegaly with findings of pulmonary edema. Assessment/Plan   CKD 3     2. HTN    3. Anemia    4. Acid- base/ Electrolyte imbalance     5. CHF         S/p C  Critical stenosis of SVG-OM1 which was successfully revascularized with YUN x1.      Creat stable     - daily weights   - monitor UO and renal function   - labs in am       - resume lasix 40 mg po daily           Recommend to dose adjust all medications  based on renal functions  Maintain SBP> 90 mmHg   Daily weights   AVOID NSAIDs  Avoid Nephrotoxins  Monitor Intake/Output  Call if significant decrease in urine output                   Thank you for allowing us to participate in care of Darcy Del Real MD  Feel free to contact me   [facility] [address]  Office : [phone]  Fax :[phone]

## 2023-02-23 ENCOUNTER — TELEPHONE (OUTPATIENT)
Dept: OTHER | Age: 78
End: 2023-02-23

## 2023-02-23 NOTE — TELEPHONE ENCOUNTER
[address] FAILURE PROGRAM  TELEPHONE ENCOUNTER FORM    Odilia Green 1945    Attempted to call patient for HF follow-up. No answer at this time.       Next MD/ Clinic appointment: 2/28 with cardiology      BI MANZANO RN 2/23/2023 3:16 PM

## 2023-02-24 ENCOUNTER — TELEPHONE (OUTPATIENT)
Dept: OTHER | Age: 78
End: 2023-02-24

## 2023-03-07 LAB
BUN BLDV-MCNC: 49 MG/DL
CALCIUM SERPL-MCNC: 9.3 MG/DL
CHLORIDE BLD-SCNC: 106 MMOL/L
CO2: 26 MMOL/L
CREAT SERPL-MCNC: 1.7 MG/DL
EGFR: 39
GLUCOSE BLD-MCNC: 165 MG/DL
POTASSIUM SERPL-SCNC: 4.6 MMOL/L
SODIUM BLD-SCNC: 139 MMOL/L

## 2023-03-21 ENCOUNTER — OFFICE VISIT (OUTPATIENT)
Dept: CARDIOLOGY CLINIC | Age: 78
End: 2023-03-21
Payer: MEDICARE

## 2023-03-21 VITALS
TEMPERATURE: 98.7 F | DIASTOLIC BLOOD PRESSURE: 67 MMHG | HEIGHT: 73 IN | WEIGHT: 281.8 LBS | HEART RATE: 63 BPM | BODY MASS INDEX: 37.35 KG/M2 | OXYGEN SATURATION: 98 % | SYSTOLIC BLOOD PRESSURE: 136 MMHG

## 2023-03-21 DIAGNOSIS — I25.10 CORONARY ARTERY DISEASE INVOLVING NATIVE CORONARY ARTERY OF NATIVE HEART WITHOUT ANGINA PECTORIS: ICD-10-CM

## 2023-03-21 DIAGNOSIS — I25.5 ISCHEMIC CARDIOMYOPATHY: ICD-10-CM

## 2023-03-21 DIAGNOSIS — Z98.61 S/P PTCA (PERCUTANEOUS TRANSLUMINAL CORONARY ANGIOPLASTY): Primary | ICD-10-CM

## 2023-03-21 PROCEDURE — 3078F DIAST BP <80 MM HG: CPT | Performed by: NURSE PRACTITIONER

## 2023-03-21 PROCEDURE — G8417 CALC BMI ABV UP PARAM F/U: HCPCS | Performed by: NURSE PRACTITIONER

## 2023-03-21 PROCEDURE — 99214 OFFICE O/P EST MOD 30 MIN: CPT | Performed by: NURSE PRACTITIONER

## 2023-03-21 PROCEDURE — 3075F SYST BP GE 130 - 139MM HG: CPT | Performed by: NURSE PRACTITIONER

## 2023-03-21 PROCEDURE — G8484 FLU IMMUNIZE NO ADMIN: HCPCS | Performed by: NURSE PRACTITIONER

## 2023-03-21 PROCEDURE — 1036F TOBACCO NON-USER: CPT | Performed by: NURSE PRACTITIONER

## 2023-03-21 PROCEDURE — 1123F ACP DISCUSS/DSCN MKR DOCD: CPT | Performed by: NURSE PRACTITIONER

## 2023-03-21 PROCEDURE — 1111F DSCHRG MED/CURRENT MED MERGE: CPT | Performed by: NURSE PRACTITIONER

## 2023-03-21 PROCEDURE — G8427 DOCREV CUR MEDS BY ELIG CLIN: HCPCS | Performed by: NURSE PRACTITIONER

## 2023-03-21 ASSESSMENT — ENCOUNTER SYMPTOMS: RESPIRATORY NEGATIVE: 1

## 2023-03-21 NOTE — PROGRESS NOTES
[address]   Cardiology Note              Date:  March 21, 2023  Patientname: Bryant Santos  YOB: 1945    Primary Care physician: No primary care provider on file. HISTORY OF PRESENT ILLNESS: Bryant Santos is a 69 y.o. male with a history of CAD, ischemic cardiomyopathy, CHF, HTN, HLD, CKD, DM. He had CABG early 1990s at [facility], stopped following with cardiology after his cardiologist retired. He was admitted 2/8/2023 for SOB and AMS. Found to have CHF and A/CKD. Echo showed EF 25-30%. [facility] 2/17/2023 showed severe native CAD, patent LIMA-LAD, occluded SVG-RCA, SVG-OM1 lesion, staged PCI due to renal function. On 2/20/2023 he had YUN SVG-OM. Today he presents for hospital follow up for CAD s/p PCI and CHF. He is feeling better, shortness of breath and edema significantly improved, weight stable. He has no chest pain, dizziness, syncope. He does have left hip pain that is limited his walking currently, using a walker. He sees PCP and nephrologist at [facility]. Office weight today 3/21/2023: 281 lbs (270 lbs at home)  Hospital discharge weight 2/22/2023: 283 lbs    Cardiologist: saw Dr. Alejandra Moe, Dr. Asif Thomas, Dr. Stephen Cohen while hospitalized    Past Medical History:   has no past medical history on file. Past Surgical History:   has no past surgical history on file. Home Medications:    Prior to Admission medications    Medication Sig Start Date End Date Taking?  Authorizing Provider   furosemide (LASIX) 40 MG tablet Take 1 tablet by mouth daily Each morning 3/13/23   Adonis Corona MD   aspirin 81 MG EC tablet Take 1 tablet by mouth daily 2/21/23   ZION Martinez CNP   carvedilol (COREG) 3.125 MG tablet Take 1 tablet by mouth 2 times daily (with meals) 2/21/23   ZION Martinez CNP   clopidogrel (PLAVIX) 75 MG tablet Take 1 tablet by mouth daily 2/21/23   ZION Martinez CNP   insulin detemir (LEVEMIR FLEXTOUCH) 100 UNIT/ML injection pen Inject 20 Units

## 2023-03-21 NOTE — PATIENT INSTRUCTIONS
Will obtain blood work from [facility] and if kidney function stable, would like you to start entresto 1/2 tablet 24-26 mg twice a day  Blood work 1 week after starting  Discuss farxiga with your kidney doctor  Cardiac rehab referral  Continue other medications  Follow up in 3-4 weeks with me and 2 months with Dr. Jovanny Carballo

## 2023-03-22 ENCOUNTER — TELEPHONE (OUTPATIENT)
Dept: CARDIOLOGY | Age: 78
End: 2023-03-22

## 2023-03-22 NOTE — TELEPHONE ENCOUNTER
Patient had renal panel done at [facility] within the last few weeks. Please obtain. If results stable, will plan on starting entresto 1/2 tablet 24-26 mg BID.

## 2023-03-22 NOTE — LETTER
March 29, 2023       [address]      Dear Orlando Vaughn: Our office has tried to contact you multiple times. Please give our office a call @[phone]    If you have any questions or concerns, please don't hesitate to call.     Sincerely,        ZION Bolanos - CNP

## 2023-03-24 ENCOUNTER — TELEPHONE (OUTPATIENT)
Dept: CARDIOLOGY CLINIC | Age: 78
End: 2023-03-24

## 2023-03-24 DIAGNOSIS — I25.84 CORONARY ARTERY DISEASE DUE TO CALCIFIED CORONARY LESION: Primary | ICD-10-CM

## 2023-03-24 DIAGNOSIS — I25.10 CORONARY ARTERY DISEASE DUE TO CALCIFIED CORONARY LESION: Primary | ICD-10-CM

## 2023-03-24 NOTE — TELEPHONE ENCOUNTER
Carmen from [facility] cardiac rehab called and stated that she received a referral from SHANTANU Sin for cardiac rehab s/p PCI. [address] stated that she has to have an MD signature for rehab. She needs a new order placed from a doctor. Carmen's phone is [phone].   Please fax new order to [phone]

## 2023-03-28 DIAGNOSIS — I50.23 ACUTE ON CHRONIC SYSTOLIC HEART FAILURE (HCC): Primary | ICD-10-CM

## 2023-03-28 DIAGNOSIS — Z79.899 MEDICATION MANAGEMENT: ICD-10-CM

## 2023-03-28 NOTE — TELEPHONE ENCOUNTER
----- Message from Reyes Salts, APRN - CNP sent at 3/28/2023  9:50 AM EDT -----  Please let him know that renal function stable and at baseline. Would like him to start 1/2 tablet entresto 24-26 mg BID for heart function. Will need to repeat BMP in 1 week. He will likely have this done at [facility] so please make sure these results are sent to [facility]. Labs at a [facility] facility is preferable. Thanks!

## 2023-03-28 NOTE — TELEPHONE ENCOUNTER
Called and spoke with patient. Relayed LR NP results and instructions. He VU. Pharmacy verified with patient. Order placed for BMP, patient request [facility] lab called and obtained fax number to send order. See media tab. Notified lab to fax results to Magi Villagran CNP for review.

## 2023-03-30 NOTE — TELEPHONE ENCOUNTER
Pt called and I relayed message and he was already taking 1/2 tab of Entresto and FELICIA on getting BMP and will have it done @ [facility] but will have them fax results.

## 2023-04-04 LAB
BUN BLDV-MCNC: 68 MG/DL
CALCIUM SERPL-MCNC: 9.1 MG/DL
CHLORIDE BLD-SCNC: 105 MMOL/L
CO2: 23 MMOL/L
CREAT SERPL-MCNC: 1.6 MG/DL
EGFR: 42
GLUCOSE BLD-MCNC: 183 MG/DL
POTASSIUM SERPL-SCNC: 5.8 MMOL/L
SODIUM BLD-SCNC: 136 MMOL/L

## 2023-04-05 DIAGNOSIS — Z79.899 MEDICATION MANAGEMENT: ICD-10-CM

## 2023-04-06 ENCOUNTER — TELEPHONE (OUTPATIENT)
Dept: CARDIOLOGY CLINIC | Age: 78
End: 2023-04-06

## 2023-04-06 NOTE — TELEPHONE ENCOUNTER
LM for patient to call office for results   Tried to look at hippa form to see if I could call his spouse. There is no hippa form on file.

## 2023-04-06 NOTE — TELEPHONE ENCOUNTER
----- Message from ZION Islas CNP sent at 4/5/2023 11:18 AM EDT -----  Unfortunately kidney function is worse on entresto and need to stop. Potassium is elevated. Make sure he is not taking potassium supplements. He should follow up with his nephrologist as soon as possible and forward results to their office as well for further recommendations. I would also like a copy of recent office appointment from nephrology if possible. Thank you!

## 2023-04-07 NOTE — TELEPHONE ENCOUNTER
Called and spoke with patient. Relayed LR NP results and instructions. He VU to stop taking Entresto. He states he is eating more leafy greens than usual, instructed him to cut back on consumption, he VU. Patient is being seen by nephrology  at [facility] called at [phone] and spoke with [facility] in medical records, she is to fax last OV from 03/01/2023 for LR NP. His next office visit with nephrology is scheduled 05/03/23.

## 2023-05-31 ENCOUNTER — OFFICE VISIT (OUTPATIENT)
Dept: CARDIOLOGY CLINIC | Age: 78
End: 2023-05-31
Payer: MEDICARE

## 2023-05-31 VITALS
HEART RATE: 72 BPM | BODY MASS INDEX: 34.67 KG/M2 | WEIGHT: 261.6 LBS | DIASTOLIC BLOOD PRESSURE: 70 MMHG | SYSTOLIC BLOOD PRESSURE: 116 MMHG | HEIGHT: 73 IN | OXYGEN SATURATION: 95 %

## 2023-05-31 DIAGNOSIS — E78.2 MIXED HYPERLIPIDEMIA: ICD-10-CM

## 2023-05-31 DIAGNOSIS — I50.22 CHRONIC SYSTOLIC CONGESTIVE HEART FAILURE (HCC): Primary | ICD-10-CM

## 2023-05-31 DIAGNOSIS — I10 PRIMARY HYPERTENSION: ICD-10-CM

## 2023-05-31 DIAGNOSIS — I25.84 CORONARY ARTERY DISEASE DUE TO CALCIFIED CORONARY LESION: ICD-10-CM

## 2023-05-31 DIAGNOSIS — I25.10 CORONARY ARTERY DISEASE DUE TO CALCIFIED CORONARY LESION: ICD-10-CM

## 2023-05-31 DIAGNOSIS — I25.5 ISCHEMIC CARDIOMYOPATHY: ICD-10-CM

## 2023-05-31 DIAGNOSIS — N18.32 STAGE 3B CHRONIC KIDNEY DISEASE (HCC): ICD-10-CM

## 2023-05-31 DIAGNOSIS — Z79.899 MEDICATION MANAGEMENT: ICD-10-CM

## 2023-05-31 PROCEDURE — G8427 DOCREV CUR MEDS BY ELIG CLIN: HCPCS | Performed by: INTERNAL MEDICINE

## 2023-05-31 PROCEDURE — 3078F DIAST BP <80 MM HG: CPT | Performed by: INTERNAL MEDICINE

## 2023-05-31 PROCEDURE — 1036F TOBACCO NON-USER: CPT | Performed by: INTERNAL MEDICINE

## 2023-05-31 PROCEDURE — 99214 OFFICE O/P EST MOD 30 MIN: CPT | Performed by: INTERNAL MEDICINE

## 2023-05-31 PROCEDURE — G8417 CALC BMI ABV UP PARAM F/U: HCPCS | Performed by: INTERNAL MEDICINE

## 2023-05-31 PROCEDURE — 3074F SYST BP LT 130 MM HG: CPT | Performed by: INTERNAL MEDICINE

## 2023-05-31 PROCEDURE — 1123F ACP DISCUSS/DSCN MKR DOCD: CPT | Performed by: INTERNAL MEDICINE

## 2023-05-31 RX ORDER — ASPIRIN 81 MG/1
81 TABLET ORAL DAILY
Qty: 90 TABLET | Refills: 3 | Status: SHIPPED | OUTPATIENT
Start: 2023-05-31

## 2023-05-31 RX ORDER — TRAMADOL HYDROCHLORIDE 50 MG/1
50 TABLET ORAL DAILY
COMMUNITY
Start: 2023-05-10

## 2023-05-31 RX ORDER — CARVEDILOL 3.12 MG/1
3.12 TABLET ORAL 2 TIMES DAILY WITH MEALS
Qty: 180 TABLET | Refills: 3 | Status: SHIPPED | OUTPATIENT
Start: 2023-05-31

## 2023-05-31 RX ORDER — CLOPIDOGREL BISULFATE 75 MG/1
75 TABLET ORAL DAILY
Qty: 90 TABLET | Refills: 3 | Status: SHIPPED | OUTPATIENT
Start: 2023-05-31

## 2023-05-31 NOTE — PATIENT INSTRUCTIONS
Plan:  Labs from 4/11/23 reviewed in epic and discussed with patient. Current medications reviewed. Refills given as warranted. Restart taking entresto 24-26 mg once daily. Limit foods high in potassium like bananas, watermelon, orange juice, potatoes, green leafy vegetables. It is ok to continue taking isosorbide along with your other medications. Repeat BMP in 1 week to check your kidneys and potassium. -you can do this the same day as your echo  -you do not have to be fasting  -if your numbers look good then I will increase entresto to two times a day  -if potassium is high then I will want the name and number of your kidney doctor so I can discuss it with him  I will personally review your tests and then I will have my staff call you with the results.      Follow up with Eugenia Murillo NP in 2 months

## 2023-05-31 NOTE — PROGRESS NOTES
[address] Office Note  5/31/2023     Subjective:  Mr. Fritz Correa follows with my partner Davi Meza NP and is here to establish care for CAD, ischemic cardiomyopathy, chronic systolic CHF, HTN, HLD, CKD 3b, DM. C/o edema    HPI:   He had CABG early 1990s at [facility], stopped following with cardiology after his cardiologist retired. He was admitted 2/8/2023 for SOB and AMS. Found to have CHF and A/CKD. Echo showed EF 25-30%. LHC 2/17/2023 showed severe native CAD, patent LIMA-LAD, occluded SVG-RCA, SVG-OM1 lesion, staged PCI due to renal function. On 2/20/2023 he had YUN SVG-OM. At office appointment 3/21/2023, low dose entresto started for CHF and cardiomyopathy however stopped due to hyperkalemia. Today, his wife Rory Robertson is present. He presents with a cane. He reports he is having back problems and they did a bunch of testing that he will have the results soon. His renal doctor told him to take the entresto and he will monitor the potassium. He reports his legs swell but do not weep. Patient denies current chest pain, sob, palpitations, dizziness or syncope. Patient is taking all cardiac medications as prescribed and tolerates them well. Patient is vaccinated against Covid. Pfizer 5/5       12 point ROS negative in all areas as listed below except as in [address], EENT, pulmonary, GI, , Musculoskeletal, skin, neurological, hematological, endocrine, Psychiatric      Reviewed past medical history, social, and family history. Smoking: quit smoking when he was 41  Alcohol:none  Recreational Drugs:none  Family History: no premature CAD      History reviewed. No pertinent past medical history. History reviewed. No pertinent surgical history.     Objective:   /70   Pulse 72   Ht 6' 1\" (1.854 m)   Wt 261 lb 9.6 oz (118.7 kg)   SpO2 95%   BMI 34.51 kg/m²     Wt Readings from Last 3 Encounters:   05/31/23 261 lb 9.6 oz (118.7 kg)   04/11/23 272 lb 3.2 oz (123.5 kg)   03/21/23 281 lb

## 2023-06-15 ENCOUNTER — TELEPHONE (OUTPATIENT)
Dept: CARDIOLOGY CLINIC | Age: 78
End: 2023-06-15

## 2023-06-18 ENCOUNTER — HOSPITAL ENCOUNTER (INPATIENT)
Age: 78
LOS: 3 days | Discharge: HOME | DRG: 699 | End: 2023-06-21
Attending: INTERNAL MEDICINE | Admitting: INTERNAL MEDICINE
Payer: MEDICARE

## 2023-06-18 PROBLEM — R31.9 HEMATURIA: Status: ACTIVE | Noted: 2023-06-18

## 2023-06-18 LAB
ABO + RH BLD: NORMAL
ABO + RH BLD: NORMAL
BLD GP AB SCN SERPL QL: NORMAL
GLUCOSE BLD-MCNC: 172 MG/DL (ref 70–99)
INR PPP: 1.17 (ref 0.84–1.16)
PERFORMED ON: ABNORMAL
PROTHROMBIN TIME: 14.9 SEC (ref 11.5–14.8)
TROPONIN, HIGH SENSITIVITY: 33 NG/L (ref 0–22)

## 2023-06-18 PROCEDURE — 85610 PROTHROMBIN TIME: CPT

## 2023-06-18 PROCEDURE — 6360000002 HC RX W HCPCS: Performed by: INTERNAL MEDICINE

## 2023-06-18 PROCEDURE — 86900 BLOOD TYPING SEROLOGIC ABO: CPT

## 2023-06-18 PROCEDURE — 36415 COLL VENOUS BLD VENIPUNCTURE: CPT

## 2023-06-18 PROCEDURE — 51700 IRRIGATION OF BLADDER: CPT

## 2023-06-18 PROCEDURE — 86850 RBC ANTIBODY SCREEN: CPT

## 2023-06-18 PROCEDURE — 2580000003 HC RX 258: Performed by: INTERNAL MEDICINE

## 2023-06-18 PROCEDURE — 6370000000 HC RX 637 (ALT 250 FOR IP): Performed by: INTERNAL MEDICINE

## 2023-06-18 PROCEDURE — 86901 BLOOD TYPING SEROLOGIC RH(D): CPT

## 2023-06-18 PROCEDURE — 84484 ASSAY OF TROPONIN QUANT: CPT

## 2023-06-18 PROCEDURE — 2060000000 HC ICU INTERMEDIATE R&B

## 2023-06-18 PROCEDURE — 93005 ELECTROCARDIOGRAM TRACING: CPT | Performed by: INTERNAL MEDICINE

## 2023-06-18 RX ORDER — ASPIRIN 81 MG/1
81 TABLET ORAL DAILY
Status: DISCONTINUED | OUTPATIENT
Start: 2023-06-18 | End: 2023-06-21 | Stop reason: HOSPADM

## 2023-06-18 RX ORDER — FUROSEMIDE 40 MG/1
40 TABLET ORAL DAILY
Status: DISCONTINUED | OUTPATIENT
Start: 2023-06-18 | End: 2023-06-21 | Stop reason: HOSPADM

## 2023-06-18 RX ORDER — ACETAMINOPHEN 325 MG/1
650 TABLET ORAL EVERY 6 HOURS PRN
Status: DISCONTINUED | OUTPATIENT
Start: 2023-06-18 | End: 2023-06-21 | Stop reason: HOSPADM

## 2023-06-18 RX ORDER — POLYETHYLENE GLYCOL 3350 17 G/17G
17 POWDER, FOR SOLUTION ORAL DAILY PRN
Status: DISCONTINUED | OUTPATIENT
Start: 2023-06-18 | End: 2023-06-21 | Stop reason: HOSPADM

## 2023-06-18 RX ORDER — DEXTROSE MONOHYDRATE 100 MG/ML
INJECTION, SOLUTION INTRAVENOUS CONTINUOUS PRN
Status: DISCONTINUED | OUTPATIENT
Start: 2023-06-18 | End: 2023-06-21 | Stop reason: HOSPADM

## 2023-06-18 RX ORDER — INSULIN LISPRO 100 [IU]/ML
0-4 INJECTION, SOLUTION INTRAVENOUS; SUBCUTANEOUS
Status: DISCONTINUED | OUTPATIENT
Start: 2023-06-19 | End: 2023-06-21

## 2023-06-18 RX ORDER — SODIUM CHLORIDE 0.9 % (FLUSH) 0.9 %
5-40 SYRINGE (ML) INJECTION PRN
Status: DISCONTINUED | OUTPATIENT
Start: 2023-06-18 | End: 2023-06-21 | Stop reason: HOSPADM

## 2023-06-18 RX ORDER — SODIUM CHLORIDE 0.9 % (FLUSH) 0.9 %
5-40 SYRINGE (ML) INJECTION EVERY 12 HOURS SCHEDULED
Status: DISCONTINUED | OUTPATIENT
Start: 2023-06-18 | End: 2023-06-21 | Stop reason: HOSPADM

## 2023-06-18 RX ORDER — ATORVASTATIN CALCIUM 40 MG/1
40 TABLET, FILM COATED ORAL DAILY
Status: DISCONTINUED | OUTPATIENT
Start: 2023-06-18 | End: 2023-06-21 | Stop reason: HOSPADM

## 2023-06-18 RX ORDER — TAMSULOSIN HYDROCHLORIDE 0.4 MG/1
0.4 CAPSULE ORAL NIGHTLY
Status: DISCONTINUED | OUTPATIENT
Start: 2023-06-18 | End: 2023-06-21 | Stop reason: HOSPADM

## 2023-06-18 RX ORDER — CLOPIDOGREL BISULFATE 75 MG/1
75 TABLET ORAL DAILY
Status: DISCONTINUED | OUTPATIENT
Start: 2023-06-18 | End: 2023-06-21 | Stop reason: HOSPADM

## 2023-06-18 RX ORDER — CARVEDILOL 3.12 MG/1
3.12 TABLET ORAL 2 TIMES DAILY WITH MEALS
Status: DISCONTINUED | OUTPATIENT
Start: 2023-06-18 | End: 2023-06-21

## 2023-06-18 RX ORDER — ISOSORBIDE MONONITRATE 30 MG/1
30 TABLET, EXTENDED RELEASE ORAL DAILY
Status: DISCONTINUED | OUTPATIENT
Start: 2023-06-18 | End: 2023-06-21 | Stop reason: HOSPADM

## 2023-06-18 RX ORDER — ONDANSETRON 4 MG/1
4 TABLET, ORALLY DISINTEGRATING ORAL EVERY 8 HOURS PRN
Status: DISCONTINUED | OUTPATIENT
Start: 2023-06-18 | End: 2023-06-21 | Stop reason: HOSPADM

## 2023-06-18 RX ORDER — SODIUM CHLORIDE 9 MG/ML
INJECTION, SOLUTION INTRAVENOUS PRN
Status: DISCONTINUED | OUTPATIENT
Start: 2023-06-18 | End: 2023-06-21 | Stop reason: HOSPADM

## 2023-06-18 RX ORDER — ACETAMINOPHEN 650 MG/1
650 SUPPOSITORY RECTAL EVERY 6 HOURS PRN
Status: DISCONTINUED | OUTPATIENT
Start: 2023-06-18 | End: 2023-06-21 | Stop reason: HOSPADM

## 2023-06-18 RX ORDER — INSULIN GLARGINE 100 [IU]/ML
8 INJECTION, SOLUTION SUBCUTANEOUS NIGHTLY
Status: DISCONTINUED | OUTPATIENT
Start: 2023-06-18 | End: 2023-06-21 | Stop reason: HOSPADM

## 2023-06-18 RX ORDER — INSULIN LISPRO 100 [IU]/ML
0-4 INJECTION, SOLUTION INTRAVENOUS; SUBCUTANEOUS NIGHTLY
Status: DISCONTINUED | OUTPATIENT
Start: 2023-06-18 | End: 2023-06-21

## 2023-06-18 RX ORDER — ONDANSETRON 2 MG/ML
4 INJECTION INTRAMUSCULAR; INTRAVENOUS EVERY 6 HOURS PRN
Status: DISCONTINUED | OUTPATIENT
Start: 2023-06-18 | End: 2023-06-21 | Stop reason: HOSPADM

## 2023-06-18 RX ADMIN — FUROSEMIDE 40 MG: 40 TABLET ORAL at 22:03

## 2023-06-18 RX ADMIN — SODIUM CHLORIDE, PRESERVATIVE FREE 10 ML: 5 INJECTION INTRAVENOUS at 22:06

## 2023-06-18 RX ADMIN — INSULIN GLARGINE 8 UNITS: 100 INJECTION, SOLUTION SUBCUTANEOUS at 22:05

## 2023-06-18 RX ADMIN — TAMSULOSIN HYDROCHLORIDE 0.4 MG: 0.4 CAPSULE ORAL at 22:03

## 2023-06-18 RX ADMIN — SODIUM CHLORIDE: 9 INJECTION, SOLUTION INTRAVENOUS at 22:13

## 2023-06-18 RX ADMIN — ISOSORBIDE MONONITRATE 30 MG: 30 TABLET, EXTENDED RELEASE ORAL at 22:03

## 2023-06-18 RX ADMIN — CARVEDILOL 3.12 MG: 3.12 TABLET, FILM COATED ORAL at 22:02

## 2023-06-18 RX ADMIN — ATORVASTATIN CALCIUM 40 MG: 40 TABLET, FILM COATED ORAL at 22:02

## 2023-06-18 RX ADMIN — CEFTRIAXONE SODIUM 1000 MG: 1 INJECTION, POWDER, FOR SOLUTION INTRAMUSCULAR; INTRAVENOUS at 22:14

## 2023-06-19 LAB
ANION GAP SERPL CALCULATED.3IONS-SCNC: 8 MMOL/L (ref 3–16)
BASOPHILS # BLD: 0.1 K/UL (ref 0–0.2)
BASOPHILS NFR BLD: 0.9 %
BUN SERPL-MCNC: 37 MG/DL (ref 7–20)
CALCIUM SERPL-MCNC: 8.5 MG/DL (ref 8.3–10.6)
CHLORIDE SERPL-SCNC: 105 MMOL/L (ref 99–110)
CO2 SERPL-SCNC: 23 MMOL/L (ref 21–32)
CREAT SERPL-MCNC: 1.4 MG/DL (ref 0.8–1.3)
DEPRECATED RDW RBC AUTO: 14.6 % (ref 12.4–15.4)
EKG ATRIAL RATE: 96 BPM
EKG ATRIAL RATE: 96 BPM
EKG DIAGNOSIS: NORMAL
EKG DIAGNOSIS: NORMAL
EKG P-R INTERVAL: 200 MS
EKG Q-T INTERVAL: 406 MS
EKG Q-T INTERVAL: 408 MS
EKG QRS DURATION: 164 MS
EKG QRS DURATION: 168 MS
EKG QTC CALCULATION (BAZETT): 515 MS
EKG QTC CALCULATION (BAZETT): 521 MS
EKG R AXIS: -48 DEGREES
EKG R AXIS: -49 DEGREES
EKG T AXIS: 111 DEGREES
EKG T AXIS: 85 DEGREES
EKG VENTRICULAR RATE: 96 BPM
EKG VENTRICULAR RATE: 99 BPM
EOSINOPHIL # BLD: 0.1 K/UL (ref 0–0.6)
EOSINOPHIL NFR BLD: 0.8 %
EST. AVERAGE GLUCOSE BLD GHB EST-MCNC: 148.5 MG/DL
GFR SERPLBLD CREATININE-BSD FMLA CKD-EPI: 52 ML/MIN/{1.73_M2}
GLUCOSE BLD-MCNC: 146 MG/DL (ref 70–99)
GLUCOSE BLD-MCNC: 169 MG/DL (ref 70–99)
GLUCOSE BLD-MCNC: 175 MG/DL (ref 70–99)
GLUCOSE BLD-MCNC: 315 MG/DL (ref 70–99)
GLUCOSE SERPL-MCNC: 180 MG/DL (ref 70–99)
HBA1C MFR BLD: 6.8 %
HCT VFR BLD AUTO: 36.9 % (ref 40.5–52.5)
HCT VFR BLD AUTO: 36.9 % (ref 40.5–52.5)
HCT VFR BLD AUTO: 37.5 % (ref 40.5–52.5)
HCT VFR BLD AUTO: 37.8 % (ref 40.5–52.5)
HCT VFR BLD AUTO: 39.7 % (ref 40.5–52.5)
HGB BLD-MCNC: 12.4 G/DL (ref 13.5–17.5)
HGB BLD-MCNC: 12.5 G/DL (ref 13.5–17.5)
HGB BLD-MCNC: 13.1 G/DL (ref 13.5–17.5)
LYMPHOCYTES # BLD: 1 K/UL (ref 1–5.1)
LYMPHOCYTES NFR BLD: 10.5 %
MCH RBC QN AUTO: 30.3 PG (ref 26–34)
MCHC RBC AUTO-ENTMCNC: 33.8 G/DL (ref 31–36)
MCV RBC AUTO: 89.7 FL (ref 80–100)
MONOCYTES # BLD: 0.9 K/UL (ref 0–1.3)
MONOCYTES NFR BLD: 9.8 %
NEUTROPHILS # BLD: 7.3 K/UL (ref 1.7–7.7)
NEUTROPHILS NFR BLD: 78 %
PERFORMED ON: ABNORMAL
PLATELET # BLD AUTO: 138 K/UL (ref 135–450)
PMV BLD AUTO: 10.2 FL (ref 5–10.5)
POTASSIUM SERPL-SCNC: 4.4 MMOL/L (ref 3.5–5.1)
RBC # BLD AUTO: 4.11 M/UL (ref 4.2–5.9)
SODIUM SERPL-SCNC: 136 MMOL/L (ref 136–145)
WBC # BLD AUTO: 9.3 K/UL (ref 4–11)

## 2023-06-19 PROCEDURE — 6360000002 HC RX W HCPCS: Performed by: INTERNAL MEDICINE

## 2023-06-19 PROCEDURE — 6370000000 HC RX 637 (ALT 250 FOR IP): Performed by: INTERNAL MEDICINE

## 2023-06-19 PROCEDURE — 1200000000 HC SEMI PRIVATE

## 2023-06-19 PROCEDURE — 85025 COMPLETE CBC W/AUTO DIFF WBC: CPT

## 2023-06-19 PROCEDURE — 99222 1ST HOSP IP/OBS MODERATE 55: CPT | Performed by: INTERNAL MEDICINE

## 2023-06-19 PROCEDURE — 36415 COLL VENOUS BLD VENIPUNCTURE: CPT

## 2023-06-19 PROCEDURE — 80048 BASIC METABOLIC PNL TOTAL CA: CPT

## 2023-06-19 PROCEDURE — 2580000003 HC RX 258: Performed by: INTERNAL MEDICINE

## 2023-06-19 PROCEDURE — 85018 HEMOGLOBIN: CPT

## 2023-06-19 PROCEDURE — 83036 HEMOGLOBIN GLYCOSYLATED A1C: CPT

## 2023-06-19 PROCEDURE — 85014 HEMATOCRIT: CPT

## 2023-06-19 RX ADMIN — ATORVASTATIN CALCIUM 40 MG: 40 TABLET, FILM COATED ORAL at 08:33

## 2023-06-19 RX ADMIN — SODIUM CHLORIDE, PRESERVATIVE FREE 10 ML: 5 INJECTION INTRAVENOUS at 08:34

## 2023-06-19 RX ADMIN — ASPIRIN 81 MG: 81 TABLET, COATED ORAL at 08:33

## 2023-06-19 RX ADMIN — SODIUM CHLORIDE, PRESERVATIVE FREE 10 ML: 5 INJECTION INTRAVENOUS at 20:51

## 2023-06-19 RX ADMIN — CARVEDILOL 3.12 MG: 3.12 TABLET, FILM COATED ORAL at 18:44

## 2023-06-19 RX ADMIN — INSULIN GLARGINE 8 UNITS: 100 INJECTION, SOLUTION SUBCUTANEOUS at 20:52

## 2023-06-19 RX ADMIN — ISOSORBIDE MONONITRATE 30 MG: 30 TABLET, EXTENDED RELEASE ORAL at 08:34

## 2023-06-19 RX ADMIN — TAMSULOSIN HYDROCHLORIDE 0.4 MG: 0.4 CAPSULE ORAL at 20:51

## 2023-06-19 RX ADMIN — CARVEDILOL 3.12 MG: 3.12 TABLET, FILM COATED ORAL at 08:33

## 2023-06-19 RX ADMIN — FUROSEMIDE 40 MG: 40 TABLET ORAL at 08:33

## 2023-06-19 RX ADMIN — INSULIN LISPRO 4 UNITS: 100 INJECTION, SOLUTION INTRAVENOUS; SUBCUTANEOUS at 20:53

## 2023-06-19 RX ADMIN — SACUBITRIL AND VALSARTAN 0.5 TABLET: 24; 26 TABLET, FILM COATED ORAL at 20:51

## 2023-06-19 RX ADMIN — CEFTRIAXONE SODIUM 1000 MG: 1 INJECTION, POWDER, FOR SOLUTION INTRAMUSCULAR; INTRAVENOUS at 22:36

## 2023-06-19 NOTE — CONSULTS
CARDIOLOGY CONSULTATION        Patient Name: Lynn Bruce  Date of admission: 6/18/2023  7:02 PM  Admission Dx: Hematuria [R31.9]  Requesting Physician: May Law MD  Primary Care physician: ZION Cat NP    Reason for Consultation/Chief Complaint: Coronary disease/cardiomyopathy/history of PCI/DAPT    History of Present Illness:     Lynn Bruce is a 69 y.o. patient with prior medical history notable for coronary artery disease with prior CABG/PCI with YUN February, heart failure with reduced EF, cardiomyopathy with severe LV dysfunction with EF 15 to 20% February 2023, diabetes, prostate cancer, chronic kidney disease who presented to the hospital with complaints of hematuria. Cardiology is consulted for further management of dual antiplatelet therapy in the setting of chief complaint. Follows with Dr. Lokesh Finch. Last office visit May 31. Noted at that time he had undergone CABG early 1990s at [facility]. Admitted for shortness of breath and altered mentation in February of this year. Echo showed severe LV dysfunction. Left heart catheterization 2/17/2023 showed severe native CAD, patent LIMA-LAD, occluded SVG-RCA, SVG-OM1 lesion, staged PCI due to renal function. On 2/20/2023 he had YUN SVG-OM. Noted stable at that appointment. Most recent echo showed EF 15-20%. Outpatient medication regimen includes low-dose carvedilol, sacubitril-valsartan, Imdur, for GDMT. Remains on Lasix to maintain euvolemia. He is on dual antiplatelet therapy for history of PCI. Today the patient states he had been doing well in the interim until he noted new onset hematuria. States he was working in his backyard, and when he went to urinate he noticed bright red urine. He had been maintained on dual antiplatelet therapy and has been compliant with that. Notes no recent chest pain or pressure with activity. No limiting dyspnea with exertion.   No shortness of breath at rest.  Denies

## 2023-06-19 NOTE — PLAN OF CARE
Problem: Chronic Conditions and Co-morbidities  Goal: Patient's chronic conditions and co-morbidity symptoms are monitored and maintained or improved  6/19/2023 0936 by Jarrett Hernandez RN  Outcome: Progressing  Flowsheets (Taken 6/19/2023 8065)  Care Plan - Patient's Chronic Conditions and Co-Morbidity Symptoms are Monitored and Maintained or Improved:   Monitor and assess patient's chronic conditions and comorbid symptoms for stability, deterioration, or improvement   Collaborate with multidisciplinary team to address chronic and comorbid conditions and prevent exacerbation or deterioration   Update acute care plan with appropriate goals if chronic or comorbid symptoms are exacerbated and prevent overall improvement and discharge  6/19/2023 0027 by Samm Camp RN  Outcome: Progressing  [address] (Taken 6/19/2023 0027)  Care Plan - Patient's Chronic Conditions and Co-Morbidity Symptoms are Monitored and Maintained or Improved:   Monitor and assess patient's chronic conditions and comorbid symptoms for stability, deterioration, or improvement   Collaborate with multidisciplinary team to address chronic and comorbid conditions and prevent exacerbation or deterioration     Problem: Discharge Planning  Goal: Discharge to home or other facility with appropriate resources  6/19/2023 0936 by Jarrett Hernandez RN  Outcome: Progressing  Flowsheets (Taken 6/19/2023 8408)  Discharge to home or other facility with appropriate resources:   Identify barriers to discharge with patient and caregiver   Arrange for needed discharge resources and transportation as appropriate   Identify discharge learning needs (meds, wound care, etc)  6/19/2023 0027 by Samm Camp RN  Outcome: Progressing  Flowsheets (Taken 6/19/2023 0027)  Discharge to home or other facility with appropriate resources:   Identify barriers to discharge with patient and caregiver   Arrange for needed discharge resources and transportation as appropriate     Problem:

## 2023-06-19 NOTE — CONSULTS
Urology Consult Note      Reason for Consultation: gross hematuria     Chief Complaint: \"blood in my urine\"    HPI:  Farzaneh Nesbitt is a 69 y.o. male with CHF (EF 25-30%), CAD post stent placement 2/20/2023 on ASA and plavix, CKD3, history of prostate cancer treated with radiation about 5 years ago primarily followed by Dr Lico Ch and [facility], former smoker who presented to [facility] ER with blood in his urine. He denies any significant symptoms or prior issues with blood in his urine. CT a/p at OSH ER revealed a distended bladder with a 7.3cm density in the base of the bladder. A 3 way mccoy catheter was placed at OSH and he was started on CBI. Patient tells me that he has had significant clot drainage since mccoy was placed. Today he reports feeling well, no issues with mccoy       No past medical history on file. No past surgical history on file.     Medication List reviewed:      Current Facility-Administered Medications   Medication Dose Route Frequency Provider Last Rate Last Admin    aspirin EC tablet 81 mg  81 mg Oral Daily Chryl Gitelman, MD   81 mg at 06/19/23 0833    atorvastatin (LIPITOR) tablet 40 mg  40 mg Oral Daily Chryl Gitelman, MD   40 mg at 06/19/23 0833    carvedilol (COREG) tablet 3.125 mg  3.125 mg Oral BID WC Chryl Gitelman, MD   3.125 mg at 06/19/23 0207    [Held by provider] clopidogrel (PLAVIX) tablet 75 mg  75 mg Oral Daily Chryl Gitelman, MD        furosemide (LASIX) tablet 40 mg  40 mg Oral Daily Chryl Gitelman, MD   40 mg at 06/19/23 0833    isosorbide mononitrate (IMDUR) extended release tablet 30 mg  30 mg Oral Daily Chryl Gitelman, MD   30 mg at 06/19/23 0834    [Held by provider] sacubitril-valsartan (ENTRESTO) 24-26 MG per tablet 1 tablet  1 tablet Oral BID Chryl Gitelman, MD        tamsulosin (FLOMAX) capsule 0.4 mg  0.4 mg Oral Nightly Chryl Gitelman, MD   0.4 mg at 06/18/23 2203    sodium chloride flush 0.9 % injection 5-40 mL  5-40

## 2023-06-19 NOTE — PLAN OF CARE
Problem: Chronic Conditions and Co-morbidities  Goal: Patient's chronic conditions and co-morbidity symptoms are monitored and maintained or improved  Outcome: Progressing  Flowsheets (Taken 6/19/2023 0027)  Care Plan - Patient's Chronic Conditions and Co-Morbidity Symptoms are Monitored and Maintained or Improved:   Monitor and assess patient's chronic conditions and comorbid symptoms for stability, deterioration, or improvement   Collaborate with multidisciplinary team to address chronic and comorbid conditions and prevent exacerbation or deterioration     Problem: Discharge Planning  Goal: Discharge to home or other facility with appropriate resources  Outcome: Progressing  Flowsheets (Taken 6/19/2023 0027)  Discharge to home or other facility with appropriate resources:   Identify barriers to discharge with patient and caregiver   Arrange for needed discharge resources and transportation as appropriate     Problem: Safety - Adult  Goal: Free from fall injury  Outcome: Progressing  Flowsheets (Taken 6/19/2023 0027)  Free From Fall Injury: Instruct family/caregiver on patient safety     Problem: ABCDS Injury Assessment  Goal: Absence of physical injury  Outcome: Progressing  Flowsheets (Taken 6/19/2023 0027)  Absence of Physical Injury: Implement safety measures based on patient assessment     Problem: Genitourinary - Adult  Goal: Urinary catheter remains patent  Outcome: Progressing  Flowsheets (Taken 6/19/2023 0027)  Urinary catheter remains patent:   Assess patency of urinary catheter   Irrigate catheter per Licensed Independent Practitioner order if indicated and notify Licensed Independent Practitioner if unable to irrigate

## 2023-06-19 NOTE — CARE COORDINATION
Case Management Assessment  Initial Evaluation    Date/Time of Evaluation: 6/19/2023 10:57 AM  Assessment Completed by: Ally Coe RN    If patient is discharged prior to next notation, then this note serves as note for discharge by case management. Patient Name: Jasmin Toussaint                   YOB: 1945  Diagnosis: Hematuria [R31.9]                   Date / Time: 6/18/2023  7:02 PM    Patient Admission Status: Inpatient   Readmission Risk (Low < 19, Mod (19-27), High > 27): Readmission Risk Score: 13.8    Current PCP: ZOIN Quintero NP  PCP verified by CM? (P) Yes    Chart Reviewed: Yes      History Provided by: (P) Patient  Patient Orientation: (P) Alert and Oriented, Person, Place, Situation    Patient Cognition: (P) Alert    Hospitalization in the last 30 days (Readmission):  No    If yes, Readmission Assessment in  Navigator will be completed.     Advance Directives:      Code Status: Full Code   Patient's Primary Decision Maker is: (P) Legal Next of Kin      Discharge Planning:    Patient lives with: (P) Spouse/Significant Other Type of Home: (P) House  Primary Care Giver: (P) Self  Patient Support Systems include: (P) Spouse/Significant Other, Children   Current Financial resources: (P) Medicare  Current community resources: (P) None  Current services prior to admission: (P) None, Durable Medical Equipment (walker)            Current DME: (P) Walker            Type of Home Care services:  (P) None    ADLS  Prior functional level: (P) Independent in ADLs/IADLs  Current functional level: (P) Independent in ADLs/IADLs    PT AM-PAC:   /24  OT AM-PAC:   /24    Family can provide assistance at DC: (P) Yes  Would you like Case Management to discuss the discharge plan with any other family members/significant others, and if so, who? (P) No  Plans to Return to Present Housing: (P) Yes  Other Identified Issues/Barriers to RETURNING to current housing: none  Potential Assistance

## 2023-06-19 NOTE — PLAN OF CARE
Problem: Genitourinary - Adult  Goal: Urinary catheter remains patent  6/19/2023 1948 by Mya Curiel RN  Outcome: Progressing  Flowsheets (Taken 6/19/2023 1948)  Urinary catheter remains patent: Assess patency of urinary catheter

## 2023-06-19 NOTE — ACP (ADVANCE CARE PLANNING)
Advance Care Planning     General Advance Care Planning (ACP) Conversation    Date of Conversation: 6/18/2023  Conducted with: Patient with Decision Making Capacity   Healthcare Decision Maker: Next of Kin by law (only applies in absence of above) (name) [address]:  No healthcare decision makers have been documented. Click here to complete [address] including selection of the Healthcare Decision Maker Relationship (ie \"Primary\")   Today we documented Decision Maker(s) consistent with Legal Next of Kin hierarchy.     Content/Action Overview:  DECLINED ACP Conversation - will revisit periodically  Reviewed DNR/DNI and patient elects Full Code (Attempt Resuscitation)        Length of Voluntary ACP Conversation in minutes:  <16 minutes (Non-Billable)    Adela Ramirez RN

## 2023-06-20 ENCOUNTER — ANESTHESIA EVENT (OUTPATIENT)
Dept: OPERATING ROOM | Age: 78
DRG: 699 | End: 2023-06-20
Payer: MEDICARE

## 2023-06-20 ENCOUNTER — ANESTHESIA (OUTPATIENT)
Dept: OPERATING ROOM | Age: 78
DRG: 699 | End: 2023-06-20
Payer: MEDICARE

## 2023-06-20 LAB
GLUCOSE BLD-MCNC: 160 MG/DL (ref 70–99)
GLUCOSE BLD-MCNC: 177 MG/DL (ref 70–99)
GLUCOSE BLD-MCNC: 178 MG/DL (ref 70–99)
GLUCOSE BLD-MCNC: 194 MG/DL (ref 70–99)
GLUCOSE BLD-MCNC: 200 MG/DL (ref 70–99)
HCT VFR BLD AUTO: 37.6 % (ref 40.5–52.5)
HCT VFR BLD AUTO: 38.5 % (ref 40.5–52.5)
HCT VFR BLD AUTO: 39.9 % (ref 40.5–52.5)
HCT VFR BLD AUTO: 41.6 % (ref 40.5–52.5)
HGB BLD-MCNC: 12.3 G/DL (ref 13.5–17.5)
HGB BLD-MCNC: 12.8 G/DL (ref 13.5–17.5)
HGB BLD-MCNC: 13.2 G/DL (ref 13.5–17.5)
HGB BLD-MCNC: 13.8 G/DL (ref 13.5–17.5)
PERFORMED ON: ABNORMAL

## 2023-06-20 PROCEDURE — 3600000014 HC SURGERY LEVEL 4 ADDTL 15MIN: Performed by: UROLOGY

## 2023-06-20 PROCEDURE — 3700000000 HC ANESTHESIA ATTENDED CARE: Performed by: UROLOGY

## 2023-06-20 PROCEDURE — 2580000003 HC RX 258: Performed by: INTERNAL MEDICINE

## 2023-06-20 PROCEDURE — 2580000003 HC RX 258: Performed by: UROLOGY

## 2023-06-20 PROCEDURE — 7100000000 HC PACU RECOVERY - FIRST 15 MIN: Performed by: UROLOGY

## 2023-06-20 PROCEDURE — 6360000002 HC RX W HCPCS: Performed by: ANESTHESIOLOGY

## 2023-06-20 PROCEDURE — 85014 HEMATOCRIT: CPT

## 2023-06-20 PROCEDURE — 0TCB8ZZ EXTIRPATION OF MATTER FROM BLADDER, VIA NATURAL OR ARTIFICIAL OPENING ENDOSCOPIC: ICD-10-PCS | Performed by: UROLOGY

## 2023-06-20 PROCEDURE — 1200000000 HC SEMI PRIVATE

## 2023-06-20 PROCEDURE — 3700000001 HC ADD 15 MINUTES (ANESTHESIA): Performed by: UROLOGY

## 2023-06-20 PROCEDURE — 99232 SBSQ HOSP IP/OBS MODERATE 35: CPT | Performed by: INTERNAL MEDICINE

## 2023-06-20 PROCEDURE — 6370000000 HC RX 637 (ALT 250 FOR IP): Performed by: UROLOGY

## 2023-06-20 PROCEDURE — 6360000002 HC RX W HCPCS: Performed by: NURSE ANESTHETIST, CERTIFIED REGISTERED

## 2023-06-20 PROCEDURE — 97530 THERAPEUTIC ACTIVITIES: CPT

## 2023-06-20 PROCEDURE — 2500000003 HC RX 250 WO HCPCS: Performed by: NURSE ANESTHETIST, CERTIFIED REGISTERED

## 2023-06-20 PROCEDURE — 2709999900 HC NON-CHARGEABLE SUPPLY: Performed by: UROLOGY

## 2023-06-20 PROCEDURE — 3600000004 HC SURGERY LEVEL 4 BASE: Performed by: UROLOGY

## 2023-06-20 PROCEDURE — 97116 GAIT TRAINING THERAPY: CPT

## 2023-06-20 PROCEDURE — 51700 IRRIGATION OF BLADDER: CPT

## 2023-06-20 PROCEDURE — 7100000001 HC PACU RECOVERY - ADDTL 15 MIN: Performed by: UROLOGY

## 2023-06-20 PROCEDURE — 6370000000 HC RX 637 (ALT 250 FOR IP): Performed by: INTERNAL MEDICINE

## 2023-06-20 PROCEDURE — 97535 SELF CARE MNGMENT TRAINING: CPT

## 2023-06-20 PROCEDURE — 2580000003 HC RX 258: Performed by: NURSE ANESTHETIST, CERTIFIED REGISTERED

## 2023-06-20 PROCEDURE — 6360000002 HC RX W HCPCS: Performed by: UROLOGY

## 2023-06-20 PROCEDURE — 97161 PT EVAL LOW COMPLEX 20 MIN: CPT

## 2023-06-20 PROCEDURE — 97165 OT EVAL LOW COMPLEX 30 MIN: CPT

## 2023-06-20 PROCEDURE — 36415 COLL VENOUS BLD VENIPUNCTURE: CPT

## 2023-06-20 PROCEDURE — 85018 HEMOGLOBIN: CPT

## 2023-06-20 RX ORDER — SODIUM CHLORIDE 9 MG/ML
INJECTION, SOLUTION INTRAVENOUS PRN
Status: DISCONTINUED | OUTPATIENT
Start: 2023-06-20 | End: 2023-06-20 | Stop reason: HOSPADM

## 2023-06-20 RX ORDER — SODIUM CHLORIDE 0.9 % (FLUSH) 0.9 %
5-40 SYRINGE (ML) INJECTION PRN
Status: DISCONTINUED | OUTPATIENT
Start: 2023-06-20 | End: 2023-06-20 | Stop reason: HOSPADM

## 2023-06-20 RX ORDER — SODIUM CHLORIDE, SODIUM LACTATE, POTASSIUM CHLORIDE, CALCIUM CHLORIDE 600; 310; 30; 20 MG/100ML; MG/100ML; MG/100ML; MG/100ML
INJECTION, SOLUTION INTRAVENOUS CONTINUOUS PRN
Status: DISCONTINUED | OUTPATIENT
Start: 2023-06-20 | End: 2023-06-20 | Stop reason: SDUPTHER

## 2023-06-20 RX ORDER — PHENYLEPHRINE HCL IN 0.9% NACL 1 MG/10 ML
SYRINGE (ML) INTRAVENOUS PRN
Status: DISCONTINUED | OUTPATIENT
Start: 2023-06-20 | End: 2023-06-20 | Stop reason: SDUPTHER

## 2023-06-20 RX ORDER — ONDANSETRON 2 MG/ML
INJECTION INTRAMUSCULAR; INTRAVENOUS PRN
Status: DISCONTINUED | OUTPATIENT
Start: 2023-06-20 | End: 2023-06-20 | Stop reason: SDUPTHER

## 2023-06-20 RX ORDER — ROCURONIUM BROMIDE 10 MG/ML
INJECTION, SOLUTION INTRAVENOUS PRN
Status: DISCONTINUED | OUTPATIENT
Start: 2023-06-20 | End: 2023-06-20 | Stop reason: SDUPTHER

## 2023-06-20 RX ORDER — MAGNESIUM HYDROXIDE 1200 MG/15ML
LIQUID ORAL PRN
Status: DISCONTINUED | OUTPATIENT
Start: 2023-06-20 | End: 2023-06-20 | Stop reason: ALTCHOICE

## 2023-06-20 RX ORDER — LABETALOL HYDROCHLORIDE 5 MG/ML
5 INJECTION, SOLUTION INTRAVENOUS EVERY 10 MIN PRN
Status: DISCONTINUED | OUTPATIENT
Start: 2023-06-20 | End: 2023-06-20 | Stop reason: HOSPADM

## 2023-06-20 RX ORDER — SUCCINYLCHOLINE CHLORIDE 20 MG/ML
INJECTION INTRAMUSCULAR; INTRAVENOUS PRN
Status: DISCONTINUED | OUTPATIENT
Start: 2023-06-20 | End: 2023-06-20 | Stop reason: SDUPTHER

## 2023-06-20 RX ORDER — PROPOFOL 10 MG/ML
INJECTION, EMULSION INTRAVENOUS PRN
Status: DISCONTINUED | OUTPATIENT
Start: 2023-06-20 | End: 2023-06-20 | Stop reason: SDUPTHER

## 2023-06-20 RX ORDER — MEPERIDINE HYDROCHLORIDE 50 MG/ML
12.5 INJECTION INTRAMUSCULAR; INTRAVENOUS; SUBCUTANEOUS EVERY 5 MIN PRN
Status: DISCONTINUED | OUTPATIENT
Start: 2023-06-20 | End: 2023-06-20 | Stop reason: HOSPADM

## 2023-06-20 RX ORDER — FENTANYL CITRATE 50 UG/ML
INJECTION, SOLUTION INTRAMUSCULAR; INTRAVENOUS PRN
Status: DISCONTINUED | OUTPATIENT
Start: 2023-06-20 | End: 2023-06-20 | Stop reason: SDUPTHER

## 2023-06-20 RX ORDER — SODIUM CHLORIDE 0.9 % (FLUSH) 0.9 %
5-40 SYRINGE (ML) INJECTION EVERY 12 HOURS SCHEDULED
Status: DISCONTINUED | OUTPATIENT
Start: 2023-06-20 | End: 2023-06-20 | Stop reason: HOSPADM

## 2023-06-20 RX ORDER — OXYCODONE HYDROCHLORIDE 5 MG/1
10 TABLET ORAL PRN
Status: DISCONTINUED | OUTPATIENT
Start: 2023-06-20 | End: 2023-06-20 | Stop reason: HOSPADM

## 2023-06-20 RX ORDER — DIPHENHYDRAMINE HYDROCHLORIDE 50 MG/ML
12.5 INJECTION INTRAMUSCULAR; INTRAVENOUS
Status: DISCONTINUED | OUTPATIENT
Start: 2023-06-20 | End: 2023-06-20 | Stop reason: HOSPADM

## 2023-06-20 RX ORDER — LIDOCAINE HYDROCHLORIDE 20 MG/ML
INJECTION, SOLUTION EPIDURAL; INFILTRATION; INTRACAUDAL; PERINEURAL PRN
Status: DISCONTINUED | OUTPATIENT
Start: 2023-06-20 | End: 2023-06-20 | Stop reason: SDUPTHER

## 2023-06-20 RX ORDER — ONDANSETRON 2 MG/ML
4 INJECTION INTRAMUSCULAR; INTRAVENOUS
Status: DISCONTINUED | OUTPATIENT
Start: 2023-06-20 | End: 2023-06-20 | Stop reason: HOSPADM

## 2023-06-20 RX ORDER — OXYCODONE HYDROCHLORIDE 5 MG/1
5 TABLET ORAL PRN
Status: DISCONTINUED | OUTPATIENT
Start: 2023-06-20 | End: 2023-06-20 | Stop reason: HOSPADM

## 2023-06-20 RX ADMIN — CARVEDILOL 3.12 MG: 3.12 TABLET, FILM COATED ORAL at 10:01

## 2023-06-20 RX ADMIN — ROCURONIUM BROMIDE 10 MG: 50 INJECTION, SOLUTION INTRAVENOUS at 18:06

## 2023-06-20 RX ADMIN — SODIUM CHLORIDE, PRESERVATIVE FREE 10 ML: 5 INJECTION INTRAVENOUS at 10:08

## 2023-06-20 RX ADMIN — ONDANSETRON 4 MG: 2 INJECTION INTRAMUSCULAR; INTRAVENOUS at 18:16

## 2023-06-20 RX ADMIN — SUCCINYLCHOLINE CHLORIDE 140 MG: 20 INJECTION, SOLUTION INTRAMUSCULAR; INTRAVENOUS at 18:06

## 2023-06-20 RX ADMIN — SACUBITRIL AND VALSARTAN 0.5 TABLET: 24; 26 TABLET, FILM COATED ORAL at 21:18

## 2023-06-20 RX ADMIN — SODIUM CHLORIDE, SODIUM LACTATE, POTASSIUM CHLORIDE, AND CALCIUM CHLORIDE: .6; .31; .03; .02 INJECTION, SOLUTION INTRAVENOUS at 18:01

## 2023-06-20 RX ADMIN — FUROSEMIDE 40 MG: 40 TABLET ORAL at 10:01

## 2023-06-20 RX ADMIN — Medication 300 MCG: at 18:19

## 2023-06-20 RX ADMIN — SODIUM CHLORIDE, PRESERVATIVE FREE 10 ML: 5 INJECTION INTRAVENOUS at 21:18

## 2023-06-20 RX ADMIN — SACUBITRIL AND VALSARTAN 0.5 TABLET: 24; 26 TABLET, FILM COATED ORAL at 10:01

## 2023-06-20 RX ADMIN — LIDOCAINE HYDROCHLORIDE 60 MG: 20 INJECTION, SOLUTION EPIDURAL; INFILTRATION; INTRACAUDAL; PERINEURAL at 18:06

## 2023-06-20 RX ADMIN — ATORVASTATIN CALCIUM 40 MG: 40 TABLET, FILM COATED ORAL at 10:01

## 2023-06-20 RX ADMIN — CEFTRIAXONE SODIUM 1000 MG: 1 INJECTION, POWDER, FOR SOLUTION INTRAMUSCULAR; INTRAVENOUS at 22:19

## 2023-06-20 RX ADMIN — FENTANYL CITRATE 50 MCG: 50 INJECTION, SOLUTION INTRAMUSCULAR; INTRAVENOUS at 18:06

## 2023-06-20 RX ADMIN — Medication 200 MCG: at 18:22

## 2023-06-20 RX ADMIN — ISOSORBIDE MONONITRATE 30 MG: 30 TABLET, EXTENDED RELEASE ORAL at 10:01

## 2023-06-20 RX ADMIN — PROPOFOL 120 MG: 10 INJECTION, EMULSION INTRAVENOUS at 18:06

## 2023-06-20 RX ADMIN — Medication 200 MCG: at 18:06

## 2023-06-20 RX ADMIN — TAMSULOSIN HYDROCHLORIDE 0.4 MG: 0.4 CAPSULE ORAL at 21:18

## 2023-06-20 RX ADMIN — Medication 100 MCG: at 18:23

## 2023-06-20 RX ADMIN — HYDROMORPHONE HYDROCHLORIDE 0.5 MG: 1 INJECTION, SOLUTION INTRAMUSCULAR; INTRAVENOUS; SUBCUTANEOUS at 18:43

## 2023-06-20 RX ADMIN — INSULIN GLARGINE 8 UNITS: 100 INJECTION, SOLUTION SUBCUTANEOUS at 21:24

## 2023-06-20 ASSESSMENT — PAIN DESCRIPTION - DESCRIPTORS
DESCRIPTORS: BURNING
DESCRIPTORS: BURNING

## 2023-06-20 ASSESSMENT — PAIN DESCRIPTION - ORIENTATION
ORIENTATION: ANTERIOR
ORIENTATION: ANTERIOR

## 2023-06-20 ASSESSMENT — PAIN SCALES - GENERAL
PAINLEVEL_OUTOF10: 6
PAINLEVEL_OUTOF10: 3

## 2023-06-20 ASSESSMENT — PAIN DESCRIPTION - LOCATION
LOCATION: PENIS
LOCATION: PENIS

## 2023-06-20 NOTE — ANESTHESIA PRE PROCEDURE
Department of Anesthesiology  Preprocedure Note       Name:  Malik Cunha   Age:  69 y.o.  :  1945                                          MRN:  [id number]         Date:  2023      Surgeon: Lula Marques):  Judy Huitron MD    Procedure: Procedure(s):  CYSTOSCOPY EVACUATION OF CLOTS POSSIBLE BLADDER BIOPSY    Medications prior to admission:   Prior to Admission medications    Medication Sig Start Date End Date Taking? Authorizing Provider   traMADol (ULTRAM) 50 MG tablet Take 1 tablet by mouth daily.  5/10/23   Historical Provider, MD   carvedilol (COREG) 3.125 MG tablet Take 1 tablet by mouth 2 times daily (with meals) 23   Donna Fry MD   clopidogrel (PLAVIX) 75 MG tablet Take 1 tablet by mouth daily 23   Donna Fry MD   aspirin 81 MG EC tablet Take 1 tablet by mouth daily 23   Donna Fry MD   sacubitril-valsartan (ENTRESTO) 24-26 MG per tablet Take 1 tablet by mouth 2 times daily 23   Donna Fry MD   isosorbide mononitrate (IMDUR) 30 MG extended release tablet Take 1 tablet by mouth daily 23   ZION Spencer CNP   furosemide (LASIX) 40 MG tablet Take 1 tablet by mouth daily Each morning 3/13/23   Julee Valerio MD   insulin detemir (LEVEMIR FLEXTOUCH) 100 UNIT/ML injection pen Inject 30 Units into the skin 2 times daily 23   ZION Pruett CNP   tamsulosin (FLOMAX) 0.4 MG capsule Take 2 capsules by mouth at bedtime 23   ZION Pruett CNP   atorvastatin (LIPITOR) 40 MG tablet Take 1 tablet by mouth daily Take at bedtime    Historical Provider, MD       Current medications:    Current Facility-Administered Medications   Medication Dose Route Frequency Provider Last Rate Last Admin    sacubitril-valsartan (ENTRESTO) 24-26 MG per tablet 0.5 tablet  0.5 tablet Oral BID Felicia Wilson MD   0.5 tablet at 23 1001    aspirin EC tablet 81 mg  81 mg Oral Daily Marcos Moody MD   81 mg at 23 0833    atorvastatin

## 2023-06-20 NOTE — CARE COORDINATION
Chart reviewed day 2. Care managed by urology and IM. Patietn from home with spouse SUMMERLENCHO walker prn will follow for needs.  Mee Rios RN

## 2023-06-20 NOTE — ANESTHESIA POSTPROCEDURE EVALUATION
Department of Anesthesiology  Postprocedure Note    Patient: Waldemar Rolon  MRN: [id number]  YOB: 1945  Date of evaluation: 6/20/2023      Procedure Summary     Date: 06/20/23 Room / Location: [address]    Anesthesia Start: 1801 Anesthesia Stop: 1844    Procedure: CYSTOSCOPY EVACUATION OF CLOTS (Bladder) Diagnosis:       Gross hematuria      (Gross hematuria [R31.0])    Surgeons: Narendra Sultana MD Responsible Provider: Charu Silva MD    Anesthesia Type: general ASA Status: 4          Anesthesia Type: No value filed.     Gabino Phase I: Gabino Score: 10    Gabino Phase II:        Anesthesia Post Evaluation    Comments: Postoperative Anesthesia Note    Name:    Waldemar Rolon  MRN:      [id number]    Patient Vitals in the past 12 hrs:  06/20/23 1845, BP:(!) 143/77, Pulse:95, SpO2:99 %  06/20/23 1843, BP:(!) 143/77, Pulse:88, SpO2:99 %  06/20/23 1840, Pulse:86, SpO2:99 %  06/20/23 1838, BP:(!) 147/51, Temp:97.2 °F (36.2 °C), Temp src:Temporal, Pulse:87, Resp:18, SpO2:99 %  06/20/23 1600, BP:(!) 128/102, Temp:(!) 96.6 °F (35.9 °C), Temp src:Temporal, Pulse:(!) 102, SpO2:97 %  06/20/23 1534, BP:118/74, Pulse:72, SpO2:97 %  06/20/23 1423, BP:(!) 94/55, Temp:97.2 °F (36.2 °C), Temp src:Oral, Pulse:61, Resp:22  06/20/23 1422, SpO2:98 %  06/20/23 1136, BP:99/65, Temp:97.8 °F (36.6 °C), Temp src:Oral, Pulse:64, Resp:18, SpO2:97 %  06/20/23 0955, BP:124/80, Temp:97.5 °F (36.4 °C), Temp src:Oral, Pulse:66, Resp:18, SpO2:97 %     LABS:    CBC  Lab Results       Component                Value               Date/Time                  WBC                      9.3                 06/19/2023 05:13 AM        HGB                      13.2 (L)            06/20/2023 01:47 PM        HCT                      39.9 (L)            06/20/2023 01:47 PM        PLT                      138                 06/19/2023 05:13 AM   RENAL  Lab Results       Component                Value

## 2023-06-20 NOTE — PLAN OF CARE
Problem: Safety - Adult  Goal: Free from fall injury  Outcome: Progressing  Flowsheets (Taken 6/20/2023 0055)  Free From Fall Injury: Instruct family/caregiver on patient safety     Problem: Genitourinary - Adult  Goal: Urinary catheter remains patent  6/20/2023 0055 by Yan Pickens RN  Outcome: Progressing     Problem: Genitourinary - Adult  Goal: Absence of urinary retention  Outcome: Not Progressing

## 2023-06-20 NOTE — BRIEF OP NOTE
Brief Postoperative Note      Patient: Dayday Velasco  YOB: 1945  MRN: [id number]    Date of Procedure: 6/20/2023    Pre-Op Diagnosis Codes:     * Gross hematuria [R31.0]    Post-Op Diagnosis:  same with radiation cystitis       Procedure(s):  CYSTOSCOPY EVACUATION OF CLOTS    Surgeon(s):  Valentino Florian MD    Assistant:  * No surgical staff found *    Anesthesia: General    Estimated Blood Loss (mL): Minimal    Complications: None    Specimens:   * No specimens in log *    Implants:  * No implants in log *      Drains:   Urinary Catheter 06/20/23 [address] Way (Active)       [REMOVED] Urinary Catheter 06/18/23 (Removed)   Catheter Indications Urinary retention (acute or chronic), continuous bladder irrigation or bladder outlet obstruction 06/20/23 0955   Site Assessment Pink 06/20/23 0955   Urine Color Yellow;Straw 06/20/23 0955   Urine Appearance Clear 06/20/23 0955   Collection Container Standard 06/20/23 0955   Securement Method Securing device (Describe) 06/20/23 0955   Catheter Care  Perineal wipes 06/20/23 0043   Catheter Best Practices  Drainage tube clipped to bed;Catheter secured to thigh; Tamper seal intact; Bag below bladder 06/20/23 0955   Status Draining;Patent 06/20/23 0955   Output (mL) 0 mL 06/19/23 0349       Findings: inflamed bladder consistent with radiation cystitis, no bladder tumors    Plan- voiding trial in AM      Electronically signed by Valentino Florian MD on 6/20/2023 at 6:29 PM

## 2023-06-21 VITALS
HEART RATE: 70 BPM | RESPIRATION RATE: 18 BRPM | DIASTOLIC BLOOD PRESSURE: 70 MMHG | HEIGHT: 73 IN | SYSTOLIC BLOOD PRESSURE: 114 MMHG | WEIGHT: 258.2 LBS | TEMPERATURE: 97.4 F | OXYGEN SATURATION: 99 % | BODY MASS INDEX: 34.22 KG/M2

## 2023-06-21 LAB
ANION GAP SERPL CALCULATED.3IONS-SCNC: 8 MMOL/L (ref 3–16)
BASOPHILS # BLD: 0.1 K/UL (ref 0–0.2)
BASOPHILS NFR BLD: 0.9 %
BUN SERPL-MCNC: 41 MG/DL (ref 7–20)
CALCIUM SERPL-MCNC: 8.6 MG/DL (ref 8.3–10.6)
CHLORIDE SERPL-SCNC: 105 MMOL/L (ref 99–110)
CO2 SERPL-SCNC: 22 MMOL/L (ref 21–32)
CREAT SERPL-MCNC: 1.4 MG/DL (ref 0.8–1.3)
DEPRECATED RDW RBC AUTO: 14.3 % (ref 12.4–15.4)
EOSINOPHIL # BLD: 0.3 K/UL (ref 0–0.6)
EOSINOPHIL NFR BLD: 3.6 %
GFR SERPLBLD CREATININE-BSD FMLA CKD-EPI: 52 ML/MIN/{1.73_M2}
GLUCOSE BLD-MCNC: 181 MG/DL (ref 70–99)
GLUCOSE BLD-MCNC: 188 MG/DL (ref 70–99)
GLUCOSE BLD-MCNC: 199 MG/DL (ref 70–99)
GLUCOSE BLD-MCNC: 228 MG/DL (ref 70–99)
GLUCOSE BLD-MCNC: 282 MG/DL (ref 70–99)
GLUCOSE BLD-MCNC: 302 MG/DL (ref 70–99)
GLUCOSE SERPL-MCNC: 242 MG/DL (ref 70–99)
HCT VFR BLD AUTO: 38.2 % (ref 40.5–52.5)
HCT VFR BLD AUTO: 39.8 % (ref 40.5–52.5)
HCT VFR BLD AUTO: 40.9 % (ref 40.5–52.5)
HGB BLD-MCNC: 12.8 G/DL (ref 13.5–17.5)
HGB BLD-MCNC: 13.2 G/DL (ref 13.5–17.5)
HGB BLD-MCNC: 13.5 G/DL (ref 13.5–17.5)
LYMPHOCYTES # BLD: 1.2 K/UL (ref 1–5.1)
LYMPHOCYTES NFR BLD: 15.8 %
MCH RBC QN AUTO: 29.7 PG (ref 26–34)
MCHC RBC AUTO-ENTMCNC: 33.5 G/DL (ref 31–36)
MCV RBC AUTO: 88.9 FL (ref 80–100)
MONOCYTES # BLD: 0.7 K/UL (ref 0–1.3)
MONOCYTES NFR BLD: 9.6 %
NEUTROPHILS # BLD: 5.2 K/UL (ref 1.7–7.7)
NEUTROPHILS NFR BLD: 70.1 %
PERFORMED ON: ABNORMAL
PLATELET # BLD AUTO: 135 K/UL (ref 135–450)
PMV BLD AUTO: 9.8 FL (ref 5–10.5)
POTASSIUM SERPL-SCNC: 4.3 MMOL/L (ref 3.5–5.1)
RBC # BLD AUTO: 4.3 M/UL (ref 4.2–5.9)
SODIUM SERPL-SCNC: 135 MMOL/L (ref 136–145)
WBC # BLD AUTO: 7.4 K/UL (ref 4–11)

## 2023-06-21 PROCEDURE — 6370000000 HC RX 637 (ALT 250 FOR IP): Performed by: INTERNAL MEDICINE

## 2023-06-21 PROCEDURE — 80048 BASIC METABOLIC PNL TOTAL CA: CPT

## 2023-06-21 PROCEDURE — 85018 HEMOGLOBIN: CPT

## 2023-06-21 PROCEDURE — 97110 THERAPEUTIC EXERCISES: CPT

## 2023-06-21 PROCEDURE — 6370000000 HC RX 637 (ALT 250 FOR IP): Performed by: UROLOGY

## 2023-06-21 PROCEDURE — 36415 COLL VENOUS BLD VENIPUNCTURE: CPT

## 2023-06-21 PROCEDURE — 97116 GAIT TRAINING THERAPY: CPT

## 2023-06-21 PROCEDURE — 85025 COMPLETE CBC W/AUTO DIFF WBC: CPT

## 2023-06-21 PROCEDURE — 85014 HEMATOCRIT: CPT

## 2023-06-21 PROCEDURE — 99232 SBSQ HOSP IP/OBS MODERATE 35: CPT | Performed by: INTERNAL MEDICINE

## 2023-06-21 PROCEDURE — 2580000003 HC RX 258: Performed by: UROLOGY

## 2023-06-21 PROCEDURE — 6370000000 HC RX 637 (ALT 250 FOR IP): Performed by: STUDENT IN AN ORGANIZED HEALTH CARE EDUCATION/TRAINING PROGRAM

## 2023-06-21 RX ORDER — CARVEDILOL 6.25 MG/1
6.25 TABLET ORAL 2 TIMES DAILY WITH MEALS
Qty: 60 TABLET | Refills: 0 | Status: SHIPPED | OUTPATIENT
Start: 2023-06-22 | End: 2023-06-21 | Stop reason: SDUPTHER

## 2023-06-21 RX ORDER — INSULIN LISPRO 100 [IU]/ML
0-16 INJECTION, SOLUTION INTRAVENOUS; SUBCUTANEOUS
Status: DISCONTINUED | OUTPATIENT
Start: 2023-06-21 | End: 2023-06-21 | Stop reason: HOSPADM

## 2023-06-21 RX ORDER — CEFDINIR 300 MG/1
300 CAPSULE ORAL 2 TIMES DAILY
Qty: 10 CAPSULE | Refills: 0 | Status: ON HOLD | OUTPATIENT
Start: 2023-06-21 | End: 2023-06-26

## 2023-06-21 RX ORDER — CARVEDILOL 6.25 MG/1
6.25 TABLET ORAL 2 TIMES DAILY WITH MEALS
Qty: 60 TABLET | Refills: 0 | Status: ON HOLD | OUTPATIENT
Start: 2023-06-22

## 2023-06-21 RX ORDER — CEFDINIR 300 MG/1
300 CAPSULE ORAL 2 TIMES DAILY
Qty: 10 CAPSULE | Refills: 0 | Status: SHIPPED | OUTPATIENT
Start: 2023-06-21 | End: 2023-06-21 | Stop reason: SDUPTHER

## 2023-06-21 RX ORDER — CARVEDILOL 6.25 MG/1
6.25 TABLET ORAL 2 TIMES DAILY WITH MEALS
Status: DISCONTINUED | OUTPATIENT
Start: 2023-06-21 | End: 2023-06-21 | Stop reason: HOSPADM

## 2023-06-21 RX ORDER — INSULIN LISPRO 100 [IU]/ML
0-4 INJECTION, SOLUTION INTRAVENOUS; SUBCUTANEOUS NIGHTLY
Status: DISCONTINUED | OUTPATIENT
Start: 2023-06-21 | End: 2023-06-21 | Stop reason: HOSPADM

## 2023-06-21 RX ADMIN — ATORVASTATIN CALCIUM 40 MG: 40 TABLET, FILM COATED ORAL at 09:46

## 2023-06-21 RX ADMIN — SODIUM CHLORIDE, PRESERVATIVE FREE 10 ML: 5 INJECTION INTRAVENOUS at 09:47

## 2023-06-21 RX ADMIN — ISOSORBIDE MONONITRATE 30 MG: 30 TABLET, EXTENDED RELEASE ORAL at 09:47

## 2023-06-21 RX ADMIN — CLOPIDOGREL BISULFATE 75 MG: 75 TABLET ORAL at 18:20

## 2023-06-21 RX ADMIN — SACUBITRIL AND VALSARTAN 0.5 TABLET: 24; 26 TABLET, FILM COATED ORAL at 09:46

## 2023-06-21 RX ADMIN — ASPIRIN 81 MG: 81 TABLET, COATED ORAL at 09:46

## 2023-06-21 RX ADMIN — INSULIN LISPRO 12 UNITS: 100 INJECTION, SOLUTION INTRAVENOUS; SUBCUTANEOUS at 12:36

## 2023-06-21 RX ADMIN — FUROSEMIDE 40 MG: 40 TABLET ORAL at 09:46

## 2023-06-21 RX ADMIN — CARVEDILOL 6.25 MG: 6.25 TABLET, FILM COATED ORAL at 18:20

## 2023-06-21 RX ADMIN — CARVEDILOL 3.12 MG: 3.12 TABLET, FILM COATED ORAL at 09:47

## 2023-06-21 NOTE — PLAN OF CARE
Problem: Safety - Adult  Goal: Free from fall injury  6/20/2023 2328 by Shane Parks RN  Outcome: Progressing  Flowsheets (Taken 6/20/2023 2328)  Free From Fall Injury: Instruct family/caregiver on patient safety     Problem: Genitourinary - Adult  Goal: Urinary catheter remains patent  Outcome: Progressing  Flowsheets (Taken 6/20/2023 2328)  Urinary catheter remains patent:   Assess patency of urinary catheter   Irrigate catheter per Licensed Independent Practitioner order if indicated and notify Licensed Independent Practitioner if unable to irrigate     Problem: Pain  Goal: Verbalizes/displays adequate comfort level or baseline comfort level  Outcome: Progressing  Flowsheets (Taken 6/20/2023 2331)  Verbalizes/displays adequate comfort level or baseline comfort level:   Encourage patient to monitor pain and request assistance   Assess pain using appropriate pain scale   Administer analgesics based on type and severity of pain and evaluate response     Problem: Genitourinary - Adult  Goal: Absence of urinary retention  Outcome: Not Progressing  Flowsheets (Taken 6/20/2023 2328)  Absence of urinary retention:   Assess patients ability to void and empty bladder   Monitor intake/output and perform bladder scan as needed   Place urinary catheter per Licensed Independent Practitioner order if needed  Note: Voiding trial in AM

## 2023-06-21 NOTE — DISCHARGE SUMMARY
resolved   Radiation cystitis   Hx of prostate CA s/p radiation  CAD s/p CABG (1990s) and YUN to SVG-OM1 (2/2023)  CKD stage III  HFrEF  - EF 25-30%  Ischemic cardiomyopathy  HTN  HLD  Type 2 diabetes mellitus with hyperglycemia     Physical Exam Performed:      /70   Pulse 70   Temp 97.4 °F (36.3 °C) (Oral)   Resp 18   Ht 6' 1\" (1.854 m)   Wt 258 lb 3.2 oz (117.1 kg)   SpO2 99%   BMI 34.07 kg/m²       General appearance:  No apparent distress, appears stated age and cooperative. Respiratory:  Normal respiratory effort. Cardiovascular:  Regular rate and rhythm. Abdomen:  Soft, non-tender, non-distended. Musculoskelatal:  No edema  Neurologic:  Non-focal  Psychiatric:  Alert and oriented    Patient Discharge Instructions: Follow up:    1. Primary Care Provider [facility], APRN - NP in the next 1-2 weeks. 2. Follow up Urology Dr. Nilo Kelley in 1 month     The patient was seen and examined on day of discharge and this discharge summary is in conjunction with any daily progress note from day of discharge.  Time spent on discharge: 35 minutes in the examination, evaluation, counseling and review of medications and discharge plan.    ------------------------------------------------------------------------------------------------------------------------------------------------------    Discharge Medications:   Current Discharge Medication List        START taking these medications    Details   cefdinir (OMNICEF) 300 MG capsule Take 1 capsule by mouth 2 times daily for 5 days  Qty: 10 capsule, Refills: 0           Current Discharge Medication List        CONTINUE these medications which have CHANGED    Details   carvedilol (COREG) 6.25 MG tablet Take 1 tablet by mouth 2 times daily (with meals)  Qty: 60 tablet, Refills: 0           Current Discharge Medication List        CONTINUE these medications which have NOT CHANGED    Details   clopidogrel (PLAVIX) 75 MG tablet Take 1 tablet by

## 2023-06-21 NOTE — OP NOTE
[facility]                 Dex Draper                                OPERATIVE REPORT    PATIENT NAME: Rizwan Sanford                    :        1945  MED REC NO:   [id number]                          ROOM:       0321  ACCOUNT NO:   [de-identified]                           ADMIT DATE: 2023  PROVIDER:     Celso Kelley. Levy Sow MD    DATE OF PROCEDURE:  2023    PREOPERATIVE DIAGNOSES:  Gross hematuria, urinary clot retention. POSTOPERATIVE DIAGNOSES:  Gross hematuria, urinary clot retention with  radiation cystitis. PROCEDURE PERFORMED:  Cystoscopy with clot evacuation. SURGEON:  Celso Kelley. Levy Sow MD    INDICATIONS FOR PROCEDURE:  The patient is a 79-year-old male who has  urinary clot retention, hematuria, history of radiation for prostate  cancer. He has a catheter and is on anticoagulation. The risks and  benefits of a cystoscopy and clot evacuation and indicated procedures  were discussed with the patient. He agreed to proceed. DESCRIPTION OF PROCEDURE:  The patient had preoperative antibiotics,  general anesthesia, was in lithotomy position. Genitalia were prepped  and draped in the usual sterile fashion. A 21-Belizean rigid cystoscope  was inserted into the patient's urethra. The urethra was normal.  The  patient does have an inflamed-looking prostate and I used an Ellik  evacuator to get a mild to moderate amount of clot out. I then looked  sequentially into the bladder, did not see any tumors or stones, but  there was some inflammation of the bladder consistent with radiation  cystitis. After the bladder was drained, I placed a 22 Bel[place]an three-way  catheter and started the patient on a continuous bladder irrigation. He  was awoken and sent to the recovery room. He tolerated the procedure  well. PLAN:  We are going to do a voiding trial tomorrow and see if he can  urinate.     ESTIMATED BLOOD LOSS:  0

## 2023-06-22 NOTE — PROGRESS NOTES
4 Eyes Skin Assessment     The patient is being assess for  Admission    I agree that 2 RN's have performed a thorough Head to Toe Skin Assessment on the patient. ALL assessment sites listed below have been assessed. Areas assessed by both nurses:   [x]   Head, Face, and Ears   [x]   Shoulders, Back, and Chest  [x]   Arms, Elbows, and Hands   [x]   Coccyx, Sacrum, and Ischum  [x]   Legs, Feet, and Heels        Does the Patient have Skin Breakdown?   No         Curt Prevention initiated:  Yes   Wound Care Orders initiated:  No      [facility] nurse consulted for Pressure Injury (Stage 3,4, Unstageable, DTI, NWPT, and Complex wounds):  No      Nurse 1 eSignature: Electronically signed by Laila Oconnor RN on 6/19/23 at 12:28 AM EDT    **SHARE this note so that the co-signing nurse is able to place an eSignature**    Nurse 2 eSignature: Electronically signed by Eyal Platt RN on 6/19/23 at 6:48 AM EDT
4 Eyes Skin Assessment     The patient is being assess for  Transfer to New Unit    I agree that 2 RN's have performed a thorough Head to Toe Skin Assessment on the patient. ALL assessment sites listed below have been assessed. Areas assessed by both nurses: Faustino Chen RN   [x]   Head, Face, and Ears   [x]   Shoulders, Back, and Chest  [x]   Arms, Elbows, and Hands   [x]   Coccyx, Sacrum, and IschIum  [x]   Legs, Feet, and Heels        Does the Patient have Skin Breakdown?   Blanchable redness on sacrum         Curt Prevention initiated:  No   Wound Care Orders initiated:  No      [facility] nurse consulted for Pressure Injury (Stage 3,4, Unstageable, DTI, NWPT, and Complex wounds), New and Established Ostomies:  No      Nurse 1 eSignature: Electronically signed by Karthik Vera RN on 6/19/23 at 7:01 PM EDT    **SHARE this note so that the co-signing nurse is able to place an eSignature**    Nurse 2 eSignature: Electronically signed by Li Jarvis RN on 6/19/23 at 7:05 PM EDT
[address]   Progress Note  Cardiology    CC: hematuria    HPI: feels well. Blood in urine decreased     Past Medical History   has no past medical history on file. Past Surgical History   has a past surgical history that includes Cystoscopy (N/A, 6/20/2023). Social History   reports that he quit smoking about 29 years ago. His smoking use included cigarettes. He quit smokeless tobacco use about 12 years ago. His smokeless tobacco use included chew. He reports that he does not drink alcohol and does not use drugs. Family History  family history is not on file. Medications  Prior to Admission medications    Medication Sig Start Date End Date Taking? Authorizing Provider   traMADol (ULTRAM) 50 MG tablet Take 1 tablet by mouth daily. 5/10/23   Historical Provider, MD   carvedilol (COREG) 3.125 MG tablet Take 1 tablet by mouth 2 times daily (with meals) 5/31/23   Lianna Rich MD   clopidogrel (PLAVIX) 75 MG tablet Take 1 tablet by mouth daily 5/31/23   Lianna Rich MD   aspirin 81 MG EC tablet Take 1 tablet by mouth daily 5/31/23   Lianna Rich MD   sacubitril-valsartan (ENTRESTO) 24-26 MG per tablet Take 1 tablet by mouth 2 times daily 5/31/23   Lianna Rich MD   isosorbide mononitrate (IMDUR) 30 MG extended release tablet Take 1 tablet by mouth daily 4/12/23   ZION Bryant CNP   furosemide (LASIX) 40 MG tablet Take 1 tablet by mouth daily Each morning 3/13/23   Virginie Colindres MD   insulin detemir (LEVEMIR FLEXTOUCH) 100 UNIT/ML injection pen Inject 30 Units into the skin 2 times daily 2/21/23   ZION Galvez CNP   tamsulosin (FLOMAX) 0.4 MG capsule Take 2 capsules by mouth at bedtime 2/21/23   ZION Galvez CNP   atorvastatin (LIPITOR) 40 MG tablet Take 1 tablet by mouth daily Take at bedtime    Historical Provider, MD       Allergies  Latex    Review of Systems:   Reviewed.  No changes except as noted in HPI and A/P      Lab Results   Component Value
Hospital Medicine Progress Note      Date of Admission: 6/18/2023  Hospital Day: 2    Chief Admission Complaint:  Blood in the urine since this morning     Subjective:   bleeing slowed down , pink urine , no ab pain or fever     Presenting Admission History:       69 y.o. male who was transferred  to [facility] from [facility] ER with above complaint. PMHx significant for CAD stent CHF ejection fraction 15 to 20% in February 2023, diabetes, kidney stone, prostate cancer, chronic kidney disease stage III. He went to [facility] ER with a complaint of hematuria. He does not have abdominal pain back pain fevers chills nausea vomiting diarrhea or change in mental status. He has a history of CAD CABG and underwent stent placement in February 2023.- Drug-eluting stent. He is currently on Plavix and aspirin.   He denies shortness of breath at rest chest pain dizziness syncope or worsening lower extremity edema    Assessment/Plan:      Current Principal Problem:  Hematuria    Painless hematuria-CT scan shows bladder mass/ clot -admitted, CBI that was recommended by urology at [facility], continue, urology evaluation over here, monitor H&H every 6 hours- hb stable so far , type and screen, PRBC as needed, patient already got Plavix 6/18 y, hold, cardiology evaluation advised on medications  Urology prefer to hold the Plavix  DW urology - possible cysto tomorrow      Chronic combined systolic and diastolic CHF ejection fraction 15 to 20% in February 2023-currently no evidence of acute fluid overload he is comfortable-continue home dose of Lasix and monitor electrolytes     CKD type III and mild hyperkalemia-given history of hematuria avoid nephrotoxic medications hold Entresto monitor BMP- stable so far      Diabetes-monitor blood sugar with low correctional insulin hemoglobin A1c he has been taking Lantus 20 units twice daily, start 8 units nightly now and adjust the medications according to blood sugar  BS
Hospitalist Progress Note  2023      Subjective:   Admit Date: 2023  PCP: ZION Srinivasan NP  Room#: 6164/8144-96    Interval History: No overnight issues. Feels sx are improving today. VSS. Murillo in place. Denies chest pain, sob, abdominal pain, nausea, vomiting, diarrhea, constipation, fevers, or chills. Diet NPO Exceptions are: Sips of Water with Meds   Patient Vitals for the past 96 hrs (Last 3 readings):   Weight   23 0548 258 lb 3.2 oz (117.1 kg)   23 0009 270 lb (122.5 kg)     24HR INTAKE/OUTPUT:    Intake/Output Summary (Last 24 hours) at 2023 1259  Last data filed at 2023 0957  Gross per 24 hour   Intake 10 ml   Output -2050 ml   Net 2060 ml     Past Medical History:    No past medical history on file. LABS:   CBC:   Recent Labs     23  0513 23  1347 23  2004 23  0224 23  0729   WBC 9.3  --   --   --   --    RBC 4.11*  --   --   --   --    HGB 12.5*  12.4*   < > 13.1* 12.3* 12.8*   HCT 36.9*  36.9*   < > 39.7* 37.6* 38.5*   MCV 89.7  --   --   --   --    RDW 14.6  --   --   --   --      --   --   --   --     < > = values in this interval not displayed. BMP:  Recent Labs     23  05      K 4.4      CO2 23   BUN 37*   CREATININE 1.4*   GLUCOSE 180*   CALCIUM 8.5   ANIONGAP 8     LIVER PROFILE:No results for input(s): AST, ALT, BILITOT, ALKPHOS, LABALBU, PROT in the last 72 hours. PT/INR:   Recent Labs     23  2243   PROTIME 14.9*   INR 1.17*     CARDIAC ENZYMES: No results for input(s): TROPONINI in the last 72 hours. Procalcitonin: No results found for: PROCAL  COVID-19 PCR: No results for input(s): COVID19 in the last 72 hours. Objective:   Vitals: BP 99/65   Pulse 64   Temp 97.8 °F (36.6 °C) (Oral)   Resp 18   Ht 6' 1\" (1.854 m)   Wt 258 lb 3.2 oz (117.1 kg)   SpO2 97%   BMI 34.07 kg/m²   Pulse Ox:  SpO2  Av.7 %  Min: 97 %  Max: 99 %  Supplemental O2:    General
Physical Therapy  Facility/Department: [facility] C3 TELE/MED SURG/ONC  Physical Therapy Initial Assessment/Treatment     Name: Christian Mcghee  : 1945  MRN: [id number]  Date of Service: 2023    Discharge Recommendations:  Home with assist PRN, Outpatient PT   PT Equipment Recommendations  Equipment Needed: No      Patient Diagnosis(es): There were no encounter diagnoses. Past Medical History:  has no past medical history on file. Past Surgical History:  has no past surgical history on file. Assessment   Body Structures, Functions, Activity Limitations Requiring Skilled Therapeutic Intervention: Decreased functional mobility ; Decreased endurance;Decreased balance;Decreased strength  Assessment: Pt to [facility] with diagnosis of hematuria. PTA pt lives with spouse in mobile home with level entry. Pt was independent with transfers and ambulation using cane. States he does not ambulate far at baseline and will use electr scooter in stores. Pt currently presents slightly below baseline. Able to complete transfers with SBA and ambulate with RW and CGA. Pt will benefit from skilled PT services to address current deficits. It is anticipated pt will be safe to DC home with assist PRN and continue to OPPT.   Therapy Prognosis: Good  Decision Making: Low Complexity  Requires PT Follow-Up: Yes  Activity Tolerance  Activity Tolerance: Patient tolerated evaluation without incident;Patient tolerated treatment well     Plan   Physcial Therapy Plan  General Plan: 3-5 times per week  Current Treatment Recommendations: Strengthening, ROM, Balance training, Gait training, Home exercise program, Safety education & training, Therapeutic activities, Functional mobility training, Transfer training, Patient/Caregiver education & training, Pain management, Endurance training, Positioning  Safety Devices  Type of Devices: Left in chair, Nurse notified, Gait belt, Chair alarm in place, Call light within reach, Patient at risk for falls,
Post void residual 0 mL.
Pt's verbal and written discharge instructions given, all questions answered. IV removed. Follow up appointments made and discussed. New medications reviewed. Pt left in stable condition via wheelchair to private car with family.
Received patient from PACU, alert and oriented x4. Vitals are stable. Denies pain nor dizziness. Very good appetite. On accucheck monitoring ACHS + 2am. Presence of 3-way mccoy with ongoing CBI, draining a clear output. On tele monitoring. Voiding trial in AM. Call bell within reach. Bed alarm on. Maintained a calm and comfortable environment. Shift assessment updated and documented.
Received patient on a chair, alert and oriented x4. Assisted back into bed. Presence of a cmcoy catheter with ongoing CBI. Accucheck monitoring ACHS, bedtime result is 315-scheduled humalog 4 units SC given. On tele monitoring. Instructed to have nothing by mouth at midnight for planned cystoscopy tomorrow. Call bell within reach. Bed alarm on. Maintained a calm and comfortable environment. Shift assessment updated and documented.
Urology Progress Note      Subjective: Evert Lopez denies complaints     Vitals:  /80   Pulse 66   Temp 97.5 °F (36.4 °C) (Oral)   Resp 18   Ht 6' 1\" (1.854 m)   Wt 258 lb 3.2 oz (117.1 kg)   SpO2 97%   BMI 34.07 kg/m²   Temp  Av.7 °F (36.5 °C)  Min: 97.3 °F (36.3 °C)  Max: 98.1 °F (36.7 °C)    Intake/Output Summary (Last 24 hours) at 2023 1017  Last data filed at 2023 0957  Gross per 24 hour   Intake 490 ml   Output -2050 ml   Net 2540 ml       Exam:   Physical:   Well developed, well nourished in no acute distress  Mood indicates no abnormalities. Pt doesnt appear depressed  Orientated to time and place  Neck is supple, trachea is midline  Respiratory effort is normal  Cardiovascular show no extremity swelling  Abdomen no masses or hernias are palpated, there is no tenderness. Skin show no abnormal lesions     Male :  Penis appears normal and circumcised  Urethral meatus is normal in size and location  3 way catheter draining clear urine on slow drip CBI    Labs:  WBC:    Lab Results   Component Value Date/Time    WBC 9.3 2023 05:13 AM     Hemoglobin/Hematocrit:    Lab Results   Component Value Date/Time    HGB 12.8 2023 07:29 AM    HCT 38.5 2023 07:29 AM     BMP:    Lab Results   Component Value Date/Time     2023 05:13 AM    K 4.4 2023 05:13 AM     2023 05:13 AM    CO2 23 2023 05:13 AM    BUN 37 2023 05:13 AM    LABALBU 3.5 2023 05:46 AM    CREATININE 1.4 2023 05:13 AM    CALCIUM 8.5 2023 05:13 AM    LABGLOM 52 2023 05:13 AM    LABGLOM 42 2023 12:00 AM     PT/INR:    Lab Results   Component Value Date/Time    PROTIME 14.9 2023 10:43 PM    INR 1.17 2023 10:43 PM     PTT:  No results found for: APTT[APTT    Imaging:      CT AP from outside hospital   \"Distended bladder contains increased density in the bladder bass with diameter of 7.3 cm, this is likely intraluminal clot.
Urology Progress Note      Subjective: Jordan Wilson denies complaints   Cystoscopy showed radiation cystitis     Vitals:  /74   Pulse 86   Temp 97.4 °F (36.3 °C) (Oral)   Resp 18   Ht 6' 1\" (1.854 m)   Wt 258 lb 3.2 oz (117.1 kg)   SpO2 94%   BMI 34.07 kg/m²   Temp  Av.5 °F (36.4 °C)  Min: 96.6 °F (35.9 °C)  Max: 98.2 °F (36.8 °C)    Intake/Output Summary (Last 24 hours) at 2023 7481  Last data filed at 2023 2219  Gross per 24 hour   Intake 400 ml   Output -4950 ml   Net 5350 ml         Exam:   Physical:   Well developed, well nourished in no acute distress  Mood indicates no abnormalities. Pt doesnt appear depressed  Orientated to time and place     Male :  Penis appears normal and circumcised  Urethral meatus is normal in size and location  3 way catheter draining clear urine     Labs:  WBC:    Lab Results   Component Value Date/Time    WBC 7.4 2023 02:15 AM     Hemoglobin/Hematocrit:    Lab Results   Component Value Date/Time    HGB 13.2 2023 07:55 AM    HCT 39.8 2023 07:55 AM     BMP:    Lab Results   Component Value Date/Time     2023 02:14 AM    K 4.3 2023 02:14 AM     2023 02:14 AM    CO2 22 2023 02:14 AM    BUN 41 2023 02:14 AM    LABALBU 3.5 2023 05:46 AM    CREATININE 1.4 2023 02:14 AM    CALCIUM 8.6 2023 02:14 AM    LABGLOM 52 2023 02:14 AM    LABGLOM 42 2023 12:00 AM     PT/INR:    Lab Results   Component Value Date/Time    PROTIME 14.9 2023 10:43 PM    INR 1.17 2023 10:43 PM     PTT:  No results found for: APTT[APTT    Imaging:      CT AP from outside hospital   \"Distended bladder contains increased density in the bladder bass with diameter of 7.3 cm, this is likely intraluminal clot.   The bladder wall is normal in thickness\"         Cystoscopy 23 with Dr Nitin Murphy:  POSTOPERATIVE DIAGNOSES:  Gross hematuria, urinary clot retention with  radiation
History  Social/Functional History  Lives With: Spouse  Type of Home: Mobile home  Home Layout: One level  Home Access: Level entry  Bathroom Shower/Tub: Tub/Shower unit  Bathroom Toilet: Standard  Bathroom Equipment: None  Home Equipment: Cane, Rollator  Has the patient had two or more falls in the past year or any fall with injury in the past year?: No  Receives Help From: Family (Son)  ADL Assistance: Independent  Homemaking Responsibilities: No (wife)  Ambulation Assistance: Needs assistance (cane, per pt \"can't walk very far\" d/t L hip pain)  Transfer Assistance: Independent  Active : Yes  Occupation: Retired  Type of Occupation: Self-employed  Leisure & Hobbies: working in [employer], watch TV       Objective   Pulse: 72  [address]: Monitor  BP: 118/74  BP Location: Right upper arm  BP Method: Automatic  Patient Position: Sitting  MAP (Calculated): 89  Respirations: 22  SpO2: 97 %  O2 Device: None (Room air)             Safety Devices  Type of Devices: Left in chair;Nurse notified;Gait belt; Chair alarm in place;Call light within reach    Bed Mobility Training  Bed Mobility Training: No (in chair upon arrival)    Balance  Sitting: Intact  Standing:  (RW)  Standing - Static: Constant support;Good  Standing - Dynamic: Constant support;Good    Transfer Training  Transfer Training: Yes  Interventions: Safety awareness training  Sit to Stand: Stand-by assistance (RW)  Toilet Transfer: Stand-by assistance    Gait  Overall Level of Assistance: Contact-guard assistance  Distance (ft): 100 Feet (100+15)  Assistive Device: Gait belt;Walker, rolling     AROM: Within functional limits  PROM: Within functional limits  Strength: Generally decreased, functional  Coordination: Within functional limits  Tone: Normal  Sensation: Intact    ADL  Additional Comments: Pt denied ADL needs              Vision  Vision: Within Functional Limits  Hearing  Hearing: Within functional limits  Cognition  Overall Cognitive
kg/m²   Pulse Ox: SpO2  Av.9 %  Min: 92 %  Max: 99 %  Supplemental O2:    General appearance:  No apparent distress, appears stated age and cooperative. HEENT:  Pupils equal, round, and reactive to light. Conjunctivae/corneas clear. Respiratory:  Normal respiratory effort. Clear to auscultation, bilaterally without Rales/Wheezes/Rhonchi. Cardiovascular:  Regular rate and rhythm with normal S1/S2 without murmurs, rubs or gallops. Abdomen:  Soft, non-tender, non-distended with normal bowel sounds. Musculoskelatal:  No clubbing, cyanosis or edema bilaterally. Full range of motion without deformity. Neurologic:  Neurovascularly intact without any focal sensory/motor deficits. Cranial nerves: II-XII intact, grossly non-focal.  Psychiatric:  Alert and oriented, thought content appropriate, normal insight  Skin:  Skin color, texture, turgor normal.  No rashes or lesions.   Capillary Refill:  Brisk,< 3 seconds   Peripheral Pulses:  +2 palpable, equal bilaterally   Medications:      sodium chloride 5 mL/hr at 23 2213    dextrose        sacubitril-valsartan  0.5 tablet Oral BID    aspirin  81 mg Oral Daily    atorvastatin  40 mg Oral Daily    carvedilol  3.125 mg Oral BID WC    [Held by provider] clopidogrel  75 mg Oral Daily    furosemide  40 mg Oral Daily    isosorbide mononitrate  30 mg Oral Daily    tamsulosin  0.4 mg Oral Nightly    sodium chloride flush  5-40 mL IntraVENous 2 times per day    insulin lispro  0-4 Units SubCUTAneous TID WC    insulin lispro  0-4 Units SubCUTAneous Nightly    insulin glargine  8 Units SubCUTAneous Nightly    cefTRIAXone (ROCEPHIN) IV  1,000 mg IntraVENous Q24H     Assessment   Gross hematuria - resolved   Radiation cystitis   Hx of prostate CA s/p radiation  CAD s/p CABG () and YUN to SVG-OM1 (2023)  CKD stage III  HFrEF  - EF 25-30%  Ischemic cardiomyopathy  HTN  HLD  Type 2 diabetes mellitus with hyperglycemia   Medical Decision Making    -Patient presents to the
moving to and from a bed to a chair?: A Little  How much help is needed standing up from a chair using your arms?: A Little  How much help is needed walking in hospital room?: A Little  How much help is needed climbing 3-5 steps with a railing?: A Little  AM-PAC Inpatient Mobility Raw Score : 18  AM-PAC Inpatient T-Scale Score : 43.63  Mobility Inpatient CMS 0-100% Score: 46.58  Mobility Inpatient CMS G-Code Modifier : CK      Goals  Short Term Goals  Time Frame for Short Term Goals: 6/27/23- 6/21/23- goals continue unless otherwise noted  Short Term Goal 1: pt will complete bed mobility with mod I  Short Term Goal 2: pt will complete transfers with mod I  Short Term Goal 3: pt will ambulate 150ft with LRAD and supervision  Short Term Goal 4: pt will participate in 12-15 reps of BLE exercises by 6/23/23- 6/21/23- GOAL MET  Additional Goals?: No  Patient Goals   Patient Goals :  \"To go home\"    Education  Patient Education  Education Given To: Patient  Education Provided: Transfer Training;Energy Conservation  Education Method: Verbal  Barriers to Learning: None  Education Outcome: Verbalized understanding;Demonstrated understanding    Therapy Time   Individual Concurrent Group Co-treatment   Time In 1317         Time Out 1342         Minutes 25         Timed Code Treatment Minutes: Faisal PT
[address] [facility]  6/20/2023 8:47 AM

## 2023-06-24 ENCOUNTER — HOSPITAL ENCOUNTER (INPATIENT)
Age: 78
LOS: 1 days | Discharge: HOME | DRG: 663 | End: 2023-06-27
Attending: EMERGENCY MEDICINE | Admitting: INTERNAL MEDICINE
Payer: MEDICARE

## 2023-06-24 ENCOUNTER — APPOINTMENT (OUTPATIENT)
Dept: CT IMAGING | Age: 78
DRG: 663 | End: 2023-06-24
Payer: MEDICARE

## 2023-06-24 DIAGNOSIS — R31.9 HEMATURIA, UNSPECIFIED TYPE: Primary | ICD-10-CM

## 2023-06-24 LAB
ALBUMIN SERPL-MCNC: 3.4 G/DL (ref 3.4–5)
ALBUMIN/GLOB SERPL: 1.4 {RATIO} (ref 1.1–2.2)
ALP SERPL-CCNC: 165 U/L (ref 40–129)
ALT SERPL-CCNC: 22 U/L (ref 10–40)
ANION GAP SERPL CALCULATED.3IONS-SCNC: 5 MMOL/L (ref 3–16)
ANION GAP SERPL CALCULATED.3IONS-SCNC: 7 MMOL/L (ref 3–16)
AST SERPL-CCNC: 20 U/L (ref 15–37)
BASOPHILS # BLD: 0.1 K/UL (ref 0–0.2)
BASOPHILS NFR BLD: 0.9 %
BILIRUB SERPL-MCNC: 0.5 MG/DL (ref 0–1)
BILIRUB UR QL STRIP.AUTO: NEGATIVE
BUN SERPL-MCNC: 37 MG/DL (ref 7–20)
BUN SERPL-MCNC: 39 MG/DL (ref 7–20)
CALCIUM SERPL-MCNC: 8.8 MG/DL (ref 8.3–10.6)
CALCIUM SERPL-MCNC: 9.3 MG/DL (ref 8.3–10.6)
CHLORIDE SERPL-SCNC: 103 MMOL/L (ref 99–110)
CHLORIDE SERPL-SCNC: 108 MMOL/L (ref 99–110)
CLARITY UR: ABNORMAL
CO2 SERPL-SCNC: 24 MMOL/L (ref 21–32)
CO2 SERPL-SCNC: 25 MMOL/L (ref 21–32)
COLOR UR: ABNORMAL
CREAT SERPL-MCNC: 1.3 MG/DL (ref 0.8–1.3)
CREAT SERPL-MCNC: 1.5 MG/DL (ref 0.8–1.3)
DEPRECATED RDW RBC AUTO: 14.4 % (ref 12.4–15.4)
EOSINOPHIL # BLD: 0.2 K/UL (ref 0–0.6)
EOSINOPHIL NFR BLD: 2.5 %
GFR SERPLBLD CREATININE-BSD FMLA CKD-EPI: 48 ML/MIN/{1.73_M2}
GFR SERPLBLD CREATININE-BSD FMLA CKD-EPI: 57 ML/MIN/{1.73_M2}
GLUCOSE BLD-MCNC: 130 MG/DL (ref 70–99)
GLUCOSE BLD-MCNC: 169 MG/DL (ref 70–99)
GLUCOSE SERPL-MCNC: 160 MG/DL (ref 70–99)
GLUCOSE SERPL-MCNC: 189 MG/DL (ref 70–99)
GLUCOSE UR STRIP.AUTO-MCNC: 100 MG/DL
HCT VFR BLD AUTO: 37.2 % (ref 40.5–52.5)
HCT VFR BLD AUTO: 38.5 % (ref 40.5–52.5)
HGB BLD-MCNC: 12.6 G/DL (ref 13.5–17.5)
HGB BLD-MCNC: 12.7 G/DL (ref 13.5–17.5)
HGB UR QL STRIP.AUTO: ABNORMAL
KETONES UR STRIP.AUTO-MCNC: NEGATIVE MG/DL
LEUKOCYTE ESTERASE UR QL STRIP.AUTO: ABNORMAL
LYMPHOCYTES # BLD: 1 K/UL (ref 1–5.1)
LYMPHOCYTES NFR BLD: 13.4 %
MAGNESIUM SERPL-MCNC: 2.2 MG/DL (ref 1.8–2.4)
MCH RBC QN AUTO: 30 PG (ref 26–34)
MCHC RBC AUTO-ENTMCNC: 33.8 G/DL (ref 31–36)
MCV RBC AUTO: 88.6 FL (ref 80–100)
MONOCYTES # BLD: 0.6 K/UL (ref 0–1.3)
MONOCYTES NFR BLD: 8.4 %
NEUTROPHILS # BLD: 5.6 K/UL (ref 1.7–7.7)
NEUTROPHILS NFR BLD: 74.8 %
NITRITE UR QL STRIP.AUTO: NEGATIVE
PERFORMED ON: ABNORMAL
PERFORMED ON: ABNORMAL
PH UR STRIP.AUTO: 7 [PH] (ref 5–8)
PLATELET # BLD AUTO: 151 K/UL (ref 135–450)
PMV BLD AUTO: 10.3 FL (ref 5–10.5)
POTASSIUM SERPL-SCNC: 4.6 MMOL/L (ref 3.5–5.1)
POTASSIUM SERPL-SCNC: 5 MMOL/L (ref 3.5–5.1)
PROT SERPL-MCNC: 5.8 G/DL (ref 6.4–8.2)
PROT UR STRIP.AUTO-MCNC: >=300 MG/DL
RBC # BLD AUTO: 4.2 M/UL (ref 4.2–5.9)
RBC #/AREA URNS HPF: >100 /HPF (ref 0–4)
SODIUM SERPL-SCNC: 133 MMOL/L (ref 136–145)
SODIUM SERPL-SCNC: 139 MMOL/L (ref 136–145)
SP GR UR STRIP.AUTO: 1.02 (ref 1–1.03)
UA COMPLETE W REFLEX CULTURE PNL UR: ABNORMAL
UA DIPSTICK W REFLEX MICRO PNL UR: YES
URN SPEC COLLECT METH UR: ABNORMAL
UROBILINOGEN UR STRIP-ACNC: 0.2 E.U./DL
WBC # BLD AUTO: 7.4 K/UL (ref 4–11)
WBC #/AREA URNS HPF: ABNORMAL /HPF (ref 0–5)

## 2023-06-24 PROCEDURE — 83036 HEMOGLOBIN GLYCOSYLATED A1C: CPT

## 2023-06-24 PROCEDURE — 85018 HEMOGLOBIN: CPT

## 2023-06-24 PROCEDURE — 85014 HEMATOCRIT: CPT

## 2023-06-24 PROCEDURE — 36415 COLL VENOUS BLD VENIPUNCTURE: CPT

## 2023-06-24 PROCEDURE — 81001 URINALYSIS AUTO W/SCOPE: CPT

## 2023-06-24 PROCEDURE — G0378 HOSPITAL OBSERVATION PER HR: HCPCS

## 2023-06-24 PROCEDURE — 80053 COMPREHEN METABOLIC PANEL: CPT

## 2023-06-24 PROCEDURE — 6370000000 HC RX 637 (ALT 250 FOR IP): Performed by: NURSE PRACTITIONER

## 2023-06-24 PROCEDURE — 99285 EMERGENCY DEPT VISIT HI MDM: CPT

## 2023-06-24 PROCEDURE — 83735 ASSAY OF MAGNESIUM: CPT

## 2023-06-24 PROCEDURE — 85025 COMPLETE CBC W/AUTO DIFF WBC: CPT

## 2023-06-24 PROCEDURE — 74176 CT ABD & PELVIS W/O CONTRAST: CPT

## 2023-06-24 RX ORDER — DEXTROSE MONOHYDRATE 100 MG/ML
INJECTION, SOLUTION INTRAVENOUS CONTINUOUS PRN
Status: DISCONTINUED | OUTPATIENT
Start: 2023-06-24 | End: 2023-06-27 | Stop reason: HOSPADM

## 2023-06-24 RX ORDER — ACETAMINOPHEN 650 MG/1
650 SUPPOSITORY RECTAL EVERY 6 HOURS PRN
Status: DISCONTINUED | OUTPATIENT
Start: 2023-06-24 | End: 2023-06-27 | Stop reason: HOSPADM

## 2023-06-24 RX ORDER — SODIUM CHLORIDE 0.9 % (FLUSH) 0.9 %
10 SYRINGE (ML) INJECTION PRN
Status: DISCONTINUED | OUTPATIENT
Start: 2023-06-24 | End: 2023-06-27 | Stop reason: HOSPADM

## 2023-06-24 RX ORDER — POTASSIUM CHLORIDE 20 MEQ/1
40 TABLET, EXTENDED RELEASE ORAL PRN
Status: DISCONTINUED | OUTPATIENT
Start: 2023-06-24 | End: 2023-06-27 | Stop reason: HOSPADM

## 2023-06-24 RX ORDER — INSULIN LISPRO 100 [IU]/ML
0-4 INJECTION, SOLUTION INTRAVENOUS; SUBCUTANEOUS
Status: DISCONTINUED | OUTPATIENT
Start: 2023-06-24 | End: 2023-06-27 | Stop reason: HOSPADM

## 2023-06-24 RX ORDER — LIDOCAINE HYDROCHLORIDE 20 MG/ML
JELLY TOPICAL PRN
Status: DISCONTINUED | OUTPATIENT
Start: 2023-06-24 | End: 2023-06-27 | Stop reason: HOSPADM

## 2023-06-24 RX ORDER — PROCHLORPERAZINE EDISYLATE 5 MG/ML
10 INJECTION INTRAMUSCULAR; INTRAVENOUS EVERY 6 HOURS PRN
Status: DISCONTINUED | OUTPATIENT
Start: 2023-06-24 | End: 2023-06-27 | Stop reason: HOSPADM

## 2023-06-24 RX ORDER — ONDANSETRON 4 MG/1
4 TABLET, ORALLY DISINTEGRATING ORAL EVERY 8 HOURS PRN
Status: DISCONTINUED | OUTPATIENT
Start: 2023-06-24 | End: 2023-06-24 | Stop reason: ALTCHOICE

## 2023-06-24 RX ORDER — SODIUM CHLORIDE 9 MG/ML
INJECTION, SOLUTION INTRAVENOUS PRN
Status: DISCONTINUED | OUTPATIENT
Start: 2023-06-24 | End: 2023-06-27 | Stop reason: HOSPADM

## 2023-06-24 RX ORDER — ONDANSETRON 2 MG/ML
4 INJECTION INTRAMUSCULAR; INTRAVENOUS EVERY 6 HOURS PRN
Status: DISCONTINUED | OUTPATIENT
Start: 2023-06-24 | End: 2023-06-24 | Stop reason: ALTCHOICE

## 2023-06-24 RX ORDER — MAGNESIUM SULFATE 1 G/100ML
1000 INJECTION INTRAVENOUS PRN
Status: DISCONTINUED | OUTPATIENT
Start: 2023-06-24 | End: 2023-06-27 | Stop reason: HOSPADM

## 2023-06-24 RX ORDER — INSULIN LISPRO 100 [IU]/ML
0-4 INJECTION, SOLUTION INTRAVENOUS; SUBCUTANEOUS NIGHTLY
Status: DISCONTINUED | OUTPATIENT
Start: 2023-06-24 | End: 2023-06-27 | Stop reason: HOSPADM

## 2023-06-24 RX ORDER — POTASSIUM CHLORIDE 7.45 MG/ML
10 INJECTION INTRAVENOUS PRN
Status: DISCONTINUED | OUTPATIENT
Start: 2023-06-24 | End: 2023-06-27 | Stop reason: HOSPADM

## 2023-06-24 RX ORDER — SODIUM CHLORIDE 0.9 % (FLUSH) 0.9 %
5-40 SYRINGE (ML) INJECTION EVERY 12 HOURS SCHEDULED
Status: DISCONTINUED | OUTPATIENT
Start: 2023-06-24 | End: 2023-06-27 | Stop reason: HOSPADM

## 2023-06-24 RX ORDER — ACETAMINOPHEN 325 MG/1
650 TABLET ORAL EVERY 6 HOURS PRN
Status: DISCONTINUED | OUTPATIENT
Start: 2023-06-24 | End: 2023-06-27 | Stop reason: HOSPADM

## 2023-06-24 RX ORDER — POLYETHYLENE GLYCOL 3350 17 G/17G
17 POWDER, FOR SOLUTION ORAL DAILY PRN
Status: DISCONTINUED | OUTPATIENT
Start: 2023-06-24 | End: 2023-06-27 | Stop reason: HOSPADM

## 2023-06-24 RX ADMIN — LIDOCAINE HYDROCHLORIDE: 20 JELLY TOPICAL at 13:39

## 2023-06-24 ASSESSMENT — PAIN DESCRIPTION - LOCATION: LOCATION: ABDOMEN

## 2023-06-24 ASSESSMENT — ENCOUNTER SYMPTOMS
EYE PAIN: 0
RHINORRHEA: 0
SHORTNESS OF BREATH: 0
ABDOMINAL PAIN: 0
NAUSEA: 0
SORE THROAT: 0
BACK PAIN: 0
VOMITING: 0

## 2023-06-24 ASSESSMENT — PAIN - FUNCTIONAL ASSESSMENT: PAIN_FUNCTIONAL_ASSESSMENT: 0-10

## 2023-06-24 ASSESSMENT — PAIN SCALES - GENERAL
PAINLEVEL_OUTOF10: 0
PAINLEVEL_OUTOF10: 4

## 2023-06-25 LAB
ANION GAP SERPL CALCULATED.3IONS-SCNC: 7 MMOL/L (ref 3–16)
BASOPHILS # BLD: 0.1 K/UL (ref 0–0.2)
BASOPHILS NFR BLD: 1.2 %
BUN SERPL-MCNC: 34 MG/DL (ref 7–20)
CALCIUM SERPL-MCNC: 8.7 MG/DL (ref 8.3–10.6)
CHLORIDE SERPL-SCNC: 104 MMOL/L (ref 99–110)
CO2 SERPL-SCNC: 22 MMOL/L (ref 21–32)
CREAT SERPL-MCNC: 1.3 MG/DL (ref 0.8–1.3)
DEPRECATED RDW RBC AUTO: 14 % (ref 12.4–15.4)
EOSINOPHIL # BLD: 0.2 K/UL (ref 0–0.6)
EOSINOPHIL NFR BLD: 2.4 %
EST. AVERAGE GLUCOSE BLD GHB EST-MCNC: 157.1 MG/DL
GFR SERPLBLD CREATININE-BSD FMLA CKD-EPI: 57 ML/MIN/{1.73_M2}
GLUCOSE BLD-MCNC: 196 MG/DL (ref 70–99)
GLUCOSE BLD-MCNC: 204 MG/DL (ref 70–99)
GLUCOSE BLD-MCNC: 225 MG/DL (ref 70–99)
GLUCOSE BLD-MCNC: 262 MG/DL (ref 70–99)
GLUCOSE SERPL-MCNC: 218 MG/DL (ref 70–99)
HBA1C MFR BLD: 7.1 %
HCT VFR BLD AUTO: 37.6 % (ref 40.5–52.5)
HGB BLD-MCNC: 12.6 G/DL (ref 13.5–17.5)
LYMPHOCYTES # BLD: 1.1 K/UL (ref 1–5.1)
LYMPHOCYTES NFR BLD: 16.3 %
MCH RBC QN AUTO: 29.5 PG (ref 26–34)
MCHC RBC AUTO-ENTMCNC: 33.4 G/DL (ref 31–36)
MCV RBC AUTO: 88.1 FL (ref 80–100)
MONOCYTES # BLD: 0.7 K/UL (ref 0–1.3)
MONOCYTES NFR BLD: 10.9 %
NEUTROPHILS # BLD: 4.6 K/UL (ref 1.7–7.7)
NEUTROPHILS NFR BLD: 69.2 %
PERFORMED ON: ABNORMAL
PLATELET # BLD AUTO: 149 K/UL (ref 135–450)
PMV BLD AUTO: 10.4 FL (ref 5–10.5)
POTASSIUM SERPL-SCNC: 4.2 MMOL/L (ref 3.5–5.1)
RBC # BLD AUTO: 4.27 M/UL (ref 4.2–5.9)
SODIUM SERPL-SCNC: 133 MMOL/L (ref 136–145)
WBC # BLD AUTO: 6.7 K/UL (ref 4–11)

## 2023-06-25 PROCEDURE — 99222 1ST HOSP IP/OBS MODERATE 55: CPT | Performed by: INTERNAL MEDICINE

## 2023-06-25 PROCEDURE — 51700 IRRIGATION OF BLADDER: CPT

## 2023-06-25 PROCEDURE — 6370000000 HC RX 637 (ALT 250 FOR IP): Performed by: STUDENT IN AN ORGANIZED HEALTH CARE EDUCATION/TRAINING PROGRAM

## 2023-06-25 PROCEDURE — 36415 COLL VENOUS BLD VENIPUNCTURE: CPT

## 2023-06-25 PROCEDURE — 2580000003 HC RX 258

## 2023-06-25 PROCEDURE — 6370000000 HC RX 637 (ALT 250 FOR IP)

## 2023-06-25 PROCEDURE — 80048 BASIC METABOLIC PNL TOTAL CA: CPT

## 2023-06-25 PROCEDURE — 85025 COMPLETE CBC W/AUTO DIFF WBC: CPT

## 2023-06-25 PROCEDURE — G0378 HOSPITAL OBSERVATION PER HR: HCPCS

## 2023-06-25 RX ORDER — ATORVASTATIN CALCIUM 40 MG/1
40 TABLET, FILM COATED ORAL DAILY
Status: DISCONTINUED | OUTPATIENT
Start: 2023-06-25 | End: 2023-06-27 | Stop reason: HOSPADM

## 2023-06-25 RX ORDER — ISOSORBIDE MONONITRATE 30 MG/1
30 TABLET, EXTENDED RELEASE ORAL DAILY
Status: DISCONTINUED | OUTPATIENT
Start: 2023-06-25 | End: 2023-06-27 | Stop reason: HOSPADM

## 2023-06-25 RX ORDER — CARVEDILOL 6.25 MG/1
6.25 TABLET ORAL 2 TIMES DAILY WITH MEALS
Status: DISCONTINUED | OUTPATIENT
Start: 2023-06-25 | End: 2023-06-27 | Stop reason: HOSPADM

## 2023-06-25 RX ORDER — TAMSULOSIN HYDROCHLORIDE 0.4 MG/1
0.8 CAPSULE ORAL NIGHTLY
Status: DISCONTINUED | OUTPATIENT
Start: 2023-06-25 | End: 2023-06-27 | Stop reason: HOSPADM

## 2023-06-25 RX ORDER — CLOPIDOGREL BISULFATE 75 MG/1
75 TABLET ORAL DAILY
Status: DISCONTINUED | OUTPATIENT
Start: 2023-06-25 | End: 2023-06-27 | Stop reason: HOSPADM

## 2023-06-25 RX ORDER — FUROSEMIDE 40 MG/1
40 TABLET ORAL DAILY
Status: DISCONTINUED | OUTPATIENT
Start: 2023-06-25 | End: 2023-06-27 | Stop reason: HOSPADM

## 2023-06-25 RX ADMIN — INSULIN LISPRO 2 UNITS: 100 INJECTION, SOLUTION INTRAVENOUS; SUBCUTANEOUS at 12:16

## 2023-06-25 RX ADMIN — Medication 10 ML: at 09:18

## 2023-06-25 RX ADMIN — INSULIN LISPRO 1 UNITS: 100 INJECTION, SOLUTION INTRAVENOUS; SUBCUTANEOUS at 17:06

## 2023-06-25 RX ADMIN — SACUBITRIL AND VALSARTAN 1 TABLET: 24; 26 TABLET, FILM COATED ORAL at 09:16

## 2023-06-25 RX ADMIN — CARVEDILOL 6.25 MG: 6.25 TABLET, FILM COATED ORAL at 17:05

## 2023-06-25 RX ADMIN — FUROSEMIDE 40 MG: 40 TABLET ORAL at 09:16

## 2023-06-25 RX ADMIN — ATORVASTATIN CALCIUM 40 MG: 40 TABLET, FILM COATED ORAL at 09:16

## 2023-06-25 RX ADMIN — TAMSULOSIN HYDROCHLORIDE 0.8 MG: 0.4 CAPSULE ORAL at 20:21

## 2023-06-25 RX ADMIN — SACUBITRIL AND VALSARTAN 1 TABLET: 24; 26 TABLET, FILM COATED ORAL at 20:21

## 2023-06-25 RX ADMIN — ISOSORBIDE MONONITRATE 30 MG: 30 TABLET, EXTENDED RELEASE ORAL at 09:16

## 2023-06-25 RX ADMIN — CARVEDILOL 6.25 MG: 6.25 TABLET, FILM COATED ORAL at 09:16

## 2023-06-26 ENCOUNTER — ANESTHESIA EVENT (OUTPATIENT)
Dept: OPERATING ROOM | Age: 78
DRG: 663 | End: 2023-06-26
Payer: MEDICARE

## 2023-06-26 ENCOUNTER — ANESTHESIA (OUTPATIENT)
Dept: OPERATING ROOM | Age: 78
DRG: 663 | End: 2023-06-26
Payer: MEDICARE

## 2023-06-26 LAB
ANION GAP SERPL CALCULATED.3IONS-SCNC: 10 MMOL/L (ref 3–16)
BASOPHILS # BLD: 0.1 K/UL (ref 0–0.2)
BASOPHILS NFR BLD: 1.3 %
BUN SERPL-MCNC: 34 MG/DL (ref 7–20)
CALCIUM SERPL-MCNC: 8.8 MG/DL (ref 8.3–10.6)
CHLORIDE SERPL-SCNC: 104 MMOL/L (ref 99–110)
CO2 SERPL-SCNC: 23 MMOL/L (ref 21–32)
CREAT SERPL-MCNC: 1.4 MG/DL (ref 0.8–1.3)
DEPRECATED RDW RBC AUTO: 14 % (ref 12.4–15.4)
EOSINOPHIL # BLD: 0.2 K/UL (ref 0–0.6)
EOSINOPHIL NFR BLD: 3.3 %
GFR SERPLBLD CREATININE-BSD FMLA CKD-EPI: 52 ML/MIN/{1.73_M2}
GLUCOSE BLD-MCNC: 190 MG/DL (ref 70–99)
GLUCOSE BLD-MCNC: 195 MG/DL (ref 70–99)
GLUCOSE BLD-MCNC: 219 MG/DL (ref 70–99)
GLUCOSE BLD-MCNC: 221 MG/DL (ref 70–99)
GLUCOSE BLD-MCNC: 235 MG/DL (ref 70–99)
GLUCOSE SERPL-MCNC: 258 MG/DL (ref 70–99)
HCT VFR BLD AUTO: 39 % (ref 40.5–52.5)
HGB BLD-MCNC: 13.1 G/DL (ref 13.5–17.5)
LYMPHOCYTES # BLD: 1 K/UL (ref 1–5.1)
LYMPHOCYTES NFR BLD: 15.7 %
MCH RBC QN AUTO: 29.7 PG (ref 26–34)
MCHC RBC AUTO-ENTMCNC: 33.5 G/DL (ref 31–36)
MCV RBC AUTO: 88.8 FL (ref 80–100)
MONOCYTES # BLD: 0.7 K/UL (ref 0–1.3)
MONOCYTES NFR BLD: 10.7 %
NEUTROPHILS # BLD: 4.4 K/UL (ref 1.7–7.7)
NEUTROPHILS NFR BLD: 69 %
PERFORMED ON: ABNORMAL
PLATELET # BLD AUTO: 153 K/UL (ref 135–450)
PMV BLD AUTO: 10.8 FL (ref 5–10.5)
POTASSIUM SERPL-SCNC: 4.1 MMOL/L (ref 3.5–5.1)
RBC # BLD AUTO: 4.39 M/UL (ref 4.2–5.9)
SODIUM SERPL-SCNC: 137 MMOL/L (ref 136–145)
WBC # BLD AUTO: 6.4 K/UL (ref 4–11)

## 2023-06-26 PROCEDURE — 3700000000 HC ANESTHESIA ATTENDED CARE: Performed by: UROLOGY

## 2023-06-26 PROCEDURE — 3600000004 HC SURGERY LEVEL 4 BASE: Performed by: UROLOGY

## 2023-06-26 PROCEDURE — 6360000002 HC RX W HCPCS

## 2023-06-26 PROCEDURE — 6360000002 HC RX W HCPCS: Performed by: UROLOGY

## 2023-06-26 PROCEDURE — 2580000003 HC RX 258: Performed by: UROLOGY

## 2023-06-26 PROCEDURE — 6370000000 HC RX 637 (ALT 250 FOR IP): Performed by: STUDENT IN AN ORGANIZED HEALTH CARE EDUCATION/TRAINING PROGRAM

## 2023-06-26 PROCEDURE — 2580000003 HC RX 258: Performed by: ANESTHESIOLOGY

## 2023-06-26 PROCEDURE — 6370000000 HC RX 637 (ALT 250 FOR IP): Performed by: UROLOGY

## 2023-06-26 PROCEDURE — 99232 SBSQ HOSP IP/OBS MODERATE 35: CPT | Performed by: INTERNAL MEDICINE

## 2023-06-26 PROCEDURE — 0TCB8ZZ EXTIRPATION OF MATTER FROM BLADDER, VIA NATURAL OR ARTIFICIAL OPENING ENDOSCOPIC: ICD-10-PCS | Performed by: UROLOGY

## 2023-06-26 PROCEDURE — 2500000003 HC RX 250 WO HCPCS: Performed by: ANESTHESIOLOGY

## 2023-06-26 PROCEDURE — 1200000000 HC SEMI PRIVATE

## 2023-06-26 PROCEDURE — 36415 COLL VENOUS BLD VENIPUNCTURE: CPT

## 2023-06-26 PROCEDURE — 3700000001 HC ADD 15 MINUTES (ANESTHESIA): Performed by: UROLOGY

## 2023-06-26 PROCEDURE — 85025 COMPLETE CBC W/AUTO DIFF WBC: CPT

## 2023-06-26 PROCEDURE — 2580000003 HC RX 258

## 2023-06-26 PROCEDURE — 2709999900 HC NON-CHARGEABLE SUPPLY: Performed by: UROLOGY

## 2023-06-26 PROCEDURE — 80048 BASIC METABOLIC PNL TOTAL CA: CPT

## 2023-06-26 PROCEDURE — 3600000014 HC SURGERY LEVEL 4 ADDTL 15MIN: Performed by: UROLOGY

## 2023-06-26 PROCEDURE — 7100000000 HC PACU RECOVERY - FIRST 15 MIN: Performed by: UROLOGY

## 2023-06-26 PROCEDURE — 6360000002 HC RX W HCPCS: Performed by: ANESTHESIOLOGY

## 2023-06-26 PROCEDURE — 0W3R8ZZ CONTROL BLEEDING IN GENITOURINARY TRACT, VIA NATURAL OR ARTIFICIAL OPENING ENDOSCOPIC: ICD-10-PCS | Performed by: UROLOGY

## 2023-06-26 PROCEDURE — 7100000001 HC PACU RECOVERY - ADDTL 15 MIN: Performed by: UROLOGY

## 2023-06-26 PROCEDURE — A4217 STERILE WATER/SALINE, 500 ML: HCPCS | Performed by: UROLOGY

## 2023-06-26 RX ORDER — MEPERIDINE HYDROCHLORIDE 25 MG/ML
12.5 INJECTION INTRAMUSCULAR; INTRAVENOUS; SUBCUTANEOUS EVERY 5 MIN PRN
Status: DISCONTINUED | OUTPATIENT
Start: 2023-06-26 | End: 2023-06-26 | Stop reason: HOSPADM

## 2023-06-26 RX ORDER — ONDANSETRON 2 MG/ML
4 INJECTION INTRAMUSCULAR; INTRAVENOUS
Status: DISCONTINUED | OUTPATIENT
Start: 2023-06-26 | End: 2023-06-26 | Stop reason: HOSPADM

## 2023-06-26 RX ORDER — SODIUM CHLORIDE, SODIUM LACTATE, POTASSIUM CHLORIDE, CALCIUM CHLORIDE 600; 310; 30; 20 MG/100ML; MG/100ML; MG/100ML; MG/100ML
INJECTION, SOLUTION INTRAVENOUS CONTINUOUS
Status: DISCONTINUED | OUTPATIENT
Start: 2023-06-26 | End: 2023-06-26 | Stop reason: HOSPADM

## 2023-06-26 RX ORDER — SODIUM CHLORIDE 0.9 % (FLUSH) 0.9 %
5-40 SYRINGE (ML) INJECTION EVERY 12 HOURS SCHEDULED
Status: DISCONTINUED | OUTPATIENT
Start: 2023-06-26 | End: 2023-06-26 | Stop reason: SDUPTHER

## 2023-06-26 RX ORDER — PROPOFOL 10 MG/ML
INJECTION, EMULSION INTRAVENOUS PRN
Status: DISCONTINUED | OUTPATIENT
Start: 2023-06-26 | End: 2023-06-26 | Stop reason: SDUPTHER

## 2023-06-26 RX ORDER — MAGNESIUM HYDROXIDE 1200 MG/15ML
LIQUID ORAL CONTINUOUS PRN
Status: COMPLETED | OUTPATIENT
Start: 2023-06-26 | End: 2023-06-26

## 2023-06-26 RX ORDER — GLYCINE 1.5 G/100ML
SOLUTION IRRIGATION PRN
Status: DISCONTINUED | OUTPATIENT
Start: 2023-06-26 | End: 2023-06-26 | Stop reason: ALTCHOICE

## 2023-06-26 RX ORDER — SODIUM CHLORIDE 9 MG/ML
INJECTION, SOLUTION INTRAVENOUS PRN
Status: DISCONTINUED | OUTPATIENT
Start: 2023-06-26 | End: 2023-06-26 | Stop reason: HOSPADM

## 2023-06-26 RX ORDER — SODIUM CHLORIDE 0.9 % (FLUSH) 0.9 %
5-40 SYRINGE (ML) INJECTION PRN
Status: DISCONTINUED | OUTPATIENT
Start: 2023-06-26 | End: 2023-06-26 | Stop reason: SDUPTHER

## 2023-06-26 RX ORDER — SODIUM CHLORIDE 0.9 % (FLUSH) 0.9 %
5-40 SYRINGE (ML) INJECTION EVERY 12 HOURS SCHEDULED
Status: DISCONTINUED | OUTPATIENT
Start: 2023-06-26 | End: 2023-06-26 | Stop reason: HOSPADM

## 2023-06-26 RX ORDER — CEFAZOLIN 2 G/1
INJECTION, POWDER, FOR SOLUTION INTRAMUSCULAR; INTRAVENOUS
Status: DISPENSED
Start: 2023-06-26 | End: 2023-06-27

## 2023-06-26 RX ORDER — OXYCODONE HYDROCHLORIDE 5 MG/1
5 TABLET ORAL PRN
Status: DISCONTINUED | OUTPATIENT
Start: 2023-06-26 | End: 2023-06-26 | Stop reason: HOSPADM

## 2023-06-26 RX ORDER — SODIUM CHLORIDE 9 MG/ML
INJECTION, SOLUTION INTRAVENOUS PRN
Status: DISCONTINUED | OUTPATIENT
Start: 2023-06-26 | End: 2023-06-26 | Stop reason: SDUPTHER

## 2023-06-26 RX ORDER — SODIUM CHLORIDE, SODIUM LACTATE, POTASSIUM CHLORIDE, CALCIUM CHLORIDE 600; 310; 30; 20 MG/100ML; MG/100ML; MG/100ML; MG/100ML
INJECTION, SOLUTION INTRAVENOUS CONTINUOUS
Status: DISCONTINUED | OUTPATIENT
Start: 2023-06-26 | End: 2023-06-26 | Stop reason: SDUPTHER

## 2023-06-26 RX ORDER — LIDOCAINE HYDROCHLORIDE 20 MG/ML
JELLY TOPICAL PRN
Status: DISCONTINUED | OUTPATIENT
Start: 2023-06-26 | End: 2023-06-26 | Stop reason: ALTCHOICE

## 2023-06-26 RX ORDER — SODIUM CHLORIDE 0.9 % (FLUSH) 0.9 %
5-40 SYRINGE (ML) INJECTION PRN
Status: DISCONTINUED | OUTPATIENT
Start: 2023-06-26 | End: 2023-06-26 | Stop reason: HOSPADM

## 2023-06-26 RX ORDER — OXYCODONE HYDROCHLORIDE 5 MG/1
10 TABLET ORAL PRN
Status: DISCONTINUED | OUTPATIENT
Start: 2023-06-26 | End: 2023-06-26 | Stop reason: HOSPADM

## 2023-06-26 RX ADMIN — HYDROMORPHONE HYDROCHLORIDE 0.5 MG: 1 INJECTION, SOLUTION INTRAMUSCULAR; INTRAVENOUS; SUBCUTANEOUS at 15:33

## 2023-06-26 RX ADMIN — Medication 10 ML: at 09:04

## 2023-06-26 RX ADMIN — FAMOTIDINE 20 MG: 10 INJECTION INTRAVENOUS at 14:03

## 2023-06-26 RX ADMIN — TAMSULOSIN HYDROCHLORIDE 0.8 MG: 0.4 CAPSULE ORAL at 20:12

## 2023-06-26 RX ADMIN — CEFAZOLIN 2000 MG: 2 INJECTION, POWDER, FOR SOLUTION INTRAMUSCULAR; INTRAVENOUS at 14:54

## 2023-06-26 RX ADMIN — CEFAZOLIN 2000 MG: 2 INJECTION, POWDER, FOR SOLUTION INTRAMUSCULAR; INTRAVENOUS at 14:40

## 2023-06-26 RX ADMIN — ACETAMINOPHEN 650 MG: 325 TABLET ORAL at 20:12

## 2023-06-26 RX ADMIN — PROPOFOL 40 MG: 10 INJECTION, EMULSION INTRAVENOUS at 14:53

## 2023-06-26 RX ADMIN — PROPOFOL 100 MCG/KG/MIN: 10 INJECTION, EMULSION INTRAVENOUS at 14:54

## 2023-06-26 RX ADMIN — Medication 10 ML: at 20:14

## 2023-06-26 RX ADMIN — CARVEDILOL 6.25 MG: 6.25 TABLET, FILM COATED ORAL at 11:19

## 2023-06-26 RX ADMIN — SODIUM CHLORIDE, POTASSIUM CHLORIDE, SODIUM LACTATE AND CALCIUM CHLORIDE: 600; 310; 30; 20 INJECTION, SOLUTION INTRAVENOUS at 14:47

## 2023-06-26 ASSESSMENT — PAIN SCALES - GENERAL
PAINLEVEL_OUTOF10: 6
PAINLEVEL_OUTOF10: 8
PAINLEVEL_OUTOF10: 0
PAINLEVEL_OUTOF10: 4
PAINLEVEL_OUTOF10: 4

## 2023-06-26 ASSESSMENT — PAIN DESCRIPTION - DESCRIPTORS
DESCRIPTORS: BURNING
DESCRIPTORS: BURNING

## 2023-06-26 ASSESSMENT — ENCOUNTER SYMPTOMS: SHORTNESS OF BREATH: 0

## 2023-06-26 ASSESSMENT — PAIN - FUNCTIONAL ASSESSMENT: PAIN_FUNCTIONAL_ASSESSMENT: ACTIVITIES ARE NOT PREVENTED

## 2023-06-26 ASSESSMENT — PAIN DESCRIPTION - LOCATION
LOCATION: PENIS
LOCATION: PENIS

## 2023-06-26 ASSESSMENT — PAIN DESCRIPTION - FREQUENCY: FREQUENCY: INTERMITTENT

## 2023-06-26 ASSESSMENT — PAIN DESCRIPTION - ONSET: ONSET: ON-GOING

## 2023-06-26 ASSESSMENT — PAIN DESCRIPTION - PAIN TYPE: TYPE: ACUTE PAIN

## 2023-06-26 ASSESSMENT — PAIN DESCRIPTION - ORIENTATION: ORIENTATION: ANTERIOR

## 2023-06-26 ASSESSMENT — LIFESTYLE VARIABLES: SMOKING_STATUS: 0

## 2023-06-27 VITALS
HEART RATE: 71 BPM | BODY MASS INDEX: 35.4 KG/M2 | RESPIRATION RATE: 18 BRPM | OXYGEN SATURATION: 97 % | SYSTOLIC BLOOD PRESSURE: 126 MMHG | HEIGHT: 73 IN | TEMPERATURE: 97.8 F | DIASTOLIC BLOOD PRESSURE: 81 MMHG | WEIGHT: 267.13 LBS

## 2023-06-27 LAB
ANION GAP SERPL CALCULATED.3IONS-SCNC: 13 MMOL/L (ref 3–16)
BASOPHILS # BLD: 0.1 K/UL (ref 0–0.2)
BASOPHILS NFR BLD: 1.1 %
BUN SERPL-MCNC: 34 MG/DL (ref 7–20)
CALCIUM SERPL-MCNC: 8.7 MG/DL (ref 8.3–10.6)
CHLORIDE SERPL-SCNC: 103 MMOL/L (ref 99–110)
CO2 SERPL-SCNC: 21 MMOL/L (ref 21–32)
CREAT SERPL-MCNC: 1.4 MG/DL (ref 0.8–1.3)
DEPRECATED RDW RBC AUTO: 14.2 % (ref 12.4–15.4)
EOSINOPHIL # BLD: 0.2 K/UL (ref 0–0.6)
EOSINOPHIL NFR BLD: 3.5 %
GFR SERPLBLD CREATININE-BSD FMLA CKD-EPI: 52 ML/MIN/{1.73_M2}
GLUCOSE BLD-MCNC: 203 MG/DL (ref 70–99)
GLUCOSE BLD-MCNC: 324 MG/DL (ref 70–99)
GLUCOSE SERPL-MCNC: 206 MG/DL (ref 70–99)
HCT VFR BLD AUTO: 38.3 % (ref 40.5–52.5)
HGB BLD-MCNC: 12.8 G/DL (ref 13.5–17.5)
LYMPHOCYTES # BLD: 0.9 K/UL (ref 1–5.1)
LYMPHOCYTES NFR BLD: 14.2 %
MCH RBC QN AUTO: 29.6 PG (ref 26–34)
MCHC RBC AUTO-ENTMCNC: 33.5 G/DL (ref 31–36)
MCV RBC AUTO: 88.3 FL (ref 80–100)
MONOCYTES # BLD: 0.6 K/UL (ref 0–1.3)
MONOCYTES NFR BLD: 8.9 %
NEUTROPHILS # BLD: 4.8 K/UL (ref 1.7–7.7)
NEUTROPHILS NFR BLD: 72.3 %
PERFORMED ON: ABNORMAL
PERFORMED ON: ABNORMAL
PLATELET # BLD AUTO: 152 K/UL (ref 135–450)
PMV BLD AUTO: 10.4 FL (ref 5–10.5)
POTASSIUM SERPL-SCNC: 4.6 MMOL/L (ref 3.5–5.1)
RBC # BLD AUTO: 4.33 M/UL (ref 4.2–5.9)
SODIUM SERPL-SCNC: 137 MMOL/L (ref 136–145)
WBC # BLD AUTO: 6.7 K/UL (ref 4–11)

## 2023-06-27 PROCEDURE — 85025 COMPLETE CBC W/AUTO DIFF WBC: CPT

## 2023-06-27 PROCEDURE — 80048 BASIC METABOLIC PNL TOTAL CA: CPT

## 2023-06-27 PROCEDURE — 99238 HOSP IP/OBS DSCHRG MGMT 30/<: CPT | Performed by: INTERNAL MEDICINE

## 2023-06-27 PROCEDURE — 6370000000 HC RX 637 (ALT 250 FOR IP): Performed by: UROLOGY

## 2023-06-27 PROCEDURE — 36415 COLL VENOUS BLD VENIPUNCTURE: CPT

## 2023-06-27 RX ADMIN — ATORVASTATIN CALCIUM 40 MG: 40 TABLET, FILM COATED ORAL at 09:00

## 2023-06-27 RX ADMIN — ISOSORBIDE MONONITRATE 30 MG: 30 TABLET, EXTENDED RELEASE ORAL at 09:00

## 2023-06-27 RX ADMIN — INSULIN LISPRO 3 UNITS: 100 INJECTION, SOLUTION INTRAVENOUS; SUBCUTANEOUS at 12:58

## 2023-06-27 RX ADMIN — POLYETHYLENE GLYCOL 3350 17 G: 17 POWDER, FOR SOLUTION ORAL at 06:49

## 2023-06-27 RX ADMIN — CARVEDILOL 6.25 MG: 6.25 TABLET, FILM COATED ORAL at 09:00

## 2023-06-29 ENCOUNTER — FOLLOWUP TELEPHONE ENCOUNTER (OUTPATIENT)
Dept: ADMINISTRATIVE | Age: 78
End: 2023-06-29

## 2023-07-06 ENCOUNTER — HOSPITAL ENCOUNTER (OUTPATIENT)
Dept: WOUND CARE | Age: 78
Discharge: HOME | End: 2023-07-06

## 2023-07-06 DIAGNOSIS — N28.1 COMPLEX RENAL CYST: ICD-10-CM

## 2023-07-06 DIAGNOSIS — N30.41 IRRADIATION CYSTITIS WITH HEMATURIA: ICD-10-CM

## 2023-07-06 PROBLEM — I50.22 CHRONIC SYSTOLIC CONGESTIVE HEART FAILURE (HCC): Status: RESOLVED | Noted: 2023-02-12 | Resolved: 2023-07-06

## 2023-07-06 PROBLEM — Z79.4 TYPE 2 DIABETES MELLITUS WITH STAGE 3 CHRONIC KIDNEY DISEASE, WITH LONG-TERM CURRENT USE OF INSULIN (HCC): Status: ACTIVE | Noted: 2023-02-08

## 2023-07-06 PROBLEM — N17.9 ACUTE KIDNEY INJURY SUPERIMPOSED ON CKD (HCC): Status: RESOLVED | Noted: 2023-02-15 | Resolved: 2023-07-06

## 2023-07-06 PROBLEM — R31.9 HEMATURIA: Status: RESOLVED | Noted: 2023-06-18 | Resolved: 2023-07-06

## 2023-07-06 PROBLEM — I50.21 ACUTE SYSTOLIC CONGESTIVE HEART FAILURE (HCC): Status: RESOLVED | Noted: 2023-02-08 | Resolved: 2023-07-06

## 2023-07-06 PROBLEM — I25.810 CORONARY ARTERY DISEASE INVOLVING CORONARY BYPASS GRAFT OF NATIVE HEART: Status: ACTIVE | Noted: 2023-02-08

## 2023-07-06 PROBLEM — I25.5 ISCHEMIC CARDIOMYOPATHY: Status: ACTIVE | Noted: 2023-02-13

## 2023-07-06 PROBLEM — E87.8 ELECTROLYTE IMBALANCE: Status: RESOLVED | Noted: 2023-02-12 | Resolved: 2023-07-06

## 2023-07-06 PROBLEM — N18.30 STAGE 3 CHRONIC KIDNEY DISEASE (HCC): Status: RESOLVED | Noted: 2023-02-08 | Resolved: 2023-07-06

## 2023-07-06 PROBLEM — N18.9 ACUTE KIDNEY INJURY SUPERIMPOSED ON CKD (HCC): Status: RESOLVED | Noted: 2023-02-15 | Resolved: 2023-07-06

## 2023-07-06 NOTE — CONSULTS
I received a consult from Dr. Nick Talley regarding HBO therapy for Mr. Chano Gamez' radiation cystitis. Reviewed his chart this AM, and discovered that his EF was only 15-20% by TTE, just a few weeks ago. Unfortunately that really is an absolute contraindication to HBO therapy (as the high oxygen environment tends to cause some pretty diffuse vasoconstriction, increases afterload, carries a significant risk of acute CHF and pulmonary edema while in the chamber). I actually called the patient this AM to discuss this and perhaps avoid him taking a trip all the way here for a formal consult, but he had already left home by the time I phoned, so we (he and his wife and I) just spoke for a few minutes here, when I explained the above. He's due to see Dr. Nick Talley in follow-up this afternoon, and I sent him a quick message about this. Thankfully Mr. Chano Gamez tells me that this week his hematuria seems much improved after his most recent cysto, clot evacuation, fulguration of bleeding sites. If (via revascularization, meds, cardiac rehab, etc) his EF gets up to the 30-35% range at some point, and he's having clinically significant signs and symptoms of radiation cystitis at that time, I'd be more than happy to see him back, discuss the overall process, and try to get him through a course of HBO therapy. Since he has Medicare as his primary insurance, there wouldn't be a formal prior auth process, so we could potentially start treatments very quickly, IF we determine that the cardiac and other risks are reasonably low. I'll update his cardiologist as well.     Electronically signed by Elba Sweeney MD on 7/6/2023 at 10:31 AM

## 2023-07-19 ENCOUNTER — HOSPITAL ENCOUNTER (EMERGENCY)
Age: 78
Discharge: HOME | End: 2023-07-20
Payer: MEDICARE

## 2023-07-19 DIAGNOSIS — R33.9 URINARY RETENTION: ICD-10-CM

## 2023-07-19 DIAGNOSIS — R31.9 HEMATURIA, UNSPECIFIED TYPE: Primary | ICD-10-CM

## 2023-07-19 DIAGNOSIS — N39.0 URINARY TRACT INFECTION WITH HEMATURIA, SITE UNSPECIFIED: ICD-10-CM

## 2023-07-19 DIAGNOSIS — R31.9 URINARY TRACT INFECTION WITH HEMATURIA, SITE UNSPECIFIED: ICD-10-CM

## 2023-07-19 DIAGNOSIS — Z79.01 ANTICOAGULATED: ICD-10-CM

## 2023-07-19 DIAGNOSIS — Z97.8 FOLEY CATHETER IN PLACE: ICD-10-CM

## 2023-07-19 LAB
ALBUMIN SERPL-MCNC: 3.7 G/DL (ref 3.4–5)
ALBUMIN/GLOB SERPL: 1.4 {RATIO} (ref 1.1–2.2)
ALP SERPL-CCNC: 123 U/L (ref 40–129)
ALT SERPL-CCNC: 25 U/L (ref 10–40)
ANION GAP SERPL CALCULATED.3IONS-SCNC: 12 MMOL/L (ref 3–16)
AST SERPL-CCNC: 21 U/L (ref 15–37)
BACTERIA URNS QL MICRO: ABNORMAL /HPF
BASOPHILS # BLD: 0.1 K/UL (ref 0–0.2)
BASOPHILS NFR BLD: 0.9 %
BILIRUB SERPL-MCNC: 0.8 MG/DL (ref 0–1)
BILIRUB UR QL STRIP.AUTO: ABNORMAL
BUN SERPL-MCNC: 42 MG/DL (ref 7–20)
CALCIUM SERPL-MCNC: 9.2 MG/DL (ref 8.3–10.6)
CHLORIDE SERPL-SCNC: 103 MMOL/L (ref 99–110)
CLARITY UR: CLEAR
CO2 SERPL-SCNC: 23 MMOL/L (ref 21–32)
COLOR UR: ABNORMAL
CREAT SERPL-MCNC: 1.3 MG/DL (ref 0.8–1.3)
DEPRECATED RDW RBC AUTO: 15.3 % (ref 12.4–15.4)
EOSINOPHIL # BLD: 0.1 K/UL (ref 0–0.6)
EOSINOPHIL NFR BLD: 1.6 %
EPI CELLS #/AREA URNS HPF: ABNORMAL /HPF (ref 0–5)
GFR SERPLBLD CREATININE-BSD FMLA CKD-EPI: 57 ML/MIN/{1.73_M2}
GLUCOSE SERPL-MCNC: 94 MG/DL (ref 70–99)
GLUCOSE UR STRIP.AUTO-MCNC: NEGATIVE MG/DL
HCT VFR BLD AUTO: 38 % (ref 40.5–52.5)
HGB BLD-MCNC: 13.4 G/DL (ref 13.5–17.5)
HGB UR QL STRIP.AUTO: ABNORMAL
KETONES UR STRIP.AUTO-MCNC: ABNORMAL MG/DL
LEUKOCYTE ESTERASE UR QL STRIP.AUTO: ABNORMAL
LYMPHOCYTES # BLD: 1 K/UL (ref 1–5.1)
LYMPHOCYTES NFR BLD: 13.2 %
MCH RBC QN AUTO: 30.1 PG (ref 26–34)
MCHC RBC AUTO-ENTMCNC: 35.2 G/DL (ref 31–36)
MCV RBC AUTO: 85.4 FL (ref 80–100)
MONOCYTES # BLD: 0.7 K/UL (ref 0–1.3)
MONOCYTES NFR BLD: 8.8 %
NEUTROPHILS # BLD: 5.9 K/UL (ref 1.7–7.7)
NEUTROPHILS NFR BLD: 75.5 %
NITRITE UR QL STRIP.AUTO: POSITIVE
PH UR STRIP.AUTO: 6.5 [PH] (ref 5–8)
PLATELET # BLD AUTO: 170 K/UL (ref 135–450)
PMV BLD AUTO: 10.4 FL (ref 5–10.5)
POTASSIUM SERPL-SCNC: 4.5 MMOL/L (ref 3.5–5.1)
PROT SERPL-MCNC: 6.3 G/DL (ref 6.4–8.2)
PROT UR STRIP.AUTO-MCNC: >=300 MG/DL
RBC # BLD AUTO: 4.45 M/UL (ref 4.2–5.9)
RBC #/AREA URNS HPF: >100 /HPF (ref 0–4)
SODIUM SERPL-SCNC: 138 MMOL/L (ref 136–145)
SP GR UR STRIP.AUTO: 1.01 (ref 1–1.03)
UA COMPLETE W REFLEX CULTURE PNL UR: ABNORMAL
UA DIPSTICK W REFLEX MICRO PNL UR: YES
URN SPEC COLLECT METH UR: ABNORMAL
UROBILINOGEN UR STRIP-ACNC: 1 E.U./DL
WBC # BLD AUTO: 7.8 K/UL (ref 4–11)
WBC #/AREA URNS HPF: ABNORMAL /HPF (ref 0–5)

## 2023-07-19 PROCEDURE — 51702 INSERT TEMP BLADDER CATH: CPT

## 2023-07-19 PROCEDURE — 85025 COMPLETE CBC W/AUTO DIFF WBC: CPT

## 2023-07-19 PROCEDURE — 80053 COMPREHEN METABOLIC PANEL: CPT

## 2023-07-19 PROCEDURE — 6370000000 HC RX 637 (ALT 250 FOR IP): Performed by: NURSE PRACTITIONER

## 2023-07-19 PROCEDURE — 81001 URINALYSIS AUTO W/SCOPE: CPT

## 2023-07-19 PROCEDURE — 99284 EMERGENCY DEPT VISIT MOD MDM: CPT

## 2023-07-19 PROCEDURE — 36415 COLL VENOUS BLD VENIPUNCTURE: CPT

## 2023-07-19 PROCEDURE — 51798 US URINE CAPACITY MEASURE: CPT

## 2023-07-19 RX ORDER — CEFDINIR 300 MG/1
300 CAPSULE ORAL ONCE
Status: COMPLETED | OUTPATIENT
Start: 2023-07-19 | End: 2023-07-20

## 2023-07-19 RX ORDER — CEFDINIR 300 MG/1
300 CAPSULE ORAL 2 TIMES DAILY
Qty: 14 CAPSULE | Refills: 0 | Status: SHIPPED | OUTPATIENT
Start: 2023-07-19 | End: 2023-07-26

## 2023-07-19 RX ORDER — LIDOCAINE HYDROCHLORIDE 20 MG/ML
JELLY TOPICAL PRN
Status: DISCONTINUED | OUTPATIENT
Start: 2023-07-19 | End: 2023-07-20 | Stop reason: HOSPADM

## 2023-07-19 RX ADMIN — LIDOCAINE HYDROCHLORIDE: 20 JELLY TOPICAL at 19:28

## 2023-07-19 ASSESSMENT — ENCOUNTER SYMPTOMS
RHINORRHEA: 0
SORE THROAT: 0
FACIAL SWELLING: 0
COLOR CHANGE: 0
SHORTNESS OF BREATH: 0
ABDOMINAL PAIN: 0

## 2023-07-19 ASSESSMENT — PAIN - FUNCTIONAL ASSESSMENT: PAIN_FUNCTIONAL_ASSESSMENT: 0-10

## 2023-07-19 ASSESSMENT — PAIN SCALES - GENERAL: PAINLEVEL_OUTOF10: 8

## 2023-07-19 NOTE — ED PROVIDER NOTES
[address] [address] ED  EMERGENCY DEPARTMENT ENCOUNTER      I am the Primary Clinician of Record    Note started: 6:14 PM EDT 7/19/23    MARIA C. I have evaluated this patient. Pt Name: Armando Méndez  MRN: [id number]  [address] [facility] 1945  Dateof evaluation: 7/19/2023  Provider: ZION Alcantara - CNP  PCP: ZION Fry NP  ED Attending: No att. providers found      [address]       Chief Complaint   Patient presents with    Hematuria     Patient comes in due to blood in urine. Patient states this is the third time he has had this; was here two weeks ago and had a procedure with Dr. Shaggy Mark. HISTORY OF PRESENTILLNESS   (Location/Symptom, Timing/Onset, Context/Setting, Quality, Duration, Modifying Factors, Severity)  Note limiting factors. Armando Méndez is a 69 y.o. male for hematuria. Onset was today. Context includes pt reports he started with hematuria at 200 this afternoon. Pt is on plavix but did not take it today. Pt has had recent urological procedures. Alleviating factors include nothing. Aggravating factors include nothing. Pain is 8/10. nothing has been used for pain today. Nursing Notes were all reviewed and agreed with or any disagreements were addressed  in the HPI. REVIEW OF SYSTEMS       Review of Systems   Constitutional:  Negative for activity change, appetite change and fever. HENT:  Negative for congestion, facial swelling, rhinorrhea and sore throat. Eyes:  Negative for visual disturbance. Respiratory:  Negative for shortness of breath. Cardiovascular:  Negative for chest pain. Gastrointestinal:  Negative for abdominal pain. Genitourinary:  Positive for hematuria. Negative for difficulty urinating. Musculoskeletal:  Negative for arthralgias and myalgias. Skin:  Negative for color change and rash. Neurological:  Negative for dizziness and light-headedness. Psychiatric/Behavioral:  Negative for agitation. with other professionals - none    Social determinants - none    Records Reviewed - none    History from - patient    Limitations to history- none    Chronic conditions: has a past medical history of Acute systolic congestive heart failure ([address]) (2/8/2023), Prostate cancer ([address]), and Tobacco use. Is this patient to be included in the SEP-1 Core Measure due to severe sepsis or septic shock? No   Exclusion criteria - the patient is NOT to be included for SEP-1 Core Measure due to: Infection is not suspected         The patient tolerated their visit well. I have evaluated this patient. My supervising physician was available for consultation. The patient and / or the family were informed of the results of any tests, a time was given to answer questions, a plan was proposed and they agreed with plan. FINAL IMPRESSION      1. Hematuria, unspecified type    2. Anticoagulated    3. Urinary tract infection with hematuria, site unspecified    4. Urinary retention    5.  Murillo catheter in place          DISPOSITION/PLAN   DISPOSITION Decision To Discharge 07/20/2023 12:26:18 AM      PATIENT REFERRED TO:  ZION Hunter NP    Schedule an appointment as soon as possible for a visit in 2 days  for re-evaluation    [facility] ED  [facility]  [phone]    If symptoms worsen    Crys Kee MD  [address]  [phone]    Schedule an appointment as soon as possible for a visit in 1 week  If symptoms worsen, for re-evaluation      DISCHARGE MEDICATIONS:  Discharge Medication List as of 7/19/2023 11:54 PM        START taking these medications    Details   cefdinir (OMNICEF) 300 MG capsule Take 1 capsule by mouth 2 times daily for 7 days, Disp-14 capsule, R-0Print             DISCONTINUED MEDICATIONS:  Discharge Medication List as of 7/19/2023 11:54 PM                 (Please note that portions of this note were

## 2023-07-20 VITALS
DIASTOLIC BLOOD PRESSURE: 90 MMHG | RESPIRATION RATE: 14 BRPM | TEMPERATURE: 98.8 F | BODY MASS INDEX: 35.27 KG/M2 | OXYGEN SATURATION: 100 % | SYSTOLIC BLOOD PRESSURE: 155 MMHG | HEART RATE: 71 BPM | WEIGHT: 266.1 LBS | HEIGHT: 73 IN

## 2023-07-20 PROCEDURE — 6370000000 HC RX 637 (ALT 250 FOR IP): Performed by: NURSE PRACTITIONER

## 2023-07-20 RX ADMIN — CEFDINIR 300 MG: 300 CAPSULE ORAL at 00:01

## 2023-07-20 ASSESSMENT — PAIN - FUNCTIONAL ASSESSMENT: PAIN_FUNCTIONAL_ASSESSMENT: NONE - DENIES PAIN

## 2023-07-20 NOTE — ED NOTES
Patient discharged via w/c with mccoy cath in place. Education provided to patient and wife regarding mccoy care.       Bharath , AUDIE  07/20/23 0025

## 2023-07-20 NOTE — ED NOTES
Dr. Alejandra Carrillo returned call and spoke with Chuckie Zeng CNP at 2110.      Kacie Ryan  07/19/23 2112

## 2023-07-31 ENCOUNTER — OFFICE VISIT (OUTPATIENT)
Dept: CARDIOLOGY CLINIC | Age: 78
End: 2023-07-31
Payer: MEDICARE

## 2023-07-31 VITALS
HEIGHT: 73 IN | DIASTOLIC BLOOD PRESSURE: 90 MMHG | HEART RATE: 55 BPM | WEIGHT: 263.6 LBS | SYSTOLIC BLOOD PRESSURE: 150 MMHG | BODY MASS INDEX: 34.94 KG/M2 | OXYGEN SATURATION: 98 %

## 2023-07-31 DIAGNOSIS — I25.5 ISCHEMIC CARDIOMYOPATHY: ICD-10-CM

## 2023-07-31 DIAGNOSIS — I10 PRIMARY HYPERTENSION: ICD-10-CM

## 2023-07-31 DIAGNOSIS — I50.20 HFREF (HEART FAILURE WITH REDUCED EJECTION FRACTION) (HCC): Primary | ICD-10-CM

## 2023-07-31 PROCEDURE — G8427 DOCREV CUR MEDS BY ELIG CLIN: HCPCS | Performed by: NURSE PRACTITIONER

## 2023-07-31 PROCEDURE — 1123F ACP DISCUSS/DSCN MKR DOCD: CPT | Performed by: NURSE PRACTITIONER

## 2023-07-31 PROCEDURE — 3080F DIAST BP >= 90 MM HG: CPT | Performed by: NURSE PRACTITIONER

## 2023-07-31 PROCEDURE — 1036F TOBACCO NON-USER: CPT | Performed by: NURSE PRACTITIONER

## 2023-07-31 PROCEDURE — G8417 CALC BMI ABV UP PARAM F/U: HCPCS | Performed by: NURSE PRACTITIONER

## 2023-07-31 PROCEDURE — 3077F SYST BP >= 140 MM HG: CPT | Performed by: NURSE PRACTITIONER

## 2023-07-31 PROCEDURE — 99214 OFFICE O/P EST MOD 30 MIN: CPT | Performed by: NURSE PRACTITIONER

## 2023-07-31 RX ORDER — SPIRONOLACTONE 25 MG/1
12.5 TABLET ORAL DAILY
Qty: 30 TABLET | Refills: 3 | Status: SHIPPED | OUTPATIENT
Start: 2023-07-31

## 2023-07-31 RX ORDER — ASPIRIN 81 MG/1
81 TABLET ORAL DAILY
Qty: 90 TABLET | Refills: 3 | Status: SHIPPED | OUTPATIENT
Start: 2023-07-31

## 2023-07-31 ASSESSMENT — ENCOUNTER SYMPTOMS: SHORTNESS OF BREATH: 1

## 2023-07-31 NOTE — PROGRESS NOTES
[facility]   Cardiology Note              Date:  July 31, 2023  Patientname: Alejandro De La Paz  YOB: 1945    Primary Care physician: ZION Osorio NP    HISTORY OF PRESENT ILLNESS: Alejandro De La Paz is a 69 y.o. male with a history of CAD, ischemic cardiomyopathy, CHF, HTN, HLD, CKD, DM. He had CABG early 1990s at [facility], stopped following with cardiology after his cardiologist retired. He was admitted 2/8/2023 for SOB and AMS. Found to have CHF and A/CKD. Echo showed EF 25-30%. [facility] 2/17/2023 showed severe native CAD, patent LIMA-LAD, occluded SVG-RCA, SVG-OM1 lesion, staged PCI due to renal function. On 2/20/2023 he had YUN SVG-OM. At office appointment 3/21/2023, low dose entresto started for CHF and cardiomyopathy however stopped due to hyperkalemia. Echo 6/2023 showed EF 15-20%. He was admitted 6/2023 for hematuria requiring clot evacuation and fulguration of bleeding points. Sravani Starcher was restarted while admitted with close renal monitoring. Today he presents for hospital follow up for HFrEF. He is feeling better, no further hematuria, catheter was removed 2 days ago. He has dyspnea with more strenuous exertion, stable over the last 6 months. He has no chest pain, palpitations, dizziness, syncope, edema. He is participating in cardiac rehab and tolerating well. He walks with a cane. He recently saw his nephrologist at [facility], no changes made. NYHA class III    Office weight today 7/31/2023: 263 lbs  Office weight 4/11/2023: 272 lbs  Hospital discharge weight 2/22/2023: 283 lbs    Cardiologist: Dr. Pearl Wan 5/2023    Past Medical History:   has a past medical history of Acute systolic congestive heart failure ([address]), Prostate cancer ([address]), and Tobacco use. Past Surgical History:   has a past surgical history that includes Cystoscopy (N/A, 06/20/2023); Coronary angioplasty with stent (02/20/2023); Cystoscopy (N/A, 06/26/2023);  Coronary artery bypass graft; and Cardiac

## 2023-07-31 NOTE — PATIENT INSTRUCTIONS
Start spironolactone 12.5 mg daily for heart strength (1/2 pill)  Blood work in 1-2 weeks  Continue other medications  Check BP at home and call the office if consistently out of goal range; goal <130s/80s  Follow up in 1 month

## 2023-08-14 ENCOUNTER — HOSPITAL ENCOUNTER (OUTPATIENT)
Age: 78
Discharge: HOME | End: 2023-08-14
Payer: MEDICARE

## 2023-08-14 DIAGNOSIS — I50.20 HFREF (HEART FAILURE WITH REDUCED EJECTION FRACTION) (HCC): ICD-10-CM

## 2023-08-14 LAB
ANION GAP SERPL CALCULATED.3IONS-SCNC: 11 MMOL/L (ref 3–16)
BUN SERPL-MCNC: 58 MG/DL (ref 7–20)
CALCIUM SERPL-MCNC: 9.1 MG/DL (ref 8.3–10.6)
CHLORIDE SERPL-SCNC: 107 MMOL/L (ref 99–110)
CO2 SERPL-SCNC: 23 MMOL/L (ref 21–32)
CREAT SERPL-MCNC: 1.7 MG/DL (ref 0.8–1.3)
GFR SERPLBLD CREATININE-BSD FMLA CKD-EPI: 41 ML/MIN/{1.73_M2}
GLUCOSE SERPL-MCNC: 83 MG/DL (ref 70–99)
POTASSIUM SERPL-SCNC: 5 MMOL/L (ref 3.5–5.1)
SODIUM SERPL-SCNC: 141 MMOL/L (ref 136–145)

## 2023-08-14 PROCEDURE — 80048 BASIC METABOLIC PNL TOTAL CA: CPT

## 2023-08-14 PROCEDURE — 36415 COLL VENOUS BLD VENIPUNCTURE: CPT

## 2023-08-15 ENCOUNTER — TELEPHONE (OUTPATIENT)
Dept: CARDIOLOGY CLINIC | Age: 78
End: 2023-08-15

## 2023-08-15 DIAGNOSIS — Z79.899 MEDICATION MANAGEMENT: Primary | ICD-10-CM

## 2023-08-15 DIAGNOSIS — I25.5 ISCHEMIC CARDIOMYOPATHY: ICD-10-CM

## 2023-08-15 DIAGNOSIS — I25.810 CORONARY ARTERY DISEASE INVOLVING CORONARY BYPASS GRAFT OF NATIVE HEART, UNSPECIFIED WHETHER ANGINA PRESENT: ICD-10-CM

## 2023-08-15 NOTE — TELEPHONE ENCOUNTER
Attempted to call pt, no answer. LMOVM for pt to return call back. Routed lab to Dr. Sonia Landa per NPLR's wishes. Repeat BMP ordered.

## 2023-08-15 NOTE — TELEPHONE ENCOUNTER
----- Message from ZION Jaramillo CNP sent at 8/14/2023  4:39 PM EDT -----  Please let him know that kidney function is mildly worse than last check but overall stable compared to previous. Would like to keep heart medications the same and he should limit high potassium foods. He should continue to follow with his nephrologist and please make sure labs are sent to him to review as well. Please have him repeat BMP in 2 week to ensure stable. follow up as planned.

## 2023-08-28 ENCOUNTER — HOSPITAL ENCOUNTER (OUTPATIENT)
Age: 78
Discharge: HOME | End: 2023-08-28
Payer: MEDICARE

## 2023-08-28 DIAGNOSIS — I25.810 CORONARY ARTERY DISEASE INVOLVING CORONARY BYPASS GRAFT OF NATIVE HEART, UNSPECIFIED WHETHER ANGINA PRESENT: ICD-10-CM

## 2023-08-28 DIAGNOSIS — I25.5 ISCHEMIC CARDIOMYOPATHY: ICD-10-CM

## 2023-08-28 LAB
ANION GAP SERPL CALCULATED.3IONS-SCNC: 10 MMOL/L (ref 3–16)
BUN SERPL-MCNC: 56 MG/DL (ref 7–20)
CALCIUM SERPL-MCNC: 9.2 MG/DL (ref 8.3–10.6)
CHLORIDE SERPL-SCNC: 106 MMOL/L (ref 99–110)
CO2 SERPL-SCNC: 24 MMOL/L (ref 21–32)
CREAT SERPL-MCNC: 1.5 MG/DL (ref 0.8–1.3)
GFR SERPLBLD CREATININE-BSD FMLA CKD-EPI: 48 ML/MIN/{1.73_M2}
GLUCOSE SERPL-MCNC: 181 MG/DL (ref 70–99)
POTASSIUM SERPL-SCNC: 4.8 MMOL/L (ref 3.5–5.1)
SODIUM SERPL-SCNC: 140 MMOL/L (ref 136–145)

## 2023-08-28 PROCEDURE — 80048 BASIC METABOLIC PNL TOTAL CA: CPT

## 2023-08-28 PROCEDURE — 36415 COLL VENOUS BLD VENIPUNCTURE: CPT

## 2023-08-29 ENCOUNTER — TELEPHONE (OUTPATIENT)
Dept: CARDIOLOGY CLINIC | Age: 78
End: 2023-08-29

## 2023-08-29 NOTE — TELEPHONE ENCOUNTER
----- Message from ZION Grady CNP sent at 8/29/2023  8:30 AM EDT -----  Please let him know that kidney function slightly improved from prior check. No changes and follow up as planned. Thanks!

## 2023-09-05 ENCOUNTER — TELEPHONE (OUTPATIENT)
Dept: CARDIOLOGY | Age: 78
End: 2023-09-05

## 2023-09-05 ENCOUNTER — OFFICE VISIT (OUTPATIENT)
Dept: CARDIOLOGY CLINIC | Age: 78
End: 2023-09-05
Payer: MEDICARE

## 2023-09-05 VITALS
HEIGHT: 73 IN | BODY MASS INDEX: 34.17 KG/M2 | HEART RATE: 63 BPM | DIASTOLIC BLOOD PRESSURE: 60 MMHG | WEIGHT: 257.8 LBS | SYSTOLIC BLOOD PRESSURE: 120 MMHG | OXYGEN SATURATION: 98 %

## 2023-09-05 DIAGNOSIS — I25.5 ISCHEMIC CARDIOMYOPATHY: ICD-10-CM

## 2023-09-05 DIAGNOSIS — I50.20 HFREF (HEART FAILURE WITH REDUCED EJECTION FRACTION) (HCC): Primary | ICD-10-CM

## 2023-09-05 PROCEDURE — 3078F DIAST BP <80 MM HG: CPT | Performed by: NURSE PRACTITIONER

## 2023-09-05 PROCEDURE — 3074F SYST BP LT 130 MM HG: CPT | Performed by: NURSE PRACTITIONER

## 2023-09-05 PROCEDURE — 1036F TOBACCO NON-USER: CPT | Performed by: NURSE PRACTITIONER

## 2023-09-05 PROCEDURE — 1123F ACP DISCUSS/DSCN MKR DOCD: CPT | Performed by: NURSE PRACTITIONER

## 2023-09-05 PROCEDURE — 99214 OFFICE O/P EST MOD 30 MIN: CPT | Performed by: NURSE PRACTITIONER

## 2023-09-05 PROCEDURE — G8417 CALC BMI ABV UP PARAM F/U: HCPCS | Performed by: NURSE PRACTITIONER

## 2023-09-05 PROCEDURE — G8427 DOCREV CUR MEDS BY ELIG CLIN: HCPCS | Performed by: NURSE PRACTITIONER

## 2023-09-05 RX ORDER — CARVEDILOL 6.25 MG/1
6.25 TABLET ORAL 2 TIMES DAILY WITH MEALS
Qty: 180 TABLET | Refills: 3 | Status: SHIPPED | OUTPATIENT
Start: 2023-09-05

## 2023-09-05 ASSESSMENT — ENCOUNTER SYMPTOMS: SHORTNESS OF BREATH: 1

## 2023-09-05 NOTE — TELEPHONE ENCOUNTER
Please relay a message to PCP office that jardiance 10 mg daily was added today for HFrEF. Want to make PCP aware in case they felt insulin dose should be adjusted. Thanks!

## 2023-09-05 NOTE — PROGRESS NOTES
controlled  HLD: controlled, LDL 49, statin  DM: hgb a1c 7.1  CKD: follows with nephrologist at [facility], Dr. Rasta Justice  Hyperkalemia: now stable    Plan:   1. He has been out of entresto for 2 weeks due to cost; he has been referred for patient assistance, samples provided while forms pending; if ultimately unable to obtain for reasonable cost, will change to ACE or ARB  2. Will send in PA for jardiance   3. BMP in 1-2 weeks  4. Continue aspirin, statin, carvedilol, plavix, lasix, imdur  5. Optimize GDMT and reassess EF in follow up, likely MUGA, if EF remains <35%, consider ICD; had been planning for 10/2023 but since entresto therapy was interrupted, likely 11/2023  6. Check BP at home and call the office if consistently out of goal range  7. Continue cardiac rehab  8. Follow up with PCP and nephrology  9.  Follow up with me in 1 month for GDMT titration    Toma Abdullahi, ZION-CNP  [address] [facility]  [phone]

## 2023-09-06 NOTE — TELEPHONE ENCOUNTER
Called pt PCP office, spoke with [facility], informed her of NPLR message.  [facility] states she will get the message sent to pcp

## 2023-09-12 ENCOUNTER — HOSPITAL ENCOUNTER (OUTPATIENT)
Age: 78
Discharge: HOME | End: 2023-09-12
Payer: MEDICARE

## 2023-09-12 DIAGNOSIS — I50.20 HFREF (HEART FAILURE WITH REDUCED EJECTION FRACTION) (HCC): ICD-10-CM

## 2023-09-12 LAB
ANION GAP SERPL CALCULATED.3IONS-SCNC: 9 MMOL/L (ref 3–16)
BUN SERPL-MCNC: 58 MG/DL (ref 7–20)
CALCIUM SERPL-MCNC: 9 MG/DL (ref 8.3–10.6)
CHLORIDE SERPL-SCNC: 105 MMOL/L (ref 99–110)
CO2 SERPL-SCNC: 24 MMOL/L (ref 21–32)
CREAT SERPL-MCNC: 1.9 MG/DL (ref 0.8–1.3)
GFR SERPLBLD CREATININE-BSD FMLA CKD-EPI: 36 ML/MIN/{1.73_M2}
GLUCOSE SERPL-MCNC: 152 MG/DL (ref 70–99)
POTASSIUM SERPL-SCNC: 5.6 MMOL/L (ref 3.5–5.1)
SODIUM SERPL-SCNC: 138 MMOL/L (ref 136–145)

## 2023-09-12 PROCEDURE — 36415 COLL VENOUS BLD VENIPUNCTURE: CPT

## 2023-09-12 PROCEDURE — 80048 BASIC METABOLIC PNL TOTAL CA: CPT

## 2023-09-13 ENCOUNTER — TELEPHONE (OUTPATIENT)
Dept: CARDIOLOGY CLINIC | Age: 78
End: 2023-09-13

## 2023-09-13 DIAGNOSIS — I10 PRIMARY HYPERTENSION: Primary | ICD-10-CM

## 2023-09-13 DIAGNOSIS — E78.2 MIXED HYPERLIPIDEMIA: ICD-10-CM

## 2023-09-13 DIAGNOSIS — I25.5 ISCHEMIC CARDIOMYOPATHY: ICD-10-CM

## 2023-09-13 NOTE — TELEPHONE ENCOUNTER
Since potassium and kidney function is worse, he should hold spironolactone. Recheck BMP in 1-2 weeks.  Thank you

## 2023-09-13 NOTE — TELEPHONE ENCOUNTER
----- Message from ZION March CNP sent at 9/13/2023 11:40 AM EDT -----  Please let him know that potassium is increased and renal function a little worse than prior. Please obtain update on weights/symptoms/medication changes. Thank you!

## 2023-09-13 NOTE — TELEPHONE ENCOUNTER
Spoke to pt, relayed NPLR message. Pt v/u and stated he has no symptoms, no weight gain, and no changes in medications.

## 2023-09-14 NOTE — TELEPHONE ENCOUNTER
Herson with pt, relayed message from [facility]. Pt v/u     Lab orders placed in [facility].

## 2023-09-23 ENCOUNTER — HOSPITAL ENCOUNTER (OUTPATIENT)
Age: 78
Discharge: HOME | End: 2023-09-23
Payer: MEDICARE

## 2023-09-23 DIAGNOSIS — E78.2 MIXED HYPERLIPIDEMIA: ICD-10-CM

## 2023-09-23 DIAGNOSIS — I25.5 ISCHEMIC CARDIOMYOPATHY: ICD-10-CM

## 2023-09-23 DIAGNOSIS — I10 PRIMARY HYPERTENSION: ICD-10-CM

## 2023-09-23 LAB
ANION GAP SERPL CALCULATED.3IONS-SCNC: 9 MMOL/L (ref 3–16)
BUN SERPL-MCNC: 55 MG/DL (ref 7–20)
CALCIUM SERPL-MCNC: 9 MG/DL (ref 8.3–10.6)
CHLORIDE SERPL-SCNC: 107 MMOL/L (ref 99–110)
CO2 SERPL-SCNC: 24 MMOL/L (ref 21–32)
CREAT SERPL-MCNC: 1.8 MG/DL (ref 0.8–1.3)
GFR SERPLBLD CREATININE-BSD FMLA CKD-EPI: 38 ML/MIN/{1.73_M2}
GLUCOSE SERPL-MCNC: 151 MG/DL (ref 70–99)
POTASSIUM SERPL-SCNC: 5.1 MMOL/L (ref 3.5–5.1)
SODIUM SERPL-SCNC: 140 MMOL/L (ref 136–145)

## 2023-09-23 PROCEDURE — 80048 BASIC METABOLIC PNL TOTAL CA: CPT

## 2023-09-23 PROCEDURE — 36415 COLL VENOUS BLD VENIPUNCTURE: CPT

## 2023-09-25 ENCOUNTER — TELEPHONE (OUTPATIENT)
Dept: CARDIOLOGY CLINIC | Age: 78
End: 2023-09-25

## 2023-09-25 NOTE — TELEPHONE ENCOUNTER
----- Message from ZION Grady CNP sent at 9/25/2023 10:08 AM EDT -----  Please let him know that potassium is improved, renal function overall stable. No changes, continue to follow with nephrology.   Follow-up as planned

## 2023-10-05 ENCOUNTER — OFFICE VISIT (OUTPATIENT)
Dept: CARDIOLOGY CLINIC | Age: 78
End: 2023-10-05
Payer: MEDICARE

## 2023-10-05 VITALS
OXYGEN SATURATION: 98 % | DIASTOLIC BLOOD PRESSURE: 62 MMHG | WEIGHT: 259.2 LBS | HEART RATE: 61 BPM | BODY MASS INDEX: 34.35 KG/M2 | SYSTOLIC BLOOD PRESSURE: 140 MMHG | HEIGHT: 73 IN

## 2023-10-05 DIAGNOSIS — I25.5 ISCHEMIC CARDIOMYOPATHY: Primary | ICD-10-CM

## 2023-10-05 DIAGNOSIS — I50.20 HFREF (HEART FAILURE WITH REDUCED EJECTION FRACTION) (HCC): ICD-10-CM

## 2023-10-05 PROCEDURE — 3077F SYST BP >= 140 MM HG: CPT | Performed by: NURSE PRACTITIONER

## 2023-10-05 PROCEDURE — G8427 DOCREV CUR MEDS BY ELIG CLIN: HCPCS | Performed by: NURSE PRACTITIONER

## 2023-10-05 PROCEDURE — G8417 CALC BMI ABV UP PARAM F/U: HCPCS | Performed by: NURSE PRACTITIONER

## 2023-10-05 PROCEDURE — 3078F DIAST BP <80 MM HG: CPT | Performed by: NURSE PRACTITIONER

## 2023-10-05 PROCEDURE — 1123F ACP DISCUSS/DSCN MKR DOCD: CPT | Performed by: NURSE PRACTITIONER

## 2023-10-05 PROCEDURE — 1036F TOBACCO NON-USER: CPT | Performed by: NURSE PRACTITIONER

## 2023-10-05 PROCEDURE — G8484 FLU IMMUNIZE NO ADMIN: HCPCS | Performed by: NURSE PRACTITIONER

## 2023-10-05 PROCEDURE — 99214 OFFICE O/P EST MOD 30 MIN: CPT | Performed by: NURSE PRACTITIONER

## 2023-10-05 ASSESSMENT — ENCOUNTER SYMPTOMS: SHORTNESS OF BREATH: 1

## 2023-10-05 NOTE — PROGRESS NOTES
[address] [facility]   Cardiology Note              Date:  October 5, 2023  Patientname: Luli Blanco  YOB: 1945    Primary Care physician: ZION Hall NP    HISTORY OF PRESENT ILLNESS: Luli Blanco is a 69 y.o. male with a history of CAD, ischemic cardiomyopathy, CHF, HTN, HLD, CKD, DM. He had CABG early 1990s at [facility], stopped following with cardiology after his cardiologist retired. He was admitted 2/8/2023 for SOB and AMS. Found to have CHF and A/CKD. Echo showed EF 25-30%. [facility] 2/17/2023 showed severe native CAD, patent LIMA-LAD, occluded SVG-RCA, SVG-OM1 lesion, staged PCI due to renal function. On 2/20/2023 he had YUN SVG-OM. At office appointment 3/21/2023, low dose entresto started for CHF and cardiomyopathy however stopped due to hyperkalemia. Echo 6/2023 showed EF 15-20%. He was admitted 6/2023 for hematuria requiring clot evacuation and fulguration of bleeding points. Duran Booty was restarted while admitted with close renal monitoring. In 8/2023, he was off entresto due to cost, restarted and referred for patient assistance. Spironolactone was stopped due to hyperkalemia. Today he presents for follow up for HFrEF. He feels much better since restarting Entresto. He is able to walk more and was even able to split firewood for short period of time. He has no chest pain, palpitations, dizziness, syncope, edema. He has shortness of breath with exertion. NYHA class III    Office weight today 10/5/2023: 259 lbs  Office weight 7/31/2023: 263 lbs    Cardiologist: Dr. Mejia 5/2023    Past Medical History:   has a past medical history of Acute systolic congestive heart failure ([address]), Prostate cancer ([address]), and Tobacco use. Past Surgical History:   has a past surgical history that includes Cystoscopy (N/A, 06/20/2023); Coronary angioplasty with stent (02/20/2023); Cystoscopy (N/A, 06/26/2023); Coronary artery bypass graft; and Cardiac catheterization (02/17/2023).

## 2023-10-05 NOTE — PATIENT INSTRUCTIONS
Will investigate entresto options  Continue other medications  Heart scan in 11/2023, call [phone] to schedule  Follow up with Dr. Irvin Piña

## 2023-11-30 ENCOUNTER — HOSPITAL ENCOUNTER (OUTPATIENT)
Dept: NUCLEAR MEDICINE | Age: 78
Discharge: HOME | End: 2023-11-30
Payer: MEDICARE

## 2023-11-30 DIAGNOSIS — I25.5 ISCHEMIC CARDIOMYOPATHY: ICD-10-CM

## 2023-11-30 PROCEDURE — 3430000000 HC RX DIAGNOSTIC RADIOPHARMACEUTICAL: Performed by: NURSE PRACTITIONER

## 2023-11-30 PROCEDURE — 78472 GATED HEART PLANAR SINGLE: CPT

## 2023-11-30 PROCEDURE — A9560 TC99M LABELED RBC: HCPCS | Performed by: NURSE PRACTITIONER

## 2023-11-30 RX ADMIN — Medication 20 MILLICURIE: at 11:17

## 2023-12-01 ENCOUNTER — TELEPHONE (OUTPATIENT)
Dept: CARDIOLOGY CLINIC | Age: 78
End: 2023-12-01

## 2023-12-01 NOTE — TELEPHONE ENCOUNTER
----- Message from ZION Chavez CNP sent at 11/30/2023  5:25 PM EST -----  Please let him know that heart function has improved slightly since prior study, was 15-20% in 6/2023 and MUGA scan today shows EF is 23%. However heart function is still significantly weak and I would like for him to see EP to discuss ICD. Please make new patient appointment for EP. Keep follow-up with Dr. Sarika Clark.   Thank you

## 2023-12-01 NOTE — TELEPHONE ENCOUNTER
----- Message from ZION Martinez CNP sent at 11/30/2023  5:25 PM EST -----  Please let him know that heart function has improved slightly since prior study, was 15-20% in 6/2023 and MUGA scan today shows EF is 23%. However heart function is still significantly weak and I would like for him to see EP to discuss ICD. Please make new patient appointment for EP. Keep follow-up with Dr. Tiffani Smith.   Thank you

## 2023-12-05 NOTE — PROGRESS NOTES
[facility] Office Note  12/11/2023     Subjective:  Mr. Dev Quinonez follows with my partner Beckie Lanes NP and is here to establish care for CAD, ischemic cardiomyopathy, chronic systolic CHF, HTN, HLD, CKD 3b, DM. C/o SOB    HPI:   He had CABG early 1990s at [facility], stopped following with cardiology after his cardiologist retired. He was admitted 2/8/2023 for SOB and AMS. Found to have CHF and A/CKD. Echo showed EF 25-30%. [facility] 2/17/2023 showed severe native CAD, patent LIMA-LAD, occluded SVG-RCA, SVG-OM1 lesion, staged PCI due to renal function. On 2/20/2023 he had YUN SVG-OM. At office appointment 3/21/2023, low dose entresto started for CHF and cardiomyopathy however stopped due to hyperkalemia. At office appointment with Beckie Lanes, CNP 3/21/2023, low dose entresto started for CHF and cardiomyopathy however stopped due to hyperkalemia. Echo 6/2023 showed EF 15-20%, aortic root dilated 4.2 cm, and severe hypokinesis. He was admitted 6/2023 for hematuria requiring clot evacuation and fulguration of bleeding points. Perkins Kail was restarted while admitted with close renal monitoring. In 8/2023. Referred for patient assistance. Spironolactone was stopped due to hyperkalemia. But he has a handwritten list in his wallet that he is taking aldactone 12.5mg daily. He had a MUGA scan 11/30/23 EF 23%. Today, he reports he has SOB with walking more than usual. 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 Patient currently denies any weight gain, edema, palpitations, chest pain, dizziness, and syncope. He is  taking Entresto as prescribed, and all other medications. He is schedule to see Dr Sedrick Merlos for consult re ICD     Patient is vaccinated against Covid.  Pfizer 5/5       12 point ROS negative in all areas as listed below except as in [address], EENT, pulmonary, GI, , Musculoskeletal, skin, neurological, hematological, endocrine, Psychiatric      Reviewed past medical

## 2023-12-11 ENCOUNTER — OFFICE VISIT (OUTPATIENT)
Dept: CARDIOLOGY CLINIC | Age: 78
End: 2023-12-11
Payer: MEDICARE

## 2023-12-11 VITALS
DIASTOLIC BLOOD PRESSURE: 70 MMHG | BODY MASS INDEX: 35.52 KG/M2 | SYSTOLIC BLOOD PRESSURE: 130 MMHG | HEART RATE: 58 BPM | HEIGHT: 73 IN | OXYGEN SATURATION: 99 % | WEIGHT: 268 LBS

## 2023-12-11 DIAGNOSIS — I25.5 ISCHEMIC CARDIOMYOPATHY: ICD-10-CM

## 2023-12-11 DIAGNOSIS — I77.810 AORTIC ROOT DILATION (HCC): ICD-10-CM

## 2023-12-11 DIAGNOSIS — I10 PRIMARY HYPERTENSION: ICD-10-CM

## 2023-12-11 DIAGNOSIS — R06.02 SOB (SHORTNESS OF BREATH): ICD-10-CM

## 2023-12-11 DIAGNOSIS — I25.810 CORONARY ARTERY DISEASE INVOLVING CORONARY BYPASS GRAFT OF NATIVE HEART WITHOUT ANGINA PECTORIS: Primary | ICD-10-CM

## 2023-12-11 DIAGNOSIS — E78.2 MIXED HYPERLIPIDEMIA: ICD-10-CM

## 2023-12-11 PROCEDURE — 3078F DIAST BP <80 MM HG: CPT | Performed by: INTERNAL MEDICINE

## 2023-12-11 PROCEDURE — G8427 DOCREV CUR MEDS BY ELIG CLIN: HCPCS | Performed by: INTERNAL MEDICINE

## 2023-12-11 PROCEDURE — 99214 OFFICE O/P EST MOD 30 MIN: CPT | Performed by: INTERNAL MEDICINE

## 2023-12-11 PROCEDURE — 3075F SYST BP GE 130 - 139MM HG: CPT | Performed by: INTERNAL MEDICINE

## 2023-12-11 PROCEDURE — 1123F ACP DISCUSS/DSCN MKR DOCD: CPT | Performed by: INTERNAL MEDICINE

## 2023-12-11 PROCEDURE — 1036F TOBACCO NON-USER: CPT | Performed by: INTERNAL MEDICINE

## 2023-12-11 PROCEDURE — G8417 CALC BMI ABV UP PARAM F/U: HCPCS | Performed by: INTERNAL MEDICINE

## 2023-12-11 PROCEDURE — G8484 FLU IMMUNIZE NO ADMIN: HCPCS | Performed by: INTERNAL MEDICINE

## 2023-12-11 NOTE — PATIENT INSTRUCTIONS
Plan:  Medications reviewed. Medications refilled as warranted.     Continue as planned with Electrophysiology  consultation for cardiomyopathy ~ recent MUGA scan, he has a spinal cord stimulator   Labs reviewed  Follow up with Dellia Seip, CNP in 3 months  Follow up with me in 6 months

## 2024-01-03 NOTE — PROGRESS NOTES
MD Jie.     CRT D, LB vs CS lead, follow up post procedure    This note has been scribed in the presence of Sameer Eli MD, by Yamilka Corbin RN.     Physician Attestation: I, Dr. Sameer Eli, confirm that the scribe's documentation has been prepared under my direction and personally reviewed by me in its entirety.  I also confirm that the note above accurately reflects all work, treatment, procedures, and medical decision making performed by me.    NOTE: This report was transcribed using voice recognition software. Every effort was made to ensure accuracy, however, inadvertent computerized transcription errors may be present.     Sameer Eli MD  [facility]   Office: [phone]  Fax: [phone]

## 2024-01-09 ENCOUNTER — OFFICE VISIT (OUTPATIENT)
Dept: CARDIOLOGY CLINIC | Age: 79
End: 2024-01-09

## 2024-01-09 VITALS
SYSTOLIC BLOOD PRESSURE: 108 MMHG | HEIGHT: 73 IN | WEIGHT: 260 LBS | RESPIRATION RATE: 16 BRPM | BODY MASS INDEX: 34.46 KG/M2 | DIASTOLIC BLOOD PRESSURE: 58 MMHG | HEART RATE: 55 BPM | OXYGEN SATURATION: 98 %

## 2024-01-09 DIAGNOSIS — I25.5 ISCHEMIC CARDIOMYOPATHY: Primary | ICD-10-CM

## 2024-01-10 ENCOUNTER — TELEPHONE (OUTPATIENT)
Dept: CARDIOLOGY CLINIC | Age: 79
End: 2024-01-10

## 2024-01-10 NOTE — TELEPHONE ENCOUNTER
[facility] Heart Red Level  EP Procedure Sheet    01/10/24  Eric Lima  1945  [id number]  EP Procedures  [] Pacemaker implant (single/dual) [] EP Study   [] ICD implant (single/dual) [] Atrial flutter ablation (SELVIN Y/N)   [x] Biv implant ICD (LEFT BUNDLE LEAD PLACEMENT)  [] Tilt Table   [] Biv implant PPM [] Atrial fibrillation ablation (SELVIN Yes)   [] Generator Change (PPM/ICD/BiV) [] SVT ablation   [] Lead revision (RV/LA/RA) (<1 month) [] PVC ablation     [] Lead extraction +/- upgrade (BiV/PPM/ICD) [] VT Ischemic/ non-ischemic   [] Loop implant/ removal [] VT RVOT   [] Cardioversion [] VT Left sided   [] SELVIN [] AVN ablation   Equipment  [x] Medtronic  [] JO-ANN Mapping System    [] Precision [] Ensite X   [] [employer]. Saulo/Guerrero [] Carto Mapping System   [] [employer] [employer] [] CryoAblation   [] Biotronik [] Laser Lead Extraction   EP Procedures Scheduling Request  # hours Requested  []1 []2 []2-4 [] 4-6 Scheduled  Date:   Specific Day  Completed    Anesthesia [x]yes []no F/u Date:   CT surgery backup []yes []no Location []FF[x]AND   Overnight stay   []KW[]Donnell Buck MD []RMM []MXA []MW  []UL [x]CMV  []WW [] RWH   []KA [] JMB [] AJK  First vs repeat   []1st [] 2nd [] 3rd   Pre-Procedure Labs / Imaging  [] PT/INR [] Type & cross   [x] CBC [] Units PRBC   [x] BMP/Mg [] Units FFP   [] Venogram [] Cardiac CTA for Pulmonary vein mapping     RN INITIALS: AJD    Patient Instructions  Dx: ICD-10 code:     Do not eat or drink after midnight the night prior to procedure [x] Yes [] No    MEDICATION INSTRUCTIONS :   HOLD lasix and insulin morning of. Check blood sugar morning of.

## 2024-01-12 NOTE — TELEPHONE ENCOUNTER
Procedure:  CRT-D (left bundle lead)  Doctor:  Dr. Eli  Date:  1/29/24  Time:  8am  Arrival:  6:30am  Reps:  Medtronic  Anesthesia:  Yes      Spoke with patient. Please have patient arrive to the main entrance of [facility] ([address] [place], [place] [id number]) and check in with the registration desk.  They will be directed to the Cath Lab.  Please call patient regarding medication instructions. Remind patient to be NPO after midnight (8 hours prior). Do not apply lotions/creams on skin the day of procedure.

## 2024-01-28 ENCOUNTER — ANESTHESIA EVENT (OUTPATIENT)
Dept: CARDIAC CATH/INVASIVE PROCEDURES | Age: 79
End: 2024-01-28
Payer: MEDICARE

## 2024-01-28 PROBLEM — Z95.810 CARDIAC RESYNCHRONIZATION THERAPY DEFIBRILLATOR (CRT-D) IN PLACE: Status: ACTIVE | Noted: 2024-01-28

## 2024-01-29 ENCOUNTER — ANESTHESIA (OUTPATIENT)
Dept: CARDIAC CATH/INVASIVE PROCEDURES | Age: 79
End: 2024-01-29
Payer: MEDICARE

## 2024-01-29 ENCOUNTER — HOSPITAL ENCOUNTER (OUTPATIENT)
Dept: CARDIAC CATH/INVASIVE PROCEDURES | Age: 79
Discharge: HOME | End: 2024-01-29
Attending: INTERNAL MEDICINE | Admitting: INTERNAL MEDICINE
Payer: MEDICARE

## 2024-01-29 ENCOUNTER — PROCEDURE VISIT (OUTPATIENT)
Dept: CARDIOLOGY CLINIC | Age: 79
End: 2024-01-29

## 2024-01-29 ENCOUNTER — NURSE ONLY (OUTPATIENT)
Dept: CARDIOLOGY CLINIC | Age: 79
End: 2024-01-29

## 2024-01-29 VITALS
HEART RATE: 67 BPM | SYSTOLIC BLOOD PRESSURE: 122 MMHG | RESPIRATION RATE: 22 BRPM | DIASTOLIC BLOOD PRESSURE: 80 MMHG | OXYGEN SATURATION: 100 %

## 2024-01-29 DIAGNOSIS — Z95.810 CARDIAC RESYNCHRONIZATION THERAPY DEFIBRILLATOR (CRT-D) IN PLACE: Primary | ICD-10-CM

## 2024-01-29 DIAGNOSIS — I25.5 ISCHEMIC CARDIOMYOPATHY: ICD-10-CM

## 2024-01-29 LAB
ANION GAP SERPL CALCULATED.3IONS-SCNC: 9 MMOL/L (ref 3–16)
BUN SERPL-MCNC: 65 MG/DL (ref 7–20)
CALCIUM SERPL-MCNC: 8.8 MG/DL (ref 8.3–10.6)
CHLORIDE SERPL-SCNC: 104 MMOL/L (ref 99–110)
CO2 SERPL-SCNC: 25 MMOL/L (ref 21–32)
CREAT SERPL-MCNC: 1.6 MG/DL (ref 0.8–1.3)
DEPRECATED RDW RBC AUTO: 13.6 % (ref 12.4–15.4)
GFR SERPLBLD CREATININE-BSD FMLA CKD-EPI: 44 ML/MIN/{1.73_M2}
GLUCOSE SERPL-MCNC: 144 MG/DL (ref 70–99)
HCT VFR BLD AUTO: 43.4 % (ref 40.5–52.5)
HGB BLD-MCNC: 14.6 G/DL (ref 13.5–17.5)
MCH RBC QN AUTO: 30.2 PG (ref 26–34)
MCHC RBC AUTO-ENTMCNC: 33.5 G/DL (ref 31–36)
MCV RBC AUTO: 90.2 FL (ref 80–100)
PLATELET # BLD AUTO: 145 K/UL (ref 135–450)
PMV BLD AUTO: 10.3 FL (ref 5–10.5)
POTASSIUM SERPL-SCNC: 4.8 MMOL/L (ref 3.5–5.1)
RBC # BLD AUTO: 4.82 M/UL (ref 4.2–5.9)
SODIUM SERPL-SCNC: 138 MMOL/L (ref 136–145)
WBC # BLD AUTO: 8.5 K/UL (ref 4–11)

## 2024-01-29 PROCEDURE — C1889 IMPLANT/INSERT DEVICE, NOC: HCPCS | Performed by: INTERNAL MEDICINE

## 2024-01-29 PROCEDURE — 93005 ELECTROCARDIOGRAM TRACING: CPT | Performed by: INTERNAL MEDICINE

## 2024-01-29 PROCEDURE — C1769 GUIDE WIRE: HCPCS | Performed by: INTERNAL MEDICINE

## 2024-01-29 PROCEDURE — C1898 LEAD, PMKR, OTHER THAN TRANS: HCPCS | Performed by: INTERNAL MEDICINE

## 2024-01-29 PROCEDURE — C1721 AICD, DUAL CHAMBER: HCPCS | Performed by: INTERNAL MEDICINE

## 2024-01-29 PROCEDURE — C1733 CATH, EP, OTHR THAN COOL-TIP: HCPCS | Performed by: INTERNAL MEDICINE

## 2024-01-29 PROCEDURE — C1887 CATHETER, GUIDING: HCPCS | Performed by: INTERNAL MEDICINE

## 2024-01-29 PROCEDURE — 2709999900 HC NON-CHARGEABLE SUPPLY: Performed by: INTERNAL MEDICINE

## 2024-01-29 PROCEDURE — 2500000003 HC RX 250 WO HCPCS: Performed by: NURSE ANESTHETIST, CERTIFIED REGISTERED

## 2024-01-29 PROCEDURE — 33249 INSJ/RPLCMT DEFIB W/LEAD(S): CPT | Performed by: INTERNAL MEDICINE

## 2024-01-29 PROCEDURE — 2500000003 HC RX 250 WO HCPCS

## 2024-01-29 PROCEDURE — 6360000002 HC RX W HCPCS: Performed by: NURSE ANESTHETIST, CERTIFIED REGISTERED

## 2024-01-29 PROCEDURE — 80048 BASIC METABOLIC PNL TOTAL CA: CPT

## 2024-01-29 PROCEDURE — C1895 LEAD, AICD, ENDO DUAL COIL: HCPCS | Performed by: INTERNAL MEDICINE

## 2024-01-29 PROCEDURE — 33249 INSJ/RPLCMT DEFIB W/LEAD(S): CPT

## 2024-01-29 PROCEDURE — C1892 INTRO/SHEATH,FIXED,PEEL-AWAY: HCPCS | Performed by: INTERNAL MEDICINE

## 2024-01-29 PROCEDURE — 3700000000 HC ANESTHESIA ATTENDED CARE: Performed by: ANESTHESIOLOGY

## 2024-01-29 PROCEDURE — C1894 INTRO/SHEATH, NON-LASER: HCPCS | Performed by: INTERNAL MEDICINE

## 2024-01-29 PROCEDURE — 85027 COMPLETE CBC AUTOMATED: CPT

## 2024-01-29 PROCEDURE — 2580000003 HC RX 258: Performed by: NURSE ANESTHETIST, CERTIFIED REGISTERED

## 2024-01-29 PROCEDURE — 3700000001 HC ADD 15 MINUTES (ANESTHESIA): Performed by: ANESTHESIOLOGY

## 2024-01-29 PROCEDURE — 6360000002 HC RX W HCPCS

## 2024-01-29 PROCEDURE — 2580000003 HC RX 258

## 2024-01-29 PROCEDURE — 6360000002 HC RX W HCPCS: Performed by: INTERNAL MEDICINE

## 2024-01-29 PROCEDURE — C1730 CATH, EP, 19 OR FEW ELECT: HCPCS | Performed by: INTERNAL MEDICINE

## 2024-01-29 RX ORDER — SODIUM CHLORIDE 9 MG/ML
INJECTION, SOLUTION INTRAVENOUS PRN
Status: DISCONTINUED | OUTPATIENT
Start: 2024-01-29 | End: 2024-01-29 | Stop reason: HOSPADM

## 2024-01-29 RX ORDER — EPHEDRINE SULFATE 50 MG/ML
INJECTION INTRAVENOUS PRN
Status: DISCONTINUED | OUTPATIENT
Start: 2024-01-29 | End: 2024-01-29 | Stop reason: SDUPTHER

## 2024-01-29 RX ORDER — SODIUM CHLORIDE, SODIUM LACTATE, POTASSIUM CHLORIDE, CALCIUM CHLORIDE 600; 310; 30; 20 MG/100ML; MG/100ML; MG/100ML; MG/100ML
INJECTION, SOLUTION INTRAVENOUS CONTINUOUS
Status: DISCONTINUED | OUTPATIENT
Start: 2024-01-29 | End: 2024-01-29 | Stop reason: HOSPADM

## 2024-01-29 RX ORDER — SODIUM CHLORIDE 0.9 % (FLUSH) 0.9 %
5-40 SYRINGE (ML) INJECTION EVERY 12 HOURS SCHEDULED
Status: DISCONTINUED | OUTPATIENT
Start: 2024-01-29 | End: 2024-01-29 | Stop reason: HOSPADM

## 2024-01-29 RX ORDER — EPINEPHRINE 1 MG/ML
INJECTION INTRAMUSCULAR; INTRAVENOUS; SUBCUTANEOUS PRN
Status: DISCONTINUED | OUTPATIENT
Start: 2024-01-29 | End: 2024-01-29 | Stop reason: SDUPTHER

## 2024-01-29 RX ORDER — PROPOFOL 10 MG/ML
INJECTION, EMULSION INTRAVENOUS PRN
Status: DISCONTINUED | OUTPATIENT
Start: 2024-01-29 | End: 2024-01-29

## 2024-01-29 RX ORDER — SODIUM CHLORIDE 0.9 % (FLUSH) 0.9 %
5-40 SYRINGE (ML) INJECTION PRN
Status: DISCONTINUED | OUTPATIENT
Start: 2024-01-29 | End: 2024-01-29 | Stop reason: HOSPADM

## 2024-01-29 RX ORDER — CEFAZOLIN SODIUM IN 0.9 % NACL 2 G/100 ML
2000 PLASTIC BAG, INJECTION (ML) INTRAVENOUS ONCE
Status: COMPLETED | OUTPATIENT
Start: 2024-01-29 | End: 2024-01-29

## 2024-01-29 RX ORDER — LIDOCAINE HYDROCHLORIDE 10 MG/ML
1 INJECTION, SOLUTION EPIDURAL; INFILTRATION; INTRACAUDAL; PERINEURAL
Status: DISCONTINUED | OUTPATIENT
Start: 2024-01-29 | End: 2024-01-29 | Stop reason: HOSPADM

## 2024-01-29 RX ORDER — MEPERIDINE HYDROCHLORIDE 50 MG/ML
12.5 INJECTION INTRAMUSCULAR; INTRAVENOUS; SUBCUTANEOUS EVERY 5 MIN PRN
Status: DISCONTINUED | OUTPATIENT
Start: 2024-01-29 | End: 2024-01-29 | Stop reason: HOSPADM

## 2024-01-29 RX ORDER — MIDAZOLAM HYDROCHLORIDE 1 MG/ML
2 INJECTION INTRAMUSCULAR; INTRAVENOUS
Status: DISCONTINUED | OUTPATIENT
Start: 2024-01-29 | End: 2024-01-29 | Stop reason: HOSPADM

## 2024-01-29 RX ORDER — MIDAZOLAM HYDROCHLORIDE 1 MG/ML
1 INJECTION INTRAMUSCULAR; INTRAVENOUS EVERY 5 MIN PRN
Status: DISCONTINUED | OUTPATIENT
Start: 2024-01-29 | End: 2024-01-29 | Stop reason: HOSPADM

## 2024-01-29 RX ORDER — OXYCODONE HYDROCHLORIDE 5 MG/1
5 TABLET ORAL
Status: DISCONTINUED | OUTPATIENT
Start: 2024-01-29 | End: 2024-01-29 | Stop reason: HOSPADM

## 2024-01-29 RX ORDER — PROPOFOL 10 MG/ML
INJECTION, EMULSION INTRAVENOUS CONTINUOUS PRN
Status: DISCONTINUED | OUTPATIENT
Start: 2024-01-29 | End: 2024-01-29 | Stop reason: SDUPTHER

## 2024-01-29 RX ORDER — HYDROMORPHONE HYDROCHLORIDE 1 MG/ML
0.25 INJECTION, SOLUTION INTRAMUSCULAR; INTRAVENOUS; SUBCUTANEOUS EVERY 5 MIN PRN
Status: DISCONTINUED | OUTPATIENT
Start: 2024-01-29 | End: 2024-01-29 | Stop reason: HOSPADM

## 2024-01-29 RX ORDER — CEFAZOLIN SODIUM 1 G/3ML
INJECTION, POWDER, FOR SOLUTION INTRAMUSCULAR; INTRAVENOUS PRN
Status: DISCONTINUED | OUTPATIENT
Start: 2024-01-29 | End: 2024-01-29 | Stop reason: SDUPTHER

## 2024-01-29 RX ORDER — HYDRALAZINE HYDROCHLORIDE 20 MG/ML
5 INJECTION INTRAMUSCULAR; INTRAVENOUS
Status: DISCONTINUED | OUTPATIENT
Start: 2024-01-29 | End: 2024-01-29 | Stop reason: HOSPADM

## 2024-01-29 RX ORDER — DIPHENHYDRAMINE HYDROCHLORIDE 50 MG/ML
12.5 INJECTION INTRAMUSCULAR; INTRAVENOUS
Status: DISCONTINUED | OUTPATIENT
Start: 2024-01-29 | End: 2024-01-29 | Stop reason: HOSPADM

## 2024-01-29 RX ORDER — SODIUM CHLORIDE 9 MG/ML
INJECTION, SOLUTION INTRAVENOUS CONTINUOUS PRN
Status: DISCONTINUED | OUTPATIENT
Start: 2024-01-29 | End: 2024-01-29 | Stop reason: SDUPTHER

## 2024-01-29 RX ORDER — HYDROMORPHONE HYDROCHLORIDE 1 MG/ML
0.5 INJECTION, SOLUTION INTRAMUSCULAR; INTRAVENOUS; SUBCUTANEOUS EVERY 5 MIN PRN
Status: DISCONTINUED | OUTPATIENT
Start: 2024-01-29 | End: 2024-01-29 | Stop reason: HOSPADM

## 2024-01-29 RX ORDER — ONDANSETRON 2 MG/ML
4 INJECTION INTRAMUSCULAR; INTRAVENOUS EVERY 10 MIN PRN
Status: DISCONTINUED | OUTPATIENT
Start: 2024-01-29 | End: 2024-01-29 | Stop reason: HOSPADM

## 2024-01-29 RX ADMIN — EPINEPHRINE 10 MCG: 1 INJECTION PARENTERAL at 08:43

## 2024-01-29 RX ADMIN — PHENYLEPHRINE HYDROCHLORIDE 30 MCG/MIN: 10 INJECTION INTRAVENOUS at 08:21

## 2024-01-29 RX ADMIN — EPHEDRINE SULFATE 10 MG: 50 INJECTION INTRAVENOUS at 09:16

## 2024-01-29 RX ADMIN — EPHEDRINE SULFATE 10 MG: 50 INJECTION INTRAVENOUS at 12:06

## 2024-01-29 RX ADMIN — Medication 2000 MG: at 08:10

## 2024-01-29 RX ADMIN — SODIUM CHLORIDE: 9 INJECTION, SOLUTION INTRAVENOUS at 08:00

## 2024-01-29 RX ADMIN — EPHEDRINE SULFATE 10 MG: 50 INJECTION INTRAVENOUS at 08:51

## 2024-01-29 RX ADMIN — PROPOFOL 120 MCG/KG/MIN: 10 INJECTION, EMULSION INTRAVENOUS at 08:10

## 2024-01-29 RX ADMIN — CEFAZOLIN 1 G: 1 INJECTION, POWDER, FOR SOLUTION INTRAMUSCULAR; INTRAVENOUS at 12:31

## 2024-01-29 NOTE — DISCHARGE INSTRUCTIONS
Cath Labs at  [facility]    Pacemaker Discharge Instructions    1/29/2024  Eric Lima   Date of Birth 1945     Activity:  You can ride in a car, but no driving for 24 hours.  Do not raise your left arm above your heart for 2 weeks.  Resume regular activities in 24 hours.  Return to work/ school in 24 hours.    Diet:    Resume previous diet.    Dressing:  Keep site clean and dry until cleared by your physician.  Remove the outer dressing in 48 hours, leave steri strips intact until seen in office.  Ice pack to pacer site as needed for pain next 24 hours.    Special Instructions:  Report any of the following to physician or 911:  Any difficulty breathing, change in heart rhythm, change in level or consciousness or alertness, fever or chills.  Small amount of bruising and drainage can be expected.  Any questions or concerns please contact your doctor.  If you received contrast during your new pacemaker placement, and you are currently taking Metformin or Metformin combination medications for Diabetes, hold your dose for 48 hours after your procedure.  If you have any questions, please call your doctor.  If you cannot reach your Doctor then call the Emergency Department at  [phone].  Explain to the Emergency Department the procedure you had performed and they will be able to assist you.  If you seek Emergency Care, bring this form with you.      Sedation Discharge Instructions:  For the next 24 hours do not drive a car, operate machinery, power tools or kitchen appliances.    Do not drink alcohol; including beer or wine.    Do not make any important decisions or sign any important papers.  For the next 24 hours you can expect drowsiness, light-headed or dizziness, nausea/ vomiting, inability to concentrate, fatigue and desire to sleep.  We strongly suggest that a responsible adult be with for the next 24 hours, for your protection and safety.  You are not allowed to

## 2024-01-29 NOTE — PROGRESS NOTES
Patient dressed and IV's removed. DC instructions discussed with AUDIE Zamarripa and patient with his wife. All questions answered. Pacemaker site remained unremarkable, sling/swathe in place.

## 2024-01-29 NOTE — H&P
[facility]          Electrophysiology H&P Note       Date of Procedure: 1/29/2024  Patient's Name: Eric Lima  YOB: 1945   Medical Record Number: [id number]    Indication:  Ischemic cardiomyopathy   Primary prevention ICD    Consent:   I have discussed with the patient and/or the patient representative the indication, alternatives, and the possible risks and/or complications of the planned procedure and the anesthesia methods. The patient and/or patient representative appear to understand and agree to proceed.    Past Medical History:   Diagnosis Date    Acute systolic congestive heart failure (HCC) 2/8/2023    Prostate cancer (HCC)     Tobacco use        Past Surgical History:   Procedure Laterality Date    CARDIAC CATHETERIZATION  02/17/2023    CORONARY ANGIOPLASTY WITH STENT PLACEMENT  02/20/2023    YUN to stenosis of SVG-OM1; LIMA-LAD widely patent; SVG-% occluded    CORONARY ARTERY BYPASS GRAFT      CYSTOSCOPY N/A 06/20/2023    CYSTOSCOPY EVACUATION OF CLOTS performed by Evan Hurtado MD at [facility] OR    CYSTOSCOPY N/A 06/26/2023    CYSTOSCOPY WITH A CLOT EVACUATION performed by Harley Kenney MD at [facility] [place]       Prior to Admission medications    Medication Sig Start Date End Date Taking? Authorizing Provider   sacubitril-valsartan (ENTRESTO) 24-26 MG per tablet Take 1 tablet by mouth 2 times daily 10/5/23   George Monte APRN - CNP   carvedilol (COREG) 6.25 MG tablet Take 1 tablet by mouth 2 times daily (with meals) 9/5/23   George Monte APRN - CNP   aspirin 81 MG EC tablet Take 1 tablet by mouth daily 7/31/23   George Monte APRN - CNP   clopidogrel (PLAVIX) 75 MG tablet Take 1 tablet by mouth daily 5/31/23   Raymond Ceron MD   isosorbide mononitrate (IMDUR) 30 MG extended release tablet Take 1 tablet by mouth daily 4/12/23   George Monte APRN - CNP   furosemide (LASIX) 40 MG tablet Take 1 tablet by mouth daily Each morning 3/13/23   Octavio Fishman MD   insulin

## 2024-01-29 NOTE — PROGRESS NOTES
CRT-D IMP (Left Bundle Lead Placement) [employer] CMV  scheduled 1/29/24    DEVICE MODEL  MANB3G6 Cobalt™ XT DR (ICD)  DEVICE SERIAL NUMBER  [id number]  DEVICE TYPE  ICD  DATE OF IMPLANT  29-Jan-2024  Monitor Information  MONITOR STATUS  Transmission Received  DISTRIBUTION METHOD  Distributed from clinic inventory  DISTRIBUTION DATE  29-Jan-2024  MONITOR SERIAL NUMBER  [id number]  MONITOR MODEL  [id number]

## 2024-01-29 NOTE — ANESTHESIA PRE PROCEDURE
Department of Anesthesiology  Preprocedure Note       Name:  Eric Lima   Age:  78 y.o.  :  1945                                          MRN:  [id number]         Date:  2024      Surgeon: * Surgery not found *    Procedure:     Medications prior to admission:   Prior to Admission medications    Medication Sig Start Date End Date Taking? Authorizing Provider   sacubitril-valsartan (ENTRESTO) 24-26 MG per tablet Take 1 tablet by mouth 2 times daily 10/5/23   George Monte APRN - CNP   carvedilol (COREG) 6.25 MG tablet Take 1 tablet by mouth 2 times daily (with meals) 23   George Monte APRN - CNP   aspirin 81 MG EC tablet Take 1 tablet by mouth daily 23   George Monte APRN - CNP   clopidogrel (PLAVIX) 75 MG tablet Take 1 tablet by mouth daily 23   Raymond Ceron MD   isosorbide mononitrate (IMDUR) 30 MG extended release tablet Take 1 tablet by mouth daily 23   George Monte APRN - CNP   furosemide (LASIX) 40 MG tablet Take 1 tablet by mouth daily Each morning 3/13/23   Octavio Fishman MD   insulin detemir (LEVEMIR FLEXTOUCH) 100 UNIT/ML injection pen Inject 30 Units into the skin 2 times daily 23   Nena Mccracken APRN - CNP   tamsulosin (FLOMAX) 0.4 MG capsule Take 2 capsules by mouth at bedtime  Patient taking differently: Take 1 capsule by mouth at bedtime 23   Nena Mccracken APRN - CNP   atorvastatin (LIPITOR) 40 MG tablet Take 1 tablet by mouth daily Take at bedtime    Provider, MD Ben       Current medications:    Current Outpatient Medications   Medication Sig Dispense Refill    sacubitril-valsartan (ENTRESTO) 24-26 MG per tablet Take 1 tablet by mouth 2 times daily 60 tablet 0    carvedilol (COREG) 6.25 MG tablet Take 1 tablet by mouth 2 times daily (with meals) 180 tablet 3    aspirin 81 MG EC tablet Take 1 tablet by mouth daily 90 tablet 3    clopidogrel (PLAVIX) 75 MG tablet Take 1 tablet by mouth daily 90 tablet 3    isosorbide mononitrate

## 2024-01-30 LAB
EKG ATRIAL RATE: 69 BPM
EKG DIAGNOSIS: NORMAL
EKG P AXIS: -7 DEGREES
EKG P-R INTERVAL: 156 MS
EKG Q-T INTERVAL: 434 MS
EKG QRS DURATION: 150 MS
EKG QTC CALCULATION (BAZETT): 465 MS
EKG R AXIS: -47 DEGREES
EKG T AXIS: 106 DEGREES
EKG VENTRICULAR RATE: 69 BPM

## 2024-01-30 PROCEDURE — 93010 ELECTROCARDIOGRAM REPORT: CPT | Performed by: INTERNAL MEDICINE

## 2024-01-30 NOTE — ANESTHESIA POSTPROCEDURE EVALUATION
Department of Anesthesiology  Postprocedure Note    Patient: Eric Lima  MRN: [id number]  YOB: 1945  Date of evaluation: 1/29/2024    Procedure Summary       Date: 01/29/24 Room / Location: [facility] Cardiac Cath Lab    Anesthesia Start: 0800 Anesthesia Stop: 1354    Procedure: ICD Diagnosis:       Ischemic cardiomyopathy      Acute on chronic systolic (congestive) heart failure    Scheduled Providers:  Responsible Provider: Ramirez Reyes MD    Anesthesia Type: MAC ASA Status: 3            Anesthesia Type: No value filed.    Gabino Phase I:      Gabino Phase II:      Anesthesia Post Evaluation    Comments: Postoperative Anesthesia Note    Name:    Eric Lima  MRN:      [id number]    Patient Vitals in the past 12 hrs:  01/29/24 1546, Pulse:67, SpO2:100 %  01/29/24 1545, Pulse:66, SpO2:100 %  01/29/24 1544, Pulse:66, SpO2:100 %  01/29/24 1543, Pulse:67, SpO2:100 %  01/29/24 1542, Pulse:67, SpO2:100 %  01/29/24 1541, Pulse:69, SpO2:100 %  01/29/24 1540, Pulse:68, SpO2:100 %  01/29/24 1539, Pulse:72, SpO2:100 %  01/29/24 1538, Pulse:73, SpO2:100 %  01/29/24 1537, Pulse:71, SpO2:100 %  01/29/24 1536, Pulse:73, SpO2:100 %  01/29/24 1535, Pulse:75, SpO2:100 %  01/29/24 1534, Pulse:73, SpO2:100 %  01/29/24 1533, BP:122/80, Pulse:72, SpO2:100 %  01/29/24 1532, Pulse:69, SpO2:100 %  01/29/24 1531, Pulse:69, SpO2:100 %  01/29/24 1530, Pulse:72, SpO2:100 %  01/29/24 1529, SpO2:100 %  01/29/24 1528, Pulse:70, SpO2:100 %  01/29/24 1527, Pulse:71, SpO2:100 %  01/29/24 1526, Pulse:71, SpO2:100 %  01/29/24 1525, Pulse:71, SpO2:100 %  01/29/24 1524, Pulse:73, SpO2:100 %  01/29/24 1523, Pulse:77, SpO2:100 %  01/29/24 1522, Pulse:71, SpO2:100 %  01/29/24 1521, Pulse:71, SpO2:100 %  01/29/24 1520, Pulse:73, SpO2:100 %  01/29/24 1519, Pulse:71, SpO2:100 %  01/29/24 1518, BP:(!) 137/59, Pulse:71, SpO2:100 %  01/29/24 1517, BP:(!) 137/59, Pulse:71, SpO2:100 %  01/29/24 1516, Pulse:73, SpO2:100 %  01/29/24 1515,

## 2024-01-30 NOTE — PROCEDURES
and device information:             Assessment:  Successful implantation of a dual-chamber ICD  Left bundle area pacing lead was attempted resulting in poor pacing morphology despite multiple locations (-160ms)  Difficult coronary sinus access ultimately requiring right internal jugular venous access, placement of deflectable mapping catheter but unable to cannulate the CS effectively from the pocket thus unable to perform contrast injection or pass lead    Plan:  Portable CXR post op  Discussed inadequate left bundle pacing and difficult coronary sinus access, options include repeat attempt at coronary sinus, epicardial lead placement or continued medical management, will discuss again at follow up visit   Wound check and device interrogation in 1 week, subsequent device interrogation in 3 months    Sameer Eli MD  [facility]   Office: [phone]  Fax: [phone]

## 2024-02-05 NOTE — CARE COORDINATION
Patient discharged 2/21/2023 to home.    All discharge needs met per case management     Abril Bob RN, BSN  [phone]
Pt had PCI will monitor for d/c needs.     Abril Bob RN, BSN  [phone]
5 (moderate pain)

## 2024-02-07 ENCOUNTER — NURSE ONLY (OUTPATIENT)
Dept: CARDIOLOGY CLINIC | Age: 79
End: 2024-02-07
Payer: MEDICARE

## 2024-02-07 DIAGNOSIS — Z95.810 ICD (IMPLANTABLE CARDIOVERTER-DEFIBRILLATOR) IN PLACE: Primary | ICD-10-CM

## 2024-02-07 DIAGNOSIS — I25.5 ISCHEMIC CARDIOMYOPATHY: ICD-10-CM

## 2024-02-07 PROCEDURE — 93283 PRGRMG EVAL IMPLANTABLE DFB: CPT | Performed by: INTERNAL MEDICINE

## 2024-02-07 NOTE — PATIENT INSTRUCTIONS
New Cardiac Device Implant (Pacemaker and/or Defibrillator) Post Op Instructions  Bathe with water indirectly hitting the incision site. Water and soap may run over the incision site. Do not scrub. Pat dry with a clean towel after bathing.   Leave incision open to the air; do not apply any dressings, ointments, or bandages to the area. Do not apply lotion, perfume/cologne, or powders to the area until it is completely healed.   Any scabbing or skin glue that is noted will fall off within 1-2 weeks after the post op appointment.   If any oozing, bleeding, or pus drainage occurs, please call the office immediately at [phone].       Patient has movement restrictions in place until 4 weeks post op (to the day of implant) unless otherwise instructed by physician.   Patient may not lift the device side arm above shoulder height.   Do not far reach or stretch across body or behind body with effected side.   Do not use this arm for pushing, pulling, or lifting body.   Do not use cane on the effected side.   Patient may not lift anything heavier than a gallon of milk with the associated arm.     Appointments to expect going forward:  Post operatively the patient will have had a 1-week post op check and a 3 month follow up with NP/MD and the device clinic.       Remote Monitoring Instructions    Within 2-3 weeks of your device being implanted, you will receive a call from the  of your device. Please answer this call as it is to set up remote monitoring for your device. Once you receive your in-home monitor, please follow the instructions provided to sync the home monitor to your implanted device. Once you have paired your home monitor to your implanted device, keep your monitor plugged in within 6 feet of where you sleep. Your monitor will routinely check in with your device during sleep hours and transmit any urgent events to the Device Clinic for review.     Please do not send additional routine

## 2024-03-07 NOTE — PROGRESS NOTES
s/p CABG early 1990s at   Ischemic cardiomyopathy: EF 23% on MUGA 11/2023; EF 15-20% on echo 6/2023; EF 25-30% on echo 2/2023   - s/p dual chamber ICD 1/29/2024 (unsuccessful LV lead placement)  HFrEF: appears compensated  HTN: controlled  HLD: controlled, LDL 49, statin  DM: hgb a1c 7.1  CKD: follows with nephrologist at [facility], Dr. Kim  Hyperkalemia: Improved off spironolactone    Plan:   1.  Stop Plavix as he is > 1 year post PCI and having hematuria, continue aspirin  2.  Spironolactone stopped due to hyperkalemia  3.  Continue aspirin, statin, carvedilol, Entresto, Lasix, Imdur  4.  Jardiance ordered but cost may be prohibitive  5.  Monitor BP and weight at home and call the office if consistently out of goal range  6.  Regular exercise and healthy diet encouraged  7.  Follow-up as planned with EP  8.  Follow-up in 4 months with Dr. Jie Monte, APRN-CNP  [facility]  [phone]

## 2024-03-11 ENCOUNTER — OFFICE VISIT (OUTPATIENT)
Dept: CARDIOLOGY CLINIC | Age: 79
End: 2024-03-11
Payer: MEDICARE

## 2024-03-11 VITALS
HEIGHT: 73 IN | HEART RATE: 64 BPM | WEIGHT: 265 LBS | DIASTOLIC BLOOD PRESSURE: 68 MMHG | BODY MASS INDEX: 35.12 KG/M2 | OXYGEN SATURATION: 98 % | SYSTOLIC BLOOD PRESSURE: 128 MMHG

## 2024-03-11 DIAGNOSIS — I25.5 ISCHEMIC CARDIOMYOPATHY: ICD-10-CM

## 2024-03-11 DIAGNOSIS — I25.810 CORONARY ARTERY DISEASE INVOLVING CORONARY BYPASS GRAFT OF NATIVE HEART WITHOUT ANGINA PECTORIS: Primary | ICD-10-CM

## 2024-03-11 DIAGNOSIS — I50.20 HFREF (HEART FAILURE WITH REDUCED EJECTION FRACTION) (HCC): ICD-10-CM

## 2024-03-11 PROCEDURE — 3074F SYST BP LT 130 MM HG: CPT | Performed by: NURSE PRACTITIONER

## 2024-03-11 PROCEDURE — 1123F ACP DISCUSS/DSCN MKR DOCD: CPT | Performed by: NURSE PRACTITIONER

## 2024-03-11 PROCEDURE — G8427 DOCREV CUR MEDS BY ELIG CLIN: HCPCS | Performed by: NURSE PRACTITIONER

## 2024-03-11 PROCEDURE — G8484 FLU IMMUNIZE NO ADMIN: HCPCS | Performed by: NURSE PRACTITIONER

## 2024-03-11 PROCEDURE — 99214 OFFICE O/P EST MOD 30 MIN: CPT | Performed by: NURSE PRACTITIONER

## 2024-03-11 PROCEDURE — 1036F TOBACCO NON-USER: CPT | Performed by: NURSE PRACTITIONER

## 2024-03-11 PROCEDURE — G8417 CALC BMI ABV UP PARAM F/U: HCPCS | Performed by: NURSE PRACTITIONER

## 2024-03-11 PROCEDURE — 3078F DIAST BP <80 MM HG: CPT | Performed by: NURSE PRACTITIONER

## 2024-03-11 RX ORDER — ISOSORBIDE MONONITRATE 30 MG/1
30 TABLET, EXTENDED RELEASE ORAL DAILY
Qty: 90 TABLET | Refills: 3 | Status: SHIPPED | OUTPATIENT
Start: 2024-03-11

## 2024-03-11 NOTE — PATIENT INSTRUCTIONS
Everything looks great today, good job!  Will discuss aspirin and plavix with cardiologist and call you  Continue current medications  Stay active along with a healthy diet  Follow up in 4 months with Dr. Ceron

## 2024-05-01 NOTE — PROGRESS NOTES
[facility]   Electrophysiology Consultation     Date: 5/2/2024  Reason for Consultation: Ischemic cardiomyopathy.   Consult Requesting Provider: George Monte CNP.     CC: \"discuss device placement\"     HPI: Eric Lima is a 78 y.o. male history of hypertension, hyperlipidemia, diabetes, ischemic cardiomyopathy with reduced LVEF of 25 to 30%, CAD with history of CABG 30+ years prior, staged PCI in February 2023 has a past medical history of ICM, CAD s/p staged PCI 2/20/2023, echo 2/8/2023 with LVEF of 25 to 30%, severe hypokinesis and mild concentric LVH.  He has been on guideline directed medical therapy for over 3 months. MUGA scan on 11/30/2023 with an LVEF of 23%.  He underwent dual-chamber ICD placement on 1/29/2024, attempted left bundle area pacing lead with poor pacing morphology, difficult to access coronary sinus despite repeated attempts by myself and colleague.    Patient presents today as a follow-up for management of his device, device interrogation demonstrates 54% atrial pacing, less than 1% ventricular pacing and RASHAAD of 12 years.  Shortness of breath with exertion that has been unchanged, denies pain at pacemaker pocket site.    Review of System:  [x] Full ROS obtained and negative except as mentioned in HPI or below    Prior to Admission medications    Medication Sig Start Date End Date Taking? Authorizing Provider   Insulin Glargine (LANTUS SC) Inject 30 Units into the skin in the morning and 30 Units in the evening.   Yes Provider, MD Ben   sacubitril-valsartan (ENTRESTO) 24-26 MG per tablet Take 1 tablet by mouth 2 times daily 3/11/24  Yes George Monte APRN - CNP   isosorbide mononitrate (IMDUR) 30 MG extended release tablet Take 1 tablet by mouth daily 3/11/24  Yes George Monte APRN - CNP   carvedilol (COREG) 6.25 MG tablet Take 1 tablet by mouth 2 times daily (with meals) 9/5/23  Yes George Monte APRN - CNP   aspirin 81 MG EC tablet Take 1 tablet by mouth daily 7/31/23  Yes

## 2024-05-02 ENCOUNTER — NURSE ONLY (OUTPATIENT)
Dept: CARDIOLOGY CLINIC | Age: 79
End: 2024-05-02
Payer: MEDICARE

## 2024-05-02 ENCOUNTER — OFFICE VISIT (OUTPATIENT)
Dept: CARDIOLOGY CLINIC | Age: 79
End: 2024-05-02
Payer: MEDICARE

## 2024-05-02 VITALS
HEART RATE: 67 BPM | OXYGEN SATURATION: 97 % | DIASTOLIC BLOOD PRESSURE: 72 MMHG | BODY MASS INDEX: 35.17 KG/M2 | HEIGHT: 73 IN | WEIGHT: 265.4 LBS | SYSTOLIC BLOOD PRESSURE: 130 MMHG

## 2024-05-02 DIAGNOSIS — I25.5 ISCHEMIC CARDIOMYOPATHY: ICD-10-CM

## 2024-05-02 DIAGNOSIS — I25.5 ISCHEMIC CARDIOMYOPATHY: Primary | ICD-10-CM

## 2024-05-02 DIAGNOSIS — Z95.810 ICD (IMPLANTABLE CARDIOVERTER-DEFIBRILLATOR) IN PLACE: Primary | ICD-10-CM

## 2024-05-02 PROCEDURE — 1036F TOBACCO NON-USER: CPT | Performed by: INTERNAL MEDICINE

## 2024-05-02 PROCEDURE — G8417 CALC BMI ABV UP PARAM F/U: HCPCS | Performed by: INTERNAL MEDICINE

## 2024-05-02 PROCEDURE — G8427 DOCREV CUR MEDS BY ELIG CLIN: HCPCS | Performed by: INTERNAL MEDICINE

## 2024-05-02 PROCEDURE — 1123F ACP DISCUSS/DSCN MKR DOCD: CPT | Performed by: INTERNAL MEDICINE

## 2024-05-02 PROCEDURE — 93283 PRGRMG EVAL IMPLANTABLE DFB: CPT | Performed by: INTERNAL MEDICINE

## 2024-05-02 PROCEDURE — 93000 ELECTROCARDIOGRAM COMPLETE: CPT | Performed by: INTERNAL MEDICINE

## 2024-05-02 PROCEDURE — 3075F SYST BP GE 130 - 139MM HG: CPT | Performed by: INTERNAL MEDICINE

## 2024-05-02 PROCEDURE — 3078F DIAST BP <80 MM HG: CPT | Performed by: INTERNAL MEDICINE

## 2024-05-02 PROCEDURE — 99214 OFFICE O/P EST MOD 30 MIN: CPT | Performed by: INTERNAL MEDICINE

## 2024-05-02 NOTE — PATIENT INSTRUCTIONS
Remote device checks every 3 months  Continue medications as prescribed  Follow up with me or [facility] EP Team in 1 year.

## 2024-08-12 ENCOUNTER — OFFICE VISIT (OUTPATIENT)
Dept: CARDIOLOGY CLINIC | Age: 79
End: 2024-08-12
Payer: MEDICARE

## 2024-08-12 VITALS
HEIGHT: 73 IN | HEART RATE: 70 BPM | BODY MASS INDEX: 35.97 KG/M2 | SYSTOLIC BLOOD PRESSURE: 144 MMHG | OXYGEN SATURATION: 98 % | WEIGHT: 271.4 LBS | DIASTOLIC BLOOD PRESSURE: 70 MMHG

## 2024-08-12 DIAGNOSIS — Z95.0 PACEMAKER: ICD-10-CM

## 2024-08-12 DIAGNOSIS — E78.2 MIXED HYPERLIPIDEMIA: ICD-10-CM

## 2024-08-12 DIAGNOSIS — I45.4 IVCD (INTRAVENTRICULAR CONDUCTION DEFECT): ICD-10-CM

## 2024-08-12 DIAGNOSIS — I50.22 CHRONIC SYSTOLIC CONGESTIVE HEART FAILURE (HCC): ICD-10-CM

## 2024-08-12 DIAGNOSIS — I10 PRIMARY HYPERTENSION: ICD-10-CM

## 2024-08-12 DIAGNOSIS — I25.5 ISCHEMIC CARDIOMYOPATHY: ICD-10-CM

## 2024-08-12 DIAGNOSIS — N18.32 STAGE 3B CHRONIC KIDNEY DISEASE (HCC): ICD-10-CM

## 2024-08-12 DIAGNOSIS — Z79.899 MEDICATION MANAGEMENT: Primary | ICD-10-CM

## 2024-08-12 DIAGNOSIS — I25.810 CORONARY ARTERY DISEASE INVOLVING CORONARY BYPASS GRAFT OF NATIVE HEART WITHOUT ANGINA PECTORIS: ICD-10-CM

## 2024-08-12 DIAGNOSIS — I77.810 AORTIC ROOT DILATION (HCC): ICD-10-CM

## 2024-08-12 PROCEDURE — 3077F SYST BP >= 140 MM HG: CPT | Performed by: INTERNAL MEDICINE

## 2024-08-12 PROCEDURE — 1036F TOBACCO NON-USER: CPT | Performed by: INTERNAL MEDICINE

## 2024-08-12 PROCEDURE — 99214 OFFICE O/P EST MOD 30 MIN: CPT | Performed by: INTERNAL MEDICINE

## 2024-08-12 PROCEDURE — 1123F ACP DISCUSS/DSCN MKR DOCD: CPT | Performed by: INTERNAL MEDICINE

## 2024-08-12 PROCEDURE — G8417 CALC BMI ABV UP PARAM F/U: HCPCS | Performed by: INTERNAL MEDICINE

## 2024-08-12 PROCEDURE — G8427 DOCREV CUR MEDS BY ELIG CLIN: HCPCS | Performed by: INTERNAL MEDICINE

## 2024-08-12 PROCEDURE — 3078F DIAST BP <80 MM HG: CPT | Performed by: INTERNAL MEDICINE

## 2024-08-12 RX ORDER — VALSARTAN 160 MG/1
160 TABLET ORAL DAILY
Qty: 90 TABLET | Refills: 3 | Status: SHIPPED | OUTPATIENT
Start: 2024-08-12

## 2024-08-12 NOTE — PATIENT INSTRUCTIONS
Plan:  Medications reviewed. Medications refilled as warranted.   Limited echocardiogram to assess heart size and function; LV function.   Stop taking Entresto 24-26 mg   Start taking Diovan 160 mg daily   Blood work: CMP, CBC, Lipid        Fast for 8 hours prior to obtaining blood work. You can have water.     Your provider has ordered testing for further evaluation.  An order/prescription has been included in your paper work.   To schedule outpatient testing, contact Central Scheduling by calling [employer] ([phone]).

## 2024-08-12 NOTE — PROGRESS NOTES
due to cost  I will stop entresto and start Valsartan 160mg daily.  Unable to afford Jardiance . Unable to take aldactone due to hyperkalemia side effects.  He is on jdptu20ls daily he is euvolemic.  No angina   continue asa statin  and beta blockers  CKD 3b follows with nephrologist  High cholesterol    LDL 49 on  2.21.23   on atorvastatin 40mg daily     Plan:  Medications reviewed. Medications refilled as warranted.   Limited echocardiogram to assess heart size and function; LV function.   Stop taking Entresto 24-26 mg   Start taking Diovan 160 mg daily   Blood work: CMP, CBC, Lipid        Fast for 8 hours prior to obtaining blood work. You can have water.     Your provider has ordered testing for further evaluation.  An order/prescription has been included in your paper work.   To schedule outpatient testing, contact Central Scheduling by calling [facility] ([phone]).    Follow up with me in 6 months       Scribe's attestation: This note was scribed in the presence of Dr. Raymond Ceron M.D. By Nikki Jimenes RN     I, Dr. Raymond Ceron, personally performed the services described in this documentation, as scribed by the above signed scribe in my presence. It is both accurate and complete to my knowledge. I agree with the details independently gathered by the clinical support staff, while the remaining scribed note accurately describes my personal service to the patient.        QUALITY MEASURES  1. Tobacco Cessation Counseling: NA  2. Retake of BP if >140/90:   NA  3. Documentation to PCP/referring for new patient:  Sent to PCP at close of office visit  4. CAD patient on anti-platelet: Yes  5. CAD patient on STATIN therapy:  Yes  6. Patient with CHF and aFib on anticoagulation:  TESHA CERON MD, MD 8/12/2024 3:15 PM

## 2024-08-26 RX ORDER — ASPIRIN 81 MG/1
81 TABLET, COATED ORAL DAILY
Qty: 90 TABLET | Refills: 3 | Status: SHIPPED | OUTPATIENT
Start: 2024-08-26

## 2024-08-26 RX ORDER — CARVEDILOL 6.25 MG/1
6.25 TABLET ORAL 2 TIMES DAILY WITH MEALS
Qty: 180 TABLET | Refills: 3 | Status: SHIPPED | OUTPATIENT
Start: 2024-08-26

## 2024-11-01 ENCOUNTER — HOSPITAL ENCOUNTER (OUTPATIENT)
Age: 79
Discharge: HOME | End: 2024-11-03
Attending: INTERNAL MEDICINE
Payer: MEDICARE

## 2024-11-01 ENCOUNTER — HOSPITAL ENCOUNTER (OUTPATIENT)
Age: 79
Discharge: HOME | End: 2024-11-01
Payer: MEDICARE

## 2024-11-01 VITALS
SYSTOLIC BLOOD PRESSURE: 144 MMHG | BODY MASS INDEX: 35.92 KG/M2 | WEIGHT: 271 LBS | DIASTOLIC BLOOD PRESSURE: 70 MMHG | HEIGHT: 73 IN

## 2024-11-01 DIAGNOSIS — I50.22 CHRONIC SYSTOLIC CONGESTIVE HEART FAILURE (HCC): ICD-10-CM

## 2024-11-01 DIAGNOSIS — Z79.899 MEDICATION MANAGEMENT: ICD-10-CM

## 2024-11-01 DIAGNOSIS — I77.810 AORTIC ROOT DILATION (HCC): ICD-10-CM

## 2024-11-01 LAB
ALBUMIN SERPL-MCNC: 3.9 G/DL (ref 3.4–5)
ALBUMIN/GLOB SERPL: 1.3 {RATIO} (ref 1.1–2.2)
ALP SERPL-CCNC: 120 U/L (ref 40–129)
ALT SERPL-CCNC: 20 U/L (ref 10–40)
ANION GAP SERPL CALCULATED.3IONS-SCNC: 10 MMOL/L (ref 3–16)
AST SERPL-CCNC: 17 U/L (ref 15–37)
BASOPHILS # BLD: 0.1 K/UL (ref 0–0.2)
BASOPHILS NFR BLD: 1 %
BILIRUB SERPL-MCNC: 0.5 MG/DL (ref 0–1)
BUN SERPL-MCNC: 54 MG/DL (ref 7–20)
CALCIUM SERPL-MCNC: 9.2 MG/DL (ref 8.3–10.6)
CHLORIDE SERPL-SCNC: 104 MMOL/L (ref 99–110)
CO2 SERPL-SCNC: 26 MMOL/L (ref 21–32)
CREAT SERPL-MCNC: 1.5 MG/DL (ref 0.8–1.3)
DEPRECATED RDW RBC AUTO: 13.4 % (ref 12.4–15.4)
ECHO AO ASC DIAM: 4 CM
ECHO AO ASCENDING AORTA INDEX: 1.63 CM/M2
ECHO AO ROOT DIAM: 4.1 CM
ECHO AO ROOT INDEX: 1.67 CM/M2
ECHO BSA: 2.51 M2
ECHO LV EF PHYSICIAN: 33 %
ECHO LV FRACTIONAL SHORTENING: 11 % (ref 28–44)
ECHO LV INTERNAL DIMENSION DIASTOLE INDEX: 2.86 CM/M2
ECHO LV INTERNAL DIMENSION DIASTOLIC: 7 CM (ref 4.2–5.9)
ECHO LV INTERNAL DIMENSION SYSTOLIC INDEX: 2.53 CM/M2
ECHO LV INTERNAL DIMENSION SYSTOLIC: 6.2 CM
ECHO LV IVSD: 1.6 CM (ref 0.6–1)
ECHO LV MASS 2D: 522.5 G (ref 88–224)
ECHO LV MASS INDEX 2D: 213.3 G/M2 (ref 49–115)
ECHO LV POSTERIOR WALL DIASTOLIC: 1.3 CM (ref 0.6–1)
ECHO LV RELATIVE WALL THICKNESS RATIO: 0.37
EOSINOPHIL # BLD: 0.1 K/UL (ref 0–0.6)
EOSINOPHIL NFR BLD: 2 %
GFR SERPLBLD CREATININE-BSD FMLA CKD-EPI: 47 ML/MIN/{1.73_M2}
GLUCOSE SERPL-MCNC: 206 MG/DL (ref 70–99)
HCT VFR BLD AUTO: 42.9 % (ref 40.5–52.5)
HGB BLD-MCNC: 14 G/DL (ref 13.5–17.5)
LYMPHOCYTES # BLD: 1.2 K/UL (ref 1–5.1)
LYMPHOCYTES NFR BLD: 15.8 %
MCH RBC QN AUTO: 29.8 PG (ref 26–34)
MCHC RBC AUTO-ENTMCNC: 32.7 G/DL (ref 31–36)
MCV RBC AUTO: 91.2 FL (ref 80–100)
MONOCYTES # BLD: 0.7 K/UL (ref 0–1.3)
MONOCYTES NFR BLD: 9.6 %
NEUTROPHILS # BLD: 5.2 K/UL (ref 1.7–7.7)
NEUTROPHILS NFR BLD: 71.6 %
PLATELET # BLD AUTO: 130 K/UL (ref 135–450)
PMV BLD AUTO: 10.3 FL (ref 5–10.5)
POTASSIUM SERPL-SCNC: 4.9 MMOL/L (ref 3.5–5.1)
PROT SERPL-MCNC: 6.9 G/DL (ref 6.4–8.2)
RBC # BLD AUTO: 4.7 M/UL (ref 4.2–5.9)
SODIUM SERPL-SCNC: 140 MMOL/L (ref 136–145)
WBC # BLD AUTO: 7.3 K/UL (ref 4–11)

## 2024-11-01 PROCEDURE — 36415 COLL VENOUS BLD VENIPUNCTURE: CPT

## 2024-11-01 PROCEDURE — 85025 COMPLETE CBC W/AUTO DIFF WBC: CPT

## 2024-11-01 PROCEDURE — 93308 TTE F-UP OR LMTD: CPT

## 2024-11-01 PROCEDURE — 80061 LIPID PANEL: CPT

## 2024-11-01 PROCEDURE — 80053 COMPREHEN METABOLIC PANEL: CPT

## 2024-11-02 LAB
CHOLEST SERPL-MCNC: 107 MG/DL (ref 0–199)
HDLC SERPL-MCNC: 28 MG/DL (ref 40–60)
LDL CHOLESTEROL: 65 MG/DL
TRIGL SERPL-MCNC: 72 MG/DL (ref 0–150)
VLDLC SERPL CALC-MCNC: 14 MG/DL

## 2024-11-04 ENCOUNTER — TELEPHONE (OUTPATIENT)
Dept: CARDIOLOGY CLINIC | Age: 79
End: 2024-11-04

## 2024-11-04 NOTE — TELEPHONE ENCOUNTER
Pt returned call for results of echo. Message was provided and pt v/u.     Heart function is slightly improved compared to previous testing continue current medications.

## 2025-02-19 ENCOUNTER — NURSE ONLY (OUTPATIENT)
Dept: FAMILY MEDICINE CLINIC | Age: 80
End: 2025-02-19
Payer: MEDICARE

## 2025-02-19 ENCOUNTER — OFFICE VISIT (OUTPATIENT)
Dept: CARDIOLOGY CLINIC | Age: 80
End: 2025-02-19
Payer: MEDICARE

## 2025-02-19 VITALS
SYSTOLIC BLOOD PRESSURE: 142 MMHG | DIASTOLIC BLOOD PRESSURE: 70 MMHG | WEIGHT: 278 LBS | HEART RATE: 66 BPM | OXYGEN SATURATION: 98 % | BODY MASS INDEX: 36.84 KG/M2 | HEIGHT: 73 IN

## 2025-02-19 DIAGNOSIS — I77.810 AORTIC ROOT DILATION: ICD-10-CM

## 2025-02-19 DIAGNOSIS — Z79.899 MEDICATION MANAGEMENT: ICD-10-CM

## 2025-02-19 DIAGNOSIS — R06.02 SOB (SHORTNESS OF BREATH): ICD-10-CM

## 2025-02-19 DIAGNOSIS — Z95.810 ICD (IMPLANTABLE CARDIOVERTER-DEFIBRILLATOR) IN PLACE: ICD-10-CM

## 2025-02-19 DIAGNOSIS — Z96.82 S/P PLACEMENT OF NERVE STIMULATOR: ICD-10-CM

## 2025-02-19 DIAGNOSIS — I10 PRIMARY HYPERTENSION: ICD-10-CM

## 2025-02-19 DIAGNOSIS — I25.810 CORONARY ARTERY DISEASE INVOLVING CORONARY BYPASS GRAFT OF NATIVE HEART WITHOUT ANGINA PECTORIS: Primary | ICD-10-CM

## 2025-02-19 DIAGNOSIS — I25.5 ISCHEMIC CARDIOMYOPATHY: ICD-10-CM

## 2025-02-19 DIAGNOSIS — I50.22 CHRONIC SYSTOLIC CONGESTIVE HEART FAILURE (HCC): ICD-10-CM

## 2025-02-19 DIAGNOSIS — G89.29 CHRONIC LEFT HIP PAIN: ICD-10-CM

## 2025-02-19 DIAGNOSIS — M25.552 CHRONIC LEFT HIP PAIN: ICD-10-CM

## 2025-02-19 DIAGNOSIS — E78.2 MIXED HYPERLIPIDEMIA: ICD-10-CM

## 2025-02-19 LAB
ANION GAP SERPL CALCULATED.3IONS-SCNC: 8 MMOL/L (ref 3–16)
BUN SERPL-MCNC: 42 MG/DL (ref 7–20)
CALCIUM SERPL-MCNC: 9.4 MG/DL (ref 8.3–10.6)
CHLORIDE SERPL-SCNC: 103 MMOL/L (ref 99–110)
CO2 SERPL-SCNC: 27 MMOL/L (ref 21–32)
CREAT SERPL-MCNC: 1.4 MG/DL (ref 0.8–1.3)
GFR SERPLBLD CREATININE-BSD FMLA CKD-EPI: 51 ML/MIN/{1.73_M2}
GLUCOSE SERPL-MCNC: 238 MG/DL (ref 70–99)
POTASSIUM SERPL-SCNC: 5.1 MMOL/L (ref 3.5–5.1)
SODIUM SERPL-SCNC: 138 MMOL/L (ref 136–145)
TSH SERPL DL<=0.005 MIU/L-ACNC: 3.49 UIU/ML (ref 0.27–4.2)

## 2025-02-19 PROCEDURE — G8417 CALC BMI ABV UP PARAM F/U: HCPCS | Performed by: INTERNAL MEDICINE

## 2025-02-19 PROCEDURE — G8427 DOCREV CUR MEDS BY ELIG CLIN: HCPCS | Performed by: INTERNAL MEDICINE

## 2025-02-19 PROCEDURE — 1123F ACP DISCUSS/DSCN MKR DOCD: CPT | Performed by: INTERNAL MEDICINE

## 2025-02-19 PROCEDURE — 1159F MED LIST DOCD IN RCRD: CPT | Performed by: INTERNAL MEDICINE

## 2025-02-19 PROCEDURE — 3077F SYST BP >= 140 MM HG: CPT | Performed by: INTERNAL MEDICINE

## 2025-02-19 PROCEDURE — 3078F DIAST BP <80 MM HG: CPT | Performed by: INTERNAL MEDICINE

## 2025-02-19 PROCEDURE — 1160F RVW MEDS BY RX/DR IN RCRD: CPT | Performed by: INTERNAL MEDICINE

## 2025-02-19 PROCEDURE — 1036F TOBACCO NON-USER: CPT | Performed by: INTERNAL MEDICINE

## 2025-02-19 PROCEDURE — 36415 COLL VENOUS BLD VENIPUNCTURE: CPT | Performed by: INTERNAL MEDICINE

## 2025-02-19 PROCEDURE — 99214 OFFICE O/P EST MOD 30 MIN: CPT | Performed by: INTERNAL MEDICINE

## 2025-02-19 RX ORDER — ISOSORBIDE MONONITRATE 30 MG/1
30 TABLET, EXTENDED RELEASE ORAL DAILY
Qty: 90 TABLET | Refills: 3 | Status: SHIPPED | OUTPATIENT
Start: 2025-02-19

## 2025-02-19 RX ORDER — VALSARTAN 160 MG/1
160 TABLET ORAL DAILY
Qty: 90 TABLET | Refills: 3 | Status: SHIPPED | OUTPATIENT
Start: 2025-02-19

## 2025-02-19 RX ORDER — HYDROCHLOROTHIAZIDE 25 MG/1
25 TABLET ORAL EVERY MORNING
Qty: 180 TABLET | Refills: 3 | Status: SHIPPED | OUTPATIENT
Start: 2025-02-19

## 2025-02-19 NOTE — PATIENT INSTRUCTIONS
Plan:  Labs reviewed in epic and discussed with patient.  Current medications reviewed.  Refills given as warranted.  I will send a message to the electrical doctors at [place] and their office will reach out to get you set up with an appointment.    Recommend doing blood work to checking your thyroid and kidneys  Repeat /70  Recommend starting hydralazine 25 mg two times daily  Recommend keeping your top blood pressure number around 120 with your kidney disease to help protect your kidneys.      Follow up with me in 6 months.

## 2025-02-19 NOTE — PROGRESS NOTES
Blood drawn per physician order. 21 gauge needle used. Location ac space w/o incident.  Pressure applied until bleeding stopped.  Bandaid applied.  Patient instructed to call or return if problems with bleeding.   1 tubes drawn.

## 2025-02-19 NOTE — PROGRESS NOTES
angina pectoris    Chronic systolic congestive heart failure (HCC)    Primary hypertension    Ischemic cardiomyopathy    Aortic root dilation    ICD (implantable cardioverter-defibrillator) in place    Mixed hyperlipidemia    SOB (shortness of breath)    Medication management  -     Cancel: Basic Metabolic Panel  -     Cancel: TSH reflex to FT4,FT3  -     Basic Metabolic Panel; Future  -     TSH reflex to FT4,FT3; Future    Chronic left hip pain    Other orders  -     isosorbide mononitrate (IMDUR) 30 MG extended release tablet; Take 1 tablet by mouth daily  -     valsartan (DIOVAN) 160 MG tablet; Take 1 tablet by mouth daily  -     hydroCHLOROthiazide (HYDRODIURIL) 25 MG tablet; Take 1 tablet by mouth every morning          Chronic systolic CHF and ischemic cardiomyopathy:  Low EF  continue Coreg and Imdur for low EF  On  Valsartan 160mg daily unable to tolerate higher dose due to tendency for hyperkalemia.  Will add Hydralazine 25mg BID   -echo 11/1/24  EF 30-35%  Unable to afford Jardiance . Unable to take aldactone due to hyperkalemia side effects.  He is on lasix 40mg daily he is euvolemic.    CAD: No angina continue asa statin  and beta blockers  CKD 3a follows with nephrologist  High cholesterol  LDL 65 on 11/1/24 on atorvastatin 40mg daily   Message sent to EP nurse for patient appointment for device check   Plan:  Labs reviewed in epic and discussed with patient.  Current medications reviewed.  Refills given as warranted.  I will send a message to the electrical doctors at [place] and their office will reach out to get you set up with an appointment.    Recommend doing blood work to checking your thyroid and kidneys  Repeat /70  Recommend starting hydralazine 25 mg two times daily  Recommend keeping your top blood pressure number around 120 with your kidney disease to help protect your kidneys.      Follow up with me in 6 months.     This note is scribed in the presence of Dr. Raymond Ceron MD by

## 2025-02-21 NOTE — RESULT ENCOUNTER NOTE
Raymond Ceron MD  P [facility] Cardio Practice Staff  Kidney function is better  Thyroid levels ok  Blood sugar in 200's but probably not fasting.

## 2025-06-09 NOTE — PROGRESS NOTES
CARDIAC ELECTROPHYSIOLOGY CONSULT NOTE  Date: 6/11/25  Patient Name: Eric Lima  YOB: 1945    Primary Care Physician: Qi Kasper APRN - NP    Referring Physician: Dr. Ceron    CHIEF COMPLAINT:   Chief Complaint   Patient presents with    New Patient    Device Check     Former CMV pt    Shortness of Breath        Eric Lima was seen today on 6/11/2025 in consultation due to chief complaint of follow up for dual chamber ICD.    Patient is a 79 y.o. male with PMH of prostate cancer, tobacco use. CAD, CABG, PCI YUN Cx OM vein graft, ischemic cardiomyopathy, chronic systolic CHF, HTN, HLD, CKD 3b, DM.Dual chamber pacemaker, unable to place coronary artery left ventricular lead, who was seen today to re establish care.     Patient formerly followed with Dr. Eli. In 2/2023 patient presented to the ER for chest pain and shortness of breath. He underwent staged PCI 2/2023, and echo showed LVEF of 25 to 30%, severe hypokinesis and mild concentric LVH. He was referred to electrophysiologist for consideration for ICD after being on medical therapy for 3 months with LVEF remaining at 23%.     On 1/29/24, patient had [employer] Dual Ch ICD implanted with unsuccessful attempts to place LV lead.     On 2/2025 he followed with Dr. Ceron and reported shortness of breath. He was started on Hydralzine 25 mg BID for additional BP control.     Today, 6/11/25, he presents with his wife. He states he is doing well. Patient denies current edema, chest pain, palpitations, dizziness or syncope. He does experience shortness of breath with exertion. He relates this to his back pain, he is very limited physically due to this. He previously followed with pain management, but he states he has not seen them in a while. Discussed attempting to have additional lead placed, however patient declines.      ALLERGIES:   Allergies   Allergen Reactions    Latex         MEDICATIONS:   Current Outpatient Medications

## 2025-06-11 ENCOUNTER — OFFICE VISIT (OUTPATIENT)
Dept: CARDIOLOGY CLINIC | Age: 80
End: 2025-06-11
Payer: MEDICARE

## 2025-06-11 ENCOUNTER — CLINICAL SUPPORT (OUTPATIENT)
Dept: CARDIOLOGY CLINIC | Age: 80
End: 2025-06-11

## 2025-06-11 VITALS
HEART RATE: 65 BPM | HEIGHT: 73 IN | SYSTOLIC BLOOD PRESSURE: 116 MMHG | WEIGHT: 278 LBS | BODY MASS INDEX: 36.84 KG/M2 | OXYGEN SATURATION: 100 % | DIASTOLIC BLOOD PRESSURE: 76 MMHG

## 2025-06-11 DIAGNOSIS — I25.5 ISCHEMIC CARDIOMYOPATHY: ICD-10-CM

## 2025-06-11 DIAGNOSIS — Z95.810 ICD (IMPLANTABLE CARDIOVERTER-DEFIBRILLATOR) IN PLACE: Primary | ICD-10-CM

## 2025-06-11 DIAGNOSIS — R06.02 SOB (SHORTNESS OF BREATH): ICD-10-CM

## 2025-06-11 DIAGNOSIS — R06.09 DOE (DYSPNEA ON EXERTION): ICD-10-CM

## 2025-06-11 DIAGNOSIS — I50.22 CHRONIC SYSTOLIC CONGESTIVE HEART FAILURE (HCC): ICD-10-CM

## 2025-06-11 PROCEDURE — 1036F TOBACCO NON-USER: CPT | Performed by: INTERNAL MEDICINE

## 2025-06-11 PROCEDURE — 1123F ACP DISCUSS/DSCN MKR DOCD: CPT | Performed by: INTERNAL MEDICINE

## 2025-06-11 PROCEDURE — 1159F MED LIST DOCD IN RCRD: CPT | Performed by: INTERNAL MEDICINE

## 2025-06-11 PROCEDURE — 3078F DIAST BP <80 MM HG: CPT | Performed by: INTERNAL MEDICINE

## 2025-06-11 PROCEDURE — 3074F SYST BP LT 130 MM HG: CPT | Performed by: INTERNAL MEDICINE

## 2025-06-11 PROCEDURE — G8427 DOCREV CUR MEDS BY ELIG CLIN: HCPCS | Performed by: INTERNAL MEDICINE

## 2025-06-11 PROCEDURE — 93000 ELECTROCARDIOGRAM COMPLETE: CPT | Performed by: INTERNAL MEDICINE

## 2025-06-11 PROCEDURE — G8417 CALC BMI ABV UP PARAM F/U: HCPCS | Performed by: INTERNAL MEDICINE

## 2025-06-11 PROCEDURE — 99214 OFFICE O/P EST MOD 30 MIN: CPT | Performed by: INTERNAL MEDICINE

## 2025-06-11 NOTE — PATIENT INSTRUCTIONS
PLAN:   - Discussed option to attempt lead placement that was previously attempted. We understand you decline proceeding with this.   - Referral to Carole Martinez CNP for heart failure management at [place].   - Follow up with Dr. Ceron as scheduled.   - Follow up with me in 1 year.

## 2025-08-06 LAB — DIAGNOSTIC PSA: 0.5 NG/ML (ref 0–3.9)

## 2025-08-24 ENCOUNTER — HOSPITAL ENCOUNTER (INPATIENT)
Age: 80
LOS: 2 days | Discharge: HOME | DRG: 603 | End: 2025-08-26
Attending: STUDENT IN AN ORGANIZED HEALTH CARE EDUCATION/TRAINING PROGRAM | Admitting: INTERNAL MEDICINE
Payer: MEDICARE

## 2025-08-24 ENCOUNTER — APPOINTMENT (OUTPATIENT)
Dept: GENERAL RADIOLOGY | Age: 80
DRG: 603 | End: 2025-08-24
Payer: MEDICARE

## 2025-08-24 DIAGNOSIS — L03.119 CELLULITIS OF UPPER EXTREMITY, UNSPECIFIED LATERALITY: Primary | ICD-10-CM

## 2025-08-24 PROBLEM — L03.90 CELLULITIS: Status: ACTIVE | Noted: 2025-08-24

## 2025-08-24 LAB
ALBUMIN SERPL-MCNC: 3.7 G/DL (ref 3.4–5)
ALBUMIN/GLOB SERPL: 1.4 {RATIO} (ref 1.1–2.2)
ALP SERPL-CCNC: 88 U/L (ref 40–129)
ALT SERPL-CCNC: 16 U/L (ref 10–40)
ANION GAP SERPL CALCULATED.3IONS-SCNC: 10 MMOL/L (ref 3–16)
AST SERPL-CCNC: 17 U/L (ref 15–37)
BASOPHILS # BLD: 0.1 K/UL (ref 0–0.2)
BASOPHILS NFR BLD: 0.4 %
BILIRUB SERPL-MCNC: 0.6 MG/DL (ref 0–1)
BUN SERPL-MCNC: 54 MG/DL (ref 7–20)
CALCIUM SERPL-MCNC: 8.8 MG/DL (ref 8.3–10.6)
CHLORIDE SERPL-SCNC: 102 MMOL/L (ref 99–110)
CO2 SERPL-SCNC: 22 MMOL/L (ref 21–32)
CREAT SERPL-MCNC: 1.9 MG/DL (ref 0.8–1.3)
CRP SERPL-MCNC: 35.6 MG/L (ref 0–5.1)
DEPRECATED RDW RBC AUTO: 13.9 % (ref 12.4–15.4)
EOSINOPHIL # BLD: 0.2 K/UL (ref 0–0.6)
EOSINOPHIL NFR BLD: 0.7 %
ERYTHROCYTE [SEDIMENTATION RATE] IN BLOOD BY WESTERGREN METHOD: 13 MM/HR (ref 0–20)
GFR SERPLBLD CREATININE-BSD FMLA CKD-EPI: 35 ML/MIN/{1.73_M2}
GLUCOSE BLD-MCNC: 92 MG/DL (ref 70–99)
GLUCOSE SERPL-MCNC: 97 MG/DL (ref 70–99)
HCT VFR BLD AUTO: 42 % (ref 40.5–52.5)
HGB BLD-MCNC: 14 G/DL (ref 13.5–17.5)
LACTATE BLDV-SCNC: 1 MMOL/L (ref 0.4–1.9)
LYMPHOCYTES # BLD: 1 K/UL (ref 1–5.1)
LYMPHOCYTES NFR BLD: 4.2 %
MCH RBC QN AUTO: 29.6 PG (ref 26–34)
MCHC RBC AUTO-ENTMCNC: 33.2 G/DL (ref 31–36)
MCV RBC AUTO: 89.1 FL (ref 80–100)
MONOCYTES # BLD: 1.3 K/UL (ref 0–1.3)
MONOCYTES NFR BLD: 5.8 %
NEUTROPHILS # BLD: 20.7 K/UL (ref 1.7–7.7)
NEUTROPHILS NFR BLD: 88.9 %
PERFORMED ON: NORMAL
PLATELET # BLD AUTO: 153 K/UL (ref 135–450)
PMV BLD AUTO: 10.1 FL (ref 5–10.5)
POTASSIUM SERPL-SCNC: 4.8 MMOL/L (ref 3.5–5.1)
PROT SERPL-MCNC: 6.3 G/DL (ref 6.4–8.2)
RBC # BLD AUTO: 4.72 M/UL (ref 4.2–5.9)
SODIUM SERPL-SCNC: 134 MMOL/L (ref 136–145)
WBC # BLD AUTO: 23.2 K/UL (ref 4–11)

## 2025-08-24 PROCEDURE — 87040 BLOOD CULTURE FOR BACTERIA: CPT

## 2025-08-24 PROCEDURE — 87150 DNA/RNA AMPLIFIED PROBE: CPT

## 2025-08-24 PROCEDURE — 80053 COMPREHEN METABOLIC PANEL: CPT

## 2025-08-24 PROCEDURE — 1200000000 HC SEMI PRIVATE

## 2025-08-24 PROCEDURE — 6360000002 HC RX W HCPCS: Performed by: INTERNAL MEDICINE

## 2025-08-24 PROCEDURE — 96365 THER/PROPH/DIAG IV INF INIT: CPT

## 2025-08-24 PROCEDURE — 83605 ASSAY OF LACTIC ACID: CPT

## 2025-08-24 PROCEDURE — 2500000003 HC RX 250 WO HCPCS: Performed by: INTERNAL MEDICINE

## 2025-08-24 PROCEDURE — 6360000002 HC RX W HCPCS: Performed by: NURSE PRACTITIONER

## 2025-08-24 PROCEDURE — 73130 X-RAY EXAM OF HAND: CPT

## 2025-08-24 PROCEDURE — 99285 EMERGENCY DEPT VISIT HI MDM: CPT

## 2025-08-24 PROCEDURE — 85025 COMPLETE CBC W/AUTO DIFF WBC: CPT

## 2025-08-24 PROCEDURE — 6370000000 HC RX 637 (ALT 250 FOR IP): Performed by: INTERNAL MEDICINE

## 2025-08-24 PROCEDURE — 85652 RBC SED RATE AUTOMATED: CPT

## 2025-08-24 PROCEDURE — 2580000003 HC RX 258: Performed by: STUDENT IN AN ORGANIZED HEALTH CARE EDUCATION/TRAINING PROGRAM

## 2025-08-24 PROCEDURE — 2580000003 HC RX 258: Performed by: INTERNAL MEDICINE

## 2025-08-24 PROCEDURE — 6370000000 HC RX 637 (ALT 250 FOR IP): Performed by: NURSE PRACTITIONER

## 2025-08-24 PROCEDURE — 2580000003 HC RX 258: Performed by: NURSE PRACTITIONER

## 2025-08-24 PROCEDURE — 86140 C-REACTIVE PROTEIN: CPT

## 2025-08-24 RX ORDER — ASPIRIN 81 MG/1
81 TABLET ORAL DAILY
Status: DISCONTINUED | OUTPATIENT
Start: 2025-08-24 | End: 2025-08-26 | Stop reason: HOSPADM

## 2025-08-24 RX ORDER — INSULIN LISPRO 100 [IU]/ML
0-4 INJECTION, SOLUTION INTRAVENOUS; SUBCUTANEOUS
Status: DISCONTINUED | OUTPATIENT
Start: 2025-08-24 | End: 2025-08-26 | Stop reason: HOSPADM

## 2025-08-24 RX ORDER — SODIUM CHLORIDE 0.9 % (FLUSH) 0.9 %
5-40 SYRINGE (ML) INJECTION PRN
Status: DISCONTINUED | OUTPATIENT
Start: 2025-08-24 | End: 2025-08-26 | Stop reason: HOSPADM

## 2025-08-24 RX ORDER — ISOSORBIDE MONONITRATE 30 MG/1
30 TABLET, EXTENDED RELEASE ORAL DAILY
Status: DISCONTINUED | OUTPATIENT
Start: 2025-08-24 | End: 2025-08-26 | Stop reason: HOSPADM

## 2025-08-24 RX ORDER — ONDANSETRON 4 MG/1
4 TABLET, ORALLY DISINTEGRATING ORAL EVERY 8 HOURS PRN
Status: DISCONTINUED | OUTPATIENT
Start: 2025-08-24 | End: 2025-08-26 | Stop reason: HOSPADM

## 2025-08-24 RX ORDER — CARVEDILOL 6.25 MG/1
6.25 TABLET ORAL 2 TIMES DAILY WITH MEALS
Status: DISCONTINUED | OUTPATIENT
Start: 2025-08-24 | End: 2025-08-26 | Stop reason: HOSPADM

## 2025-08-24 RX ORDER — ACETAMINOPHEN 650 MG/1
650 SUPPOSITORY RECTAL EVERY 6 HOURS PRN
Status: DISCONTINUED | OUTPATIENT
Start: 2025-08-24 | End: 2025-08-26 | Stop reason: HOSPADM

## 2025-08-24 RX ORDER — ATORVASTATIN CALCIUM 40 MG/1
40 TABLET, FILM COATED ORAL DAILY
Status: DISCONTINUED | OUTPATIENT
Start: 2025-08-24 | End: 2025-08-26 | Stop reason: HOSPADM

## 2025-08-24 RX ORDER — ENOXAPARIN SODIUM 100 MG/ML
30 INJECTION SUBCUTANEOUS 2 TIMES DAILY
Status: DISCONTINUED | OUTPATIENT
Start: 2025-08-25 | End: 2025-08-26 | Stop reason: HOSPADM

## 2025-08-24 RX ORDER — HYDROCODONE BITARTRATE AND ACETAMINOPHEN 5; 325 MG/1; MG/1
1 TABLET ORAL EVERY 6 HOURS PRN
Status: DISCONTINUED | OUTPATIENT
Start: 2025-08-24 | End: 2025-08-26 | Stop reason: HOSPADM

## 2025-08-24 RX ORDER — SODIUM CHLORIDE 9 MG/ML
INJECTION, SOLUTION INTRAVENOUS PRN
Status: DISCONTINUED | OUTPATIENT
Start: 2025-08-24 | End: 2025-08-26 | Stop reason: HOSPADM

## 2025-08-24 RX ORDER — ACETAMINOPHEN 325 MG/1
650 TABLET ORAL EVERY 6 HOURS PRN
Status: DISCONTINUED | OUTPATIENT
Start: 2025-08-24 | End: 2025-08-26 | Stop reason: HOSPADM

## 2025-08-24 RX ORDER — GLUCAGON 1 MG/ML
1 KIT INJECTION PRN
Status: DISCONTINUED | OUTPATIENT
Start: 2025-08-24 | End: 2025-08-26 | Stop reason: HOSPADM

## 2025-08-24 RX ORDER — HYDROCODONE BITARTRATE AND ACETAMINOPHEN 5; 325 MG/1; MG/1
1 TABLET ORAL ONCE
Status: COMPLETED | OUTPATIENT
Start: 2025-08-24 | End: 2025-08-24

## 2025-08-24 RX ORDER — INSULIN GLARGINE 100 [IU]/ML
30 INJECTION, SOLUTION SUBCUTANEOUS 2 TIMES DAILY
Status: DISCONTINUED | OUTPATIENT
Start: 2025-08-24 | End: 2025-08-26 | Stop reason: HOSPADM

## 2025-08-24 RX ORDER — POLYETHYLENE GLYCOL 3350 17 G/17G
17 POWDER, FOR SOLUTION ORAL DAILY PRN
Status: DISCONTINUED | OUTPATIENT
Start: 2025-08-24 | End: 2025-08-26 | Stop reason: HOSPADM

## 2025-08-24 RX ORDER — SODIUM CHLORIDE 9 MG/ML
INJECTION, SOLUTION INTRAVENOUS CONTINUOUS
Status: DISCONTINUED | OUTPATIENT
Start: 2025-08-24 | End: 2025-08-25

## 2025-08-24 RX ORDER — TAMSULOSIN HYDROCHLORIDE 0.4 MG/1
0.8 CAPSULE ORAL NIGHTLY
Status: DISCONTINUED | OUTPATIENT
Start: 2025-08-24 | End: 2025-08-26 | Stop reason: HOSPADM

## 2025-08-24 RX ORDER — ONDANSETRON 2 MG/ML
4 INJECTION INTRAMUSCULAR; INTRAVENOUS EVERY 6 HOURS PRN
Status: DISCONTINUED | OUTPATIENT
Start: 2025-08-24 | End: 2025-08-26 | Stop reason: HOSPADM

## 2025-08-24 RX ORDER — DEXTROSE MONOHYDRATE 100 MG/ML
INJECTION, SOLUTION INTRAVENOUS CONTINUOUS PRN
Status: DISCONTINUED | OUTPATIENT
Start: 2025-08-24 | End: 2025-08-26 | Stop reason: HOSPADM

## 2025-08-24 RX ORDER — SODIUM CHLORIDE, SODIUM LACTATE, POTASSIUM CHLORIDE, AND CALCIUM CHLORIDE .6; .31; .03; .02 G/100ML; G/100ML; G/100ML; G/100ML
1000 INJECTION, SOLUTION INTRAVENOUS ONCE
Status: COMPLETED | OUTPATIENT
Start: 2025-08-24 | End: 2025-08-24

## 2025-08-24 RX ORDER — GLIPIZIDE 2.5 MG/1
TABLET, EXTENDED RELEASE ORAL
COMMUNITY
Start: 2025-08-09

## 2025-08-24 RX ORDER — SODIUM CHLORIDE 0.9 % (FLUSH) 0.9 %
5-40 SYRINGE (ML) INJECTION EVERY 12 HOURS SCHEDULED
Status: DISCONTINUED | OUTPATIENT
Start: 2025-08-24 | End: 2025-08-26 | Stop reason: HOSPADM

## 2025-08-24 RX ADMIN — TAMSULOSIN HYDROCHLORIDE 0.8 MG: 0.4 CAPSULE ORAL at 21:04

## 2025-08-24 RX ADMIN — SODIUM CHLORIDE: 0.9 INJECTION, SOLUTION INTRAVENOUS at 21:04

## 2025-08-24 RX ADMIN — SODIUM CHLORIDE 3000 MG: 9 INJECTION, SOLUTION INTRAVENOUS at 23:51

## 2025-08-24 RX ADMIN — ASPIRIN 81 MG: 81 TABLET, COATED ORAL at 21:16

## 2025-08-24 RX ADMIN — SODIUM CHLORIDE, SODIUM LACTATE, POTASSIUM CHLORIDE, AND CALCIUM CHLORIDE 1000 ML: .6; .31; .03; .02 INJECTION, SOLUTION INTRAVENOUS at 18:41

## 2025-08-24 RX ADMIN — SODIUM CHLORIDE, PRESERVATIVE FREE 10 ML: 5 INJECTION INTRAVENOUS at 21:13

## 2025-08-24 RX ADMIN — ISOSORBIDE MONONITRATE 30 MG: 30 TABLET, EXTENDED RELEASE ORAL at 21:16

## 2025-08-24 RX ADMIN — HYDROCODONE BITARTRATE AND ACETAMINOPHEN 1 TABLET: 5; 325 TABLET ORAL at 23:52

## 2025-08-24 RX ADMIN — HYDROCODONE BITARTRATE AND ACETAMINOPHEN 1 TABLET: 5; 325 TABLET ORAL at 18:41

## 2025-08-24 RX ADMIN — INSULIN GLARGINE 30 UNITS: 100 INJECTION, SOLUTION SUBCUTANEOUS at 21:05

## 2025-08-24 RX ADMIN — ATORVASTATIN CALCIUM 40 MG: 40 TABLET, FILM COATED ORAL at 21:16

## 2025-08-24 RX ADMIN — SODIUM CHLORIDE 3000 MG: 9 INJECTION, SOLUTION INTRAVENOUS at 17:13

## 2025-08-24 RX ADMIN — CARVEDILOL 6.25 MG: 6.25 TABLET, FILM COATED ORAL at 21:16

## 2025-08-24 ASSESSMENT — PAIN DESCRIPTION - FREQUENCY: FREQUENCY: CONTINUOUS

## 2025-08-24 ASSESSMENT — PAIN SCALES - GENERAL
PAINLEVEL_OUTOF10: 8
PAINLEVEL_OUTOF10: 0
PAINLEVEL_OUTOF10: 6
PAINLEVEL_OUTOF10: 4

## 2025-08-24 ASSESSMENT — PAIN DESCRIPTION - PAIN TYPE
TYPE: ACUTE PAIN
TYPE: ACUTE PAIN

## 2025-08-24 ASSESSMENT — PAIN - FUNCTIONAL ASSESSMENT
PAIN_FUNCTIONAL_ASSESSMENT: 0-10
PAIN_FUNCTIONAL_ASSESSMENT: ACTIVITIES ARE NOT PREVENTED
PAIN_FUNCTIONAL_ASSESSMENT: 0-10
PAIN_FUNCTIONAL_ASSESSMENT: 0-10

## 2025-08-24 ASSESSMENT — PAIN DESCRIPTION - ONSET: ONSET: ON-GOING

## 2025-08-24 ASSESSMENT — PAIN DESCRIPTION - LOCATION: LOCATION: HAND

## 2025-08-24 ASSESSMENT — PAIN DESCRIPTION - DESCRIPTORS
DESCRIPTORS: PRESSURE
DESCRIPTORS: SHARP;STABBING

## 2025-08-24 ASSESSMENT — PAIN DESCRIPTION - ORIENTATION: ORIENTATION: RIGHT;LOWER

## 2025-08-25 PROBLEM — E11.65 TYPE 2 DIABETES MELLITUS WITH HYPERGLYCEMIA (HCC): Status: ACTIVE | Noted: 2025-08-25

## 2025-08-25 PROBLEM — N18.9 CHRONIC KIDNEY DISEASE: Status: ACTIVE | Noted: 2025-08-25

## 2025-08-25 PROBLEM — I42.9 CARDIOMYOPATHY (HCC): Status: ACTIVE | Noted: 2025-08-25

## 2025-08-25 LAB
ANION GAP SERPL CALCULATED.3IONS-SCNC: 10 MMOL/L (ref 3–16)
BASOPHILS # BLD: 0 K/UL (ref 0–0.2)
BASOPHILS NFR BLD: 0.2 %
BUN SERPL-MCNC: 59 MG/DL (ref 7–20)
CALCIUM SERPL-MCNC: 7.6 MG/DL (ref 8.3–10.6)
CHLORIDE SERPL-SCNC: 106 MMOL/L (ref 99–110)
CO2 SERPL-SCNC: 20 MMOL/L (ref 21–32)
CREAT SERPL-MCNC: 1.9 MG/DL (ref 0.8–1.3)
DEPRECATED RDW RBC AUTO: 13.8 % (ref 12.4–15.4)
EOSINOPHIL # BLD: 0 K/UL (ref 0–0.6)
EOSINOPHIL NFR BLD: 0.3 %
EST. AVERAGE GLUCOSE BLD GHB EST-MCNC: 211.6 MG/DL
GFR SERPLBLD CREATININE-BSD FMLA CKD-EPI: 35 ML/MIN/{1.73_M2}
GLUCOSE BLD-MCNC: 177 MG/DL (ref 70–99)
GLUCOSE BLD-MCNC: 274 MG/DL (ref 70–99)
GLUCOSE BLD-MCNC: 367 MG/DL (ref 70–99)
GLUCOSE BLD-MCNC: 401 MG/DL (ref 70–99)
GLUCOSE SERPL-MCNC: 171 MG/DL (ref 70–99)
HBA1C MFR BLD: 9 %
HCT VFR BLD AUTO: 32.9 % (ref 40.5–52.5)
HGB BLD-MCNC: 11 G/DL (ref 13.5–17.5)
LYMPHOCYTES # BLD: 0.8 K/UL (ref 1–5.1)
LYMPHOCYTES NFR BLD: 4.1 %
MCH RBC QN AUTO: 29.6 PG (ref 26–34)
MCHC RBC AUTO-ENTMCNC: 33.3 G/DL (ref 31–36)
MCV RBC AUTO: 88.8 FL (ref 80–100)
MONOCYTES # BLD: 1.2 K/UL (ref 0–1.3)
MONOCYTES NFR BLD: 6.8 %
NEUTROPHILS # BLD: 16.1 K/UL (ref 1.7–7.7)
NEUTROPHILS NFR BLD: 88.6 %
PERFORMED ON: ABNORMAL
PLATELET # BLD AUTO: 117 K/UL (ref 135–450)
PMV BLD AUTO: 9.9 FL (ref 5–10.5)
POTASSIUM SERPL-SCNC: 4.4 MMOL/L (ref 3.5–5.1)
RBC # BLD AUTO: 3.7 M/UL (ref 4.2–5.9)
REASON FOR REJECTION: NORMAL
REJECTED TEST: NORMAL
SODIUM SERPL-SCNC: 136 MMOL/L (ref 136–145)
WBC # BLD AUTO: 18.1 K/UL (ref 4–11)

## 2025-08-25 PROCEDURE — 6370000000 HC RX 637 (ALT 250 FOR IP): Performed by: INTERNAL MEDICINE

## 2025-08-25 PROCEDURE — 85025 COMPLETE CBC W/AUTO DIFF WBC: CPT

## 2025-08-25 PROCEDURE — 83036 HEMOGLOBIN GLYCOSYLATED A1C: CPT

## 2025-08-25 PROCEDURE — 6360000002 HC RX W HCPCS: Performed by: INTERNAL MEDICINE

## 2025-08-25 PROCEDURE — 36415 COLL VENOUS BLD VENIPUNCTURE: CPT

## 2025-08-25 PROCEDURE — 1200000000 HC SEMI PRIVATE

## 2025-08-25 PROCEDURE — 6370000000 HC RX 637 (ALT 250 FOR IP): Performed by: STUDENT IN AN ORGANIZED HEALTH CARE EDUCATION/TRAINING PROGRAM

## 2025-08-25 PROCEDURE — 99232 SBSQ HOSP IP/OBS MODERATE 35: CPT | Performed by: INTERNAL MEDICINE

## 2025-08-25 PROCEDURE — 80048 BASIC METABOLIC PNL TOTAL CA: CPT

## 2025-08-25 PROCEDURE — 2580000003 HC RX 258: Performed by: INTERNAL MEDICINE

## 2025-08-25 PROCEDURE — 2500000003 HC RX 250 WO HCPCS: Performed by: INTERNAL MEDICINE

## 2025-08-25 RX ORDER — INSULIN LISPRO 100 [IU]/ML
10 INJECTION, SOLUTION INTRAVENOUS; SUBCUTANEOUS ONCE
Status: COMPLETED | OUTPATIENT
Start: 2025-08-25 | End: 2025-08-25

## 2025-08-25 RX ADMIN — TAMSULOSIN HYDROCHLORIDE 0.8 MG: 0.4 CAPSULE ORAL at 21:09

## 2025-08-25 RX ADMIN — INSULIN GLARGINE 30 UNITS: 100 INJECTION, SOLUTION SUBCUTANEOUS at 08:49

## 2025-08-25 RX ADMIN — ISOSORBIDE MONONITRATE 30 MG: 30 TABLET, EXTENDED RELEASE ORAL at 08:50

## 2025-08-25 RX ADMIN — ENOXAPARIN SODIUM 30 MG: 100 INJECTION SUBCUTANEOUS at 21:18

## 2025-08-25 RX ADMIN — ASPIRIN 81 MG: 81 TABLET, COATED ORAL at 08:50

## 2025-08-25 RX ADMIN — INSULIN LISPRO 4 UNITS: 100 INJECTION, SOLUTION INTRAVENOUS; SUBCUTANEOUS at 11:31

## 2025-08-25 RX ADMIN — SODIUM CHLORIDE 3000 MG: 9 INJECTION, SOLUTION INTRAVENOUS at 17:54

## 2025-08-25 RX ADMIN — ATORVASTATIN CALCIUM 40 MG: 40 TABLET, FILM COATED ORAL at 08:50

## 2025-08-25 RX ADMIN — INSULIN GLARGINE 30 UNITS: 100 INJECTION, SOLUTION SUBCUTANEOUS at 21:11

## 2025-08-25 RX ADMIN — ENOXAPARIN SODIUM 30 MG: 100 INJECTION SUBCUTANEOUS at 08:50

## 2025-08-25 RX ADMIN — INSULIN LISPRO 4 UNITS: 100 INJECTION, SOLUTION INTRAVENOUS; SUBCUTANEOUS at 21:11

## 2025-08-25 RX ADMIN — HYDROCODONE BITARTRATE AND ACETAMINOPHEN 1 TABLET: 5; 325 TABLET ORAL at 13:38

## 2025-08-25 RX ADMIN — SODIUM CHLORIDE, PRESERVATIVE FREE 10 ML: 5 INJECTION INTRAVENOUS at 21:12

## 2025-08-25 RX ADMIN — HYDROCODONE BITARTRATE AND ACETAMINOPHEN 1 TABLET: 5; 325 TABLET ORAL at 05:01

## 2025-08-25 RX ADMIN — SODIUM CHLORIDE 3000 MG: 9 INJECTION, SOLUTION INTRAVENOUS at 05:01

## 2025-08-25 RX ADMIN — HYDROCODONE BITARTRATE AND ACETAMINOPHEN 1 TABLET: 5; 325 TABLET ORAL at 21:09

## 2025-08-25 RX ADMIN — INSULIN LISPRO 10 UNITS: 100 INJECTION, SOLUTION INTRAVENOUS; SUBCUTANEOUS at 21:11

## 2025-08-25 RX ADMIN — SODIUM CHLORIDE 3000 MG: 9 INJECTION, SOLUTION INTRAVENOUS at 11:41

## 2025-08-25 RX ADMIN — SODIUM CHLORIDE, PRESERVATIVE FREE 10 ML: 5 INJECTION INTRAVENOUS at 08:50

## 2025-08-25 RX ADMIN — INSULIN LISPRO 2 UNITS: 100 INJECTION, SOLUTION INTRAVENOUS; SUBCUTANEOUS at 16:31

## 2025-08-25 ASSESSMENT — PAIN - FUNCTIONAL ASSESSMENT
PAIN_FUNCTIONAL_ASSESSMENT: 0-10
PAIN_FUNCTIONAL_ASSESSMENT: ACTIVITIES ARE NOT PREVENTED
PAIN_FUNCTIONAL_ASSESSMENT: ACTIVITIES ARE NOT PREVENTED
PAIN_FUNCTIONAL_ASSESSMENT: 0-10
PAIN_FUNCTIONAL_ASSESSMENT: ACTIVITIES ARE NOT PREVENTED

## 2025-08-25 ASSESSMENT — PAIN DESCRIPTION - ONSET
ONSET: ON-GOING
ONSET: ON-GOING
ONSET: PROGRESSIVE

## 2025-08-25 ASSESSMENT — PAIN DESCRIPTION - DESCRIPTORS
DESCRIPTORS: ACHING
DESCRIPTORS: THROBBING;SHARP;NUMBNESS
DESCRIPTORS: SHOOTING

## 2025-08-25 ASSESSMENT — PAIN SCALES - GENERAL
PAINLEVEL_OUTOF10: 0
PAINLEVEL_OUTOF10: 7
PAINLEVEL_OUTOF10: 6
PAINLEVEL_OUTOF10: 0
PAINLEVEL_OUTOF10: 3
PAINLEVEL_OUTOF10: 5

## 2025-08-25 ASSESSMENT — PAIN DESCRIPTION - LOCATION
LOCATION: HAND
LOCATION: HAND
LOCATION: BACK;ARM

## 2025-08-25 ASSESSMENT — PAIN DESCRIPTION - FREQUENCY
FREQUENCY: CONTINUOUS
FREQUENCY: INTERMITTENT
FREQUENCY: INTERMITTENT

## 2025-08-25 ASSESSMENT — PAIN DESCRIPTION - ORIENTATION
ORIENTATION: RIGHT;MID;LOWER
ORIENTATION: RIGHT
ORIENTATION: RIGHT

## 2025-08-25 ASSESSMENT — PAIN DESCRIPTION - PAIN TYPE
TYPE: ACUTE PAIN

## 2025-08-25 ASSESSMENT — PAIN DESCRIPTION - DIRECTION: RADIATING_TOWARDS: DENIES

## 2025-08-26 VITALS
TEMPERATURE: 97.7 F | DIASTOLIC BLOOD PRESSURE: 72 MMHG | HEART RATE: 74 BPM | OXYGEN SATURATION: 95 % | HEIGHT: 72 IN | BODY MASS INDEX: 37.65 KG/M2 | RESPIRATION RATE: 16 BRPM | WEIGHT: 278 LBS | SYSTOLIC BLOOD PRESSURE: 115 MMHG

## 2025-08-26 LAB
ANION GAP SERPL CALCULATED.3IONS-SCNC: 10 MMOL/L (ref 3–16)
BASOPHILS # BLD: 0 K/UL (ref 0–0.2)
BASOPHILS NFR BLD: 0.4 %
BUN SERPL-MCNC: 54 MG/DL (ref 7–20)
CALCIUM SERPL-MCNC: 7.8 MG/DL (ref 8.3–10.6)
CHLORIDE SERPL-SCNC: 106 MMOL/L (ref 99–110)
CO2 SERPL-SCNC: 20 MMOL/L (ref 21–32)
CREAT SERPL-MCNC: 1.9 MG/DL (ref 0.8–1.3)
DEPRECATED RDW RBC AUTO: 13.9 % (ref 12.4–15.4)
EOSINOPHIL # BLD: 0.3 K/UL (ref 0–0.6)
EOSINOPHIL NFR BLD: 3.2 %
GFR SERPLBLD CREATININE-BSD FMLA CKD-EPI: 35 ML/MIN/{1.73_M2}
GLUCOSE BLD-MCNC: 125 MG/DL (ref 70–99)
GLUCOSE BLD-MCNC: 236 MG/DL (ref 70–99)
GLUCOSE BLD-MCNC: 82 MG/DL (ref 70–99)
GLUCOSE SERPL-MCNC: 80 MG/DL (ref 70–99)
HCT VFR BLD AUTO: 36.3 % (ref 40.5–52.5)
HGB BLD-MCNC: 12.2 G/DL (ref 13.5–17.5)
LYMPHOCYTES # BLD: 0.9 K/UL (ref 1–5.1)
LYMPHOCYTES NFR BLD: 8.8 %
MCH RBC QN AUTO: 29.8 PG (ref 26–34)
MCHC RBC AUTO-ENTMCNC: 33.6 G/DL (ref 31–36)
MCV RBC AUTO: 88.8 FL (ref 80–100)
MONOCYTES # BLD: 0.8 K/UL (ref 0–1.3)
MONOCYTES NFR BLD: 7.8 %
NEUTROPHILS # BLD: 8 K/UL (ref 1.7–7.7)
NEUTROPHILS NFR BLD: 79.8 %
PERFORMED ON: ABNORMAL
PERFORMED ON: ABNORMAL
PERFORMED ON: NORMAL
PLATELET # BLD AUTO: 120 K/UL (ref 135–450)
PMV BLD AUTO: 10.3 FL (ref 5–10.5)
POTASSIUM SERPL-SCNC: 4.1 MMOL/L (ref 3.5–5.1)
RBC # BLD AUTO: 4.09 M/UL (ref 4.2–5.9)
SODIUM SERPL-SCNC: 136 MMOL/L (ref 136–145)
WBC # BLD AUTO: 10 K/UL (ref 4–11)

## 2025-08-26 PROCEDURE — 99238 HOSP IP/OBS DSCHRG MGMT 30/<: CPT | Performed by: INTERNAL MEDICINE

## 2025-08-26 PROCEDURE — 2580000003 HC RX 258: Performed by: INTERNAL MEDICINE

## 2025-08-26 PROCEDURE — 6370000000 HC RX 637 (ALT 250 FOR IP): Performed by: INTERNAL MEDICINE

## 2025-08-26 PROCEDURE — 80048 BASIC METABOLIC PNL TOTAL CA: CPT

## 2025-08-26 PROCEDURE — 2500000003 HC RX 250 WO HCPCS: Performed by: INTERNAL MEDICINE

## 2025-08-26 PROCEDURE — 85025 COMPLETE CBC W/AUTO DIFF WBC: CPT

## 2025-08-26 PROCEDURE — 6360000002 HC RX W HCPCS: Performed by: INTERNAL MEDICINE

## 2025-08-26 PROCEDURE — 36415 COLL VENOUS BLD VENIPUNCTURE: CPT

## 2025-08-26 RX ORDER — HYDROCODONE BITARTRATE AND ACETAMINOPHEN 5; 325 MG/1; MG/1
1 TABLET ORAL EVERY 6 HOURS PRN
Qty: 12 TABLET | Refills: 0 | Status: SHIPPED | OUTPATIENT
Start: 2025-08-26 | End: 2025-08-29

## 2025-08-26 RX ADMIN — INSULIN LISPRO 1 UNITS: 100 INJECTION, SOLUTION INTRAVENOUS; SUBCUTANEOUS at 12:54

## 2025-08-26 RX ADMIN — SODIUM CHLORIDE 3000 MG: 9 INJECTION, SOLUTION INTRAVENOUS at 05:35

## 2025-08-26 RX ADMIN — CARVEDILOL 6.25 MG: 6.25 TABLET, FILM COATED ORAL at 09:21

## 2025-08-26 RX ADMIN — SODIUM CHLORIDE 3000 MG: 9 INJECTION, SOLUTION INTRAVENOUS at 00:07

## 2025-08-26 RX ADMIN — ASPIRIN 81 MG: 81 TABLET, COATED ORAL at 09:21

## 2025-08-26 RX ADMIN — INSULIN GLARGINE 30 UNITS: 100 INJECTION, SOLUTION SUBCUTANEOUS at 09:22

## 2025-08-26 RX ADMIN — HYDROCODONE BITARTRATE AND ACETAMINOPHEN 1 TABLET: 5; 325 TABLET ORAL at 09:21

## 2025-08-26 RX ADMIN — SODIUM CHLORIDE, PRESERVATIVE FREE 10 ML: 5 INJECTION INTRAVENOUS at 09:22

## 2025-08-26 RX ADMIN — ISOSORBIDE MONONITRATE 30 MG: 30 TABLET, EXTENDED RELEASE ORAL at 09:21

## 2025-08-26 RX ADMIN — SODIUM CHLORIDE 3000 MG: 9 INJECTION, SOLUTION INTRAVENOUS at 11:56

## 2025-08-26 RX ADMIN — ENOXAPARIN SODIUM 30 MG: 100 INJECTION SUBCUTANEOUS at 09:22

## 2025-08-26 RX ADMIN — ATORVASTATIN CALCIUM 40 MG: 40 TABLET, FILM COATED ORAL at 09:21

## 2025-08-26 ASSESSMENT — PAIN - FUNCTIONAL ASSESSMENT
PAIN_FUNCTIONAL_ASSESSMENT: 0-10
PAIN_FUNCTIONAL_ASSESSMENT: ACTIVITIES ARE NOT PREVENTED

## 2025-08-26 ASSESSMENT — PAIN DESCRIPTION - DESCRIPTORS: DESCRIPTORS: ACHING

## 2025-08-26 ASSESSMENT — PAIN DESCRIPTION - FREQUENCY: FREQUENCY: INTERMITTENT

## 2025-08-26 ASSESSMENT — PAIN DESCRIPTION - ORIENTATION: ORIENTATION: RIGHT

## 2025-08-26 ASSESSMENT — PAIN SCALES - GENERAL
PAINLEVEL_OUTOF10: 6
PAINLEVEL_OUTOF10: 2

## 2025-08-26 ASSESSMENT — PAIN DESCRIPTION - ONSET: ONSET: ON-GOING

## 2025-08-26 ASSESSMENT — PAIN DESCRIPTION - PAIN TYPE: TYPE: ACUTE PAIN;CHRONIC PAIN

## 2025-08-26 ASSESSMENT — PAIN DESCRIPTION - LOCATION: LOCATION: BACK;HAND

## 2025-08-28 LAB — REPORT: NORMAL

## 2025-08-28 RX ORDER — CARVEDILOL 6.25 MG/1
6.25 TABLET ORAL 2 TIMES DAILY WITH MEALS
Qty: 180 TABLET | Refills: 0 | Status: SHIPPED | OUTPATIENT
Start: 2025-08-28

## 2025-08-29 LAB — BACTERIA BLD CULT ORG #2: NORMAL

## 2025-08-31 LAB
BACTERIA BLD CULT: ABNORMAL
BACTERIA BLD CULT: ABNORMAL
ORGANISM: ABNORMAL

## 2025-09-03 ENCOUNTER — OFFICE VISIT (OUTPATIENT)
Dept: CARDIOLOGY CLINIC | Age: 80
End: 2025-09-03
Payer: MEDICARE

## 2025-09-03 VITALS
HEIGHT: 72 IN | DIASTOLIC BLOOD PRESSURE: 72 MMHG | HEART RATE: 68 BPM | OXYGEN SATURATION: 98 % | SYSTOLIC BLOOD PRESSURE: 127 MMHG | WEIGHT: 281 LBS | BODY MASS INDEX: 38.06 KG/M2

## 2025-09-03 DIAGNOSIS — I77.810 AORTIC ROOT DILATION: ICD-10-CM

## 2025-09-03 DIAGNOSIS — I10 PRIMARY HYPERTENSION: ICD-10-CM

## 2025-09-03 DIAGNOSIS — N18.32 STAGE 3B CHRONIC KIDNEY DISEASE (HCC): ICD-10-CM

## 2025-09-03 DIAGNOSIS — Z79.899 MEDICATION MANAGEMENT: ICD-10-CM

## 2025-09-03 DIAGNOSIS — E78.2 MIXED HYPERLIPIDEMIA: ICD-10-CM

## 2025-09-03 DIAGNOSIS — I50.22 CHRONIC SYSTOLIC CONGESTIVE HEART FAILURE (HCC): Primary | ICD-10-CM

## 2025-09-03 DIAGNOSIS — Z95.810 ICD (IMPLANTABLE CARDIOVERTER-DEFIBRILLATOR) IN PLACE: ICD-10-CM

## 2025-09-03 DIAGNOSIS — I25.810 CORONARY ARTERY DISEASE INVOLVING CORONARY BYPASS GRAFT OF NATIVE HEART WITHOUT ANGINA PECTORIS: ICD-10-CM

## 2025-09-03 DIAGNOSIS — I25.5 ISCHEMIC CARDIOMYOPATHY: ICD-10-CM

## 2025-09-03 PROCEDURE — G8417 CALC BMI ABV UP PARAM F/U: HCPCS | Performed by: INTERNAL MEDICINE

## 2025-09-03 PROCEDURE — 3074F SYST BP LT 130 MM HG: CPT | Performed by: INTERNAL MEDICINE

## 2025-09-03 PROCEDURE — 1123F ACP DISCUSS/DSCN MKR DOCD: CPT | Performed by: INTERNAL MEDICINE

## 2025-09-03 PROCEDURE — 3078F DIAST BP <80 MM HG: CPT | Performed by: INTERNAL MEDICINE

## 2025-09-03 PROCEDURE — 1160F RVW MEDS BY RX/DR IN RCRD: CPT | Performed by: INTERNAL MEDICINE

## 2025-09-03 PROCEDURE — 1111F DSCHRG MED/CURRENT MED MERGE: CPT | Performed by: INTERNAL MEDICINE

## 2025-09-03 PROCEDURE — 1159F MED LIST DOCD IN RCRD: CPT | Performed by: INTERNAL MEDICINE

## 2025-09-03 PROCEDURE — 1036F TOBACCO NON-USER: CPT | Performed by: INTERNAL MEDICINE

## 2025-09-03 PROCEDURE — 99214 OFFICE O/P EST MOD 30 MIN: CPT | Performed by: INTERNAL MEDICINE

## 2025-09-03 PROCEDURE — G8427 DOCREV CUR MEDS BY ELIG CLIN: HCPCS | Performed by: INTERNAL MEDICINE

## 2025-09-03 RX ORDER — ASPIRIN 81 MG/1
81 TABLET ORAL DAILY
Qty: 90 TABLET | Refills: 3 | Status: SHIPPED | OUTPATIENT
Start: 2025-09-03

## 2025-09-03 RX ORDER — CARVEDILOL 6.25 MG/1
6.25 TABLET ORAL 2 TIMES DAILY WITH MEALS
Qty: 180 TABLET | Refills: 3 | Status: SHIPPED | OUTPATIENT
Start: 2025-09-03

## 2025-09-03 RX ORDER — HYDROCHLOROTHIAZIDE 25 MG/1
25 TABLET ORAL 2 TIMES DAILY
Qty: 180 TABLET | Refills: 3 | Status: SHIPPED | OUTPATIENT
Start: 2025-09-03 | End: 2025-09-04 | Stop reason: CLARIF

## 2025-09-04 ENCOUNTER — TELEPHONE (OUTPATIENT)
Dept: CARDIOLOGY CLINIC | Age: 80
End: 2025-09-04

## 2025-09-04 RX ORDER — HYDRALAZINE HYDROCHLORIDE 25 MG/1
25 TABLET, FILM COATED ORAL 2 TIMES DAILY
Qty: 180 TABLET | Refills: 3 | Status: SHIPPED | OUTPATIENT
Start: 2025-09-04

## 2025-09-05 LAB
BACTERIA BLD CULT: ABNORMAL
BACTERIA BLD CULT: ABNORMAL
Lab: NORMAL
ORGANISM: ABNORMAL
REPORT: NORMAL
THIS TEST SENT TO: NORMAL

## (undated) DEVICE — BOWL MED L 32OZ PLAS W/ MOLD GRAD EZ OPN PEEL PCH

## (undated) DEVICE — BASIC SINGLE BASIN 1-LF: Brand: MEDLINE INDUSTRIES, INC.

## (undated) DEVICE — EVACUATOR URO BLDR W/ ADPT UROVAC

## (undated) DEVICE — BAG DRAINAGE 2000ML UROLOGY ANTI REFLUX CHAMBER SAMPLE PORT

## (undated) DEVICE — GOWN SIRUS NONREIN LG W/TWL: Brand: MEDLINE INDUSTRIES, INC.

## (undated) DEVICE — SOLUTION IRRIG 3000ML 1.5% GL USP UROMATIC CONT

## (undated) DEVICE — BAG URIN STRL FOR URO CTCH SYS

## (undated) DEVICE — CYSTO: Brand: MEDLINE INDUSTRIES, INC.

## (undated) DEVICE — CATHETER URETH 22FR BLLN 30CC SIL HYDRGEL 3 W F SPEC SHT

## (undated) DEVICE — TUBING, SUCTION, 3/16" X 10', STRAIGHT: Brand: MEDLINE

## (undated) DEVICE — GLOVE ORANGE PI 7 1/2   MSG9075

## (undated) DEVICE — BAG DRNGE COMB PK

## (undated) DEVICE — DRAINBAG,ANTI-REFLUX TOWER,L/F,2000ML,LL: Brand: MEDLINE

## (undated) DEVICE — Y-TYPE TUR/BLADDER IRRIGATION SET, REGULATING CLAMP

## (undated) DEVICE — ELECTRODE PT RET AD L9FT HI MOIST COND ADH HYDRGEL CORDED

## (undated) DEVICE — BASIC CYSTO I-LF: Brand: MEDLINE INDUSTRIES, INC.

## (undated) DEVICE — SYRINGE, LUER LOCK, 10ML: Brand: MEDLINE

## (undated) DEVICE — SOLUTION IRRIG 2000ML STRL H2O UROMATIC PLAS CONT USP

## (undated) DEVICE — SOLUTION IV IRRIG WATER 1000ML POUR BRL 2F7114

## (undated) DEVICE — PREP SOL PVP IODINE 4%  4 OZ/BTL

## (undated) DEVICE — SYRINGE 60ML IRRIG PISTON TOMEY

## (undated) DEVICE — CATHETER URETH 22FR BLLN 30CC 3 W F SPEC INF CTRL BARDX

## (undated) DEVICE — SOLUTION IV IRRIG 500ML 0.9% SODIUM CHL 2F7123